# Patient Record
Sex: FEMALE | Race: BLACK OR AFRICAN AMERICAN | Employment: OTHER
[De-identification: names, ages, dates, MRNs, and addresses within clinical notes are randomized per-mention and may not be internally consistent; named-entity substitution may affect disease eponyms.]

---

## 2017-01-31 ENCOUNTER — OFFICE VISIT (OUTPATIENT)
Dept: ORTHOPEDIC SURGERY | Facility: CLINIC | Age: 47
End: 2017-01-31

## 2017-01-31 VITALS — BODY MASS INDEX: 47.09 KG/M2 | WEIGHT: 293 LBS | HEIGHT: 66 IN

## 2017-01-31 DIAGNOSIS — S46.011A TRAUMATIC ROTATOR CUFF TEAR, RIGHT, INITIAL ENCOUNTER: ICD-10-CM

## 2017-01-31 DIAGNOSIS — S43.084D: Primary | ICD-10-CM

## 2017-01-31 PROCEDURE — 99203 OFFICE O/P NEW LOW 30 MIN: CPT | Performed by: ORTHOPAEDIC SURGERY

## 2017-02-28 ENCOUNTER — OFFICE VISIT (OUTPATIENT)
Dept: ORTHOPEDIC SURGERY | Facility: CLINIC | Age: 47
End: 2017-02-28

## 2017-02-28 VITALS — BODY MASS INDEX: 47.09 KG/M2 | WEIGHT: 293 LBS | HEIGHT: 66 IN

## 2017-02-28 DIAGNOSIS — S43.084D: Primary | ICD-10-CM

## 2017-02-28 PROCEDURE — 99213 OFFICE O/P EST LOW 20 MIN: CPT | Performed by: ORTHOPAEDIC SURGERY

## 2017-03-07 ENCOUNTER — ANESTHESIA EVENT (OUTPATIENT)
Dept: OPERATING ROOM | Age: 47
End: 2017-03-07
Payer: MEDICAID

## 2017-03-08 ENCOUNTER — ANESTHESIA (OUTPATIENT)
Dept: OPERATING ROOM | Age: 47
End: 2017-03-08
Payer: MEDICAID

## 2017-03-08 ENCOUNTER — HOSPITAL ENCOUNTER (OUTPATIENT)
Age: 47
Setting detail: OUTPATIENT SURGERY
Discharge: HOME | End: 2017-03-08
Attending: ORTHOPAEDIC SURGERY | Admitting: ORTHOPAEDIC SURGERY
Payer: MEDICAID

## 2017-03-08 VITALS
OXYGEN SATURATION: 98 % | RESPIRATION RATE: 14 BRPM | BODY MASS INDEX: 48.82 KG/M2 | HEIGHT: 65 IN | TEMPERATURE: 97.3 F | WEIGHT: 293 LBS | DIASTOLIC BLOOD PRESSURE: 83 MMHG | HEART RATE: 79 BPM | SYSTOLIC BLOOD PRESSURE: 164 MMHG

## 2017-03-08 VITALS — OXYGEN SATURATION: 97 % | TEMPERATURE: 96 F | DIASTOLIC BLOOD PRESSURE: 149 MMHG | SYSTOLIC BLOOD PRESSURE: 233 MMHG

## 2017-03-08 LAB
GLUCOSE BLD-MCNC: 106 MG/DL (ref 74–106)
HCG, PREGNANCY URINE (POC): NEGATIVE
POC BUN: 32 MG/DL (ref 6–20)
POC CREATININE: 1.5 MG/DL (ref 0.6–1.4)
POC POTASSIUM: 5.1 MMOL/L (ref 3.5–5.1)

## 2017-03-08 PROCEDURE — 7100000000 HC PACU RECOVERY - FIRST 15 MIN: Performed by: ORTHOPAEDIC SURGERY

## 2017-03-08 PROCEDURE — 2580000003 HC RX 258: Performed by: ANESTHESIOLOGY

## 2017-03-08 PROCEDURE — 6360000002 HC RX W HCPCS: Performed by: NURSE ANESTHETIST, CERTIFIED REGISTERED

## 2017-03-08 PROCEDURE — 82947 ASSAY GLUCOSE BLOOD QUANT: CPT

## 2017-03-08 PROCEDURE — 84520 ASSAY OF UREA NITROGEN: CPT

## 2017-03-08 PROCEDURE — 76937 US GUIDE VASCULAR ACCESS: CPT

## 2017-03-08 PROCEDURE — 3700000001 HC ADD 15 MINUTES (ANESTHESIA): Performed by: ORTHOPAEDIC SURGERY

## 2017-03-08 PROCEDURE — 82565 ASSAY OF CREATININE: CPT

## 2017-03-08 PROCEDURE — 2720000010 HC SURG SUPPLY STERILE: Performed by: ORTHOPAEDIC SURGERY

## 2017-03-08 PROCEDURE — 6360000002 HC RX W HCPCS: Performed by: ANESTHESIOLOGY

## 2017-03-08 PROCEDURE — 3600000014 HC SURGERY LEVEL 4 ADDTL 15MIN: Performed by: ORTHOPAEDIC SURGERY

## 2017-03-08 PROCEDURE — 2500000003 HC RX 250 WO HCPCS: Performed by: NURSE ANESTHETIST, CERTIFIED REGISTERED

## 2017-03-08 PROCEDURE — 84132 ASSAY OF SERUM POTASSIUM: CPT

## 2017-03-08 PROCEDURE — 2500000003 HC RX 250 WO HCPCS: Performed by: STUDENT IN AN ORGANIZED HEALTH CARE EDUCATION/TRAINING PROGRAM

## 2017-03-08 PROCEDURE — 7100000001 HC PACU RECOVERY - ADDTL 15 MIN: Performed by: ORTHOPAEDIC SURGERY

## 2017-03-08 PROCEDURE — 6370000000 HC RX 637 (ALT 250 FOR IP): Performed by: ANESTHESIOLOGY

## 2017-03-08 PROCEDURE — 7100000010 HC PHASE II RECOVERY - FIRST 15 MIN: Performed by: ORTHOPAEDIC SURGERY

## 2017-03-08 PROCEDURE — 29823 SHO ARTHRS SRG XTNSV DBRDMT: CPT | Performed by: ORTHOPAEDIC SURGERY

## 2017-03-08 PROCEDURE — 3700000000 HC ANESTHESIA ATTENDED CARE: Performed by: ORTHOPAEDIC SURGERY

## 2017-03-08 PROCEDURE — 7100000011 HC PHASE II RECOVERY - ADDTL 15 MIN: Performed by: ORTHOPAEDIC SURGERY

## 2017-03-08 PROCEDURE — 3600000004 HC SURGERY LEVEL 4 BASE: Performed by: ORTHOPAEDIC SURGERY

## 2017-03-08 PROCEDURE — 84703 CHORIONIC GONADOTROPIN ASSAY: CPT

## 2017-03-08 RX ORDER — GLYCOPYRROLATE 0.2 MG/ML
INJECTION INTRAMUSCULAR; INTRAVENOUS PRN
Status: DISCONTINUED | OUTPATIENT
Start: 2017-03-08 | End: 2017-03-08 | Stop reason: SDUPTHER

## 2017-03-08 RX ORDER — THIAMINE MONONITRATE (VIT B1) 100 MG
100 TABLET ORAL DAILY
COMMUNITY

## 2017-03-08 RX ORDER — FENTANYL CITRATE 50 UG/ML
INJECTION, SOLUTION INTRAMUSCULAR; INTRAVENOUS PRN
Status: DISCONTINUED | OUTPATIENT
Start: 2017-03-08 | End: 2017-03-08 | Stop reason: SDUPTHER

## 2017-03-08 RX ORDER — OXYCODONE HYDROCHLORIDE AND ACETAMINOPHEN 5; 325 MG/1; MG/1
1 TABLET ORAL
Status: COMPLETED | OUTPATIENT
Start: 2017-03-08 | End: 2017-03-08

## 2017-03-08 RX ORDER — SODIUM CHLORIDE, SODIUM LACTATE, POTASSIUM CHLORIDE, CALCIUM CHLORIDE 600; 310; 30; 20 MG/100ML; MG/100ML; MG/100ML; MG/100ML
INJECTION, SOLUTION INTRAVENOUS CONTINUOUS
Status: DISCONTINUED | OUTPATIENT
Start: 2017-03-08 | End: 2017-03-08 | Stop reason: HOSPADM

## 2017-03-08 RX ORDER — ONDANSETRON 2 MG/ML
4 INJECTION INTRAMUSCULAR; INTRAVENOUS
Status: COMPLETED | OUTPATIENT
Start: 2017-03-08 | End: 2017-03-08

## 2017-03-08 RX ORDER — ONDANSETRON 2 MG/ML
INJECTION INTRAMUSCULAR; INTRAVENOUS PRN
Status: DISCONTINUED | OUTPATIENT
Start: 2017-03-08 | End: 2017-03-08 | Stop reason: SDUPTHER

## 2017-03-08 RX ORDER — OXYCODONE HYDROCHLORIDE AND ACETAMINOPHEN 5; 325 MG/1; MG/1
1 TABLET ORAL EVERY 4 HOURS PRN
Qty: 60 TABLET | Refills: 0 | Status: SHIPPED | OUTPATIENT
Start: 2017-03-08 | End: 2017-03-15

## 2017-03-08 RX ORDER — ROCURONIUM BROMIDE 10 MG/ML
INJECTION, SOLUTION INTRAVENOUS PRN
Status: DISCONTINUED | OUTPATIENT
Start: 2017-03-08 | End: 2017-03-08 | Stop reason: SDUPTHER

## 2017-03-08 RX ORDER — DIPHENHYDRAMINE HYDROCHLORIDE 50 MG/ML
12.5 INJECTION INTRAMUSCULAR; INTRAVENOUS
Status: DISCONTINUED | OUTPATIENT
Start: 2017-03-08 | End: 2017-03-08 | Stop reason: HOSPADM

## 2017-03-08 RX ORDER — PROMETHAZINE HYDROCHLORIDE 25 MG/ML
6.25 INJECTION, SOLUTION INTRAMUSCULAR; INTRAVENOUS
Status: COMPLETED | OUTPATIENT
Start: 2017-03-08 | End: 2017-03-08

## 2017-03-08 RX ORDER — FERROUS SULFATE 325(65) MG
325 TABLET ORAL
COMMUNITY

## 2017-03-08 RX ORDER — LIDOCAINE HYDROCHLORIDE 10 MG/ML
INJECTION, SOLUTION INFILTRATION; PERINEURAL PRN
Status: DISCONTINUED | OUTPATIENT
Start: 2017-03-08 | End: 2017-03-08 | Stop reason: SDUPTHER

## 2017-03-08 RX ORDER — PROMETHAZINE HYDROCHLORIDE 25 MG/ML
12.5 INJECTION, SOLUTION INTRAMUSCULAR; INTRAVENOUS
Status: DISCONTINUED | OUTPATIENT
Start: 2017-03-08 | End: 2017-03-08 | Stop reason: HOSPADM

## 2017-03-08 RX ORDER — PROPOFOL 10 MG/ML
INJECTION, EMULSION INTRAVENOUS PRN
Status: DISCONTINUED | OUTPATIENT
Start: 2017-03-08 | End: 2017-03-08 | Stop reason: SDUPTHER

## 2017-03-08 RX ADMIN — ROCURONIUM BROMIDE 20 MG: 10 INJECTION INTRAVENOUS at 10:40

## 2017-03-08 RX ADMIN — FENTANYL CITRATE 150 MCG: 50 INJECTION INTRAMUSCULAR; INTRAVENOUS at 10:19

## 2017-03-08 RX ADMIN — OXYCODONE HYDROCHLORIDE AND ACETAMINOPHEN 1 TABLET: 5; 325 TABLET ORAL at 13:03

## 2017-03-08 RX ADMIN — Medication 900 MG: at 10:35

## 2017-03-08 RX ADMIN — HYDROMORPHONE HYDROCHLORIDE 0.25 MG: 1 INJECTION, SOLUTION INTRAMUSCULAR; INTRAVENOUS; SUBCUTANEOUS at 13:36

## 2017-03-08 RX ADMIN — ROCURONIUM BROMIDE 50 MG: 10 INJECTION INTRAVENOUS at 10:19

## 2017-03-08 RX ADMIN — NEOSTIGMINE METHYLSULFATE 4 MG: 1 INJECTION, SOLUTION INTRAMUSCULAR; INTRAVENOUS; SUBCUTANEOUS at 11:31

## 2017-03-08 RX ADMIN — FENTANYL CITRATE 50 MCG: 50 INJECTION INTRAMUSCULAR; INTRAVENOUS at 10:46

## 2017-03-08 RX ADMIN — LIDOCAINE HYDROCHLORIDE 50 MG: 10 INJECTION, SOLUTION INFILTRATION; PERINEURAL at 10:19

## 2017-03-08 RX ADMIN — ONDANSETRON 4 MG: 2 INJECTION, SOLUTION INTRAMUSCULAR; INTRAVENOUS at 11:33

## 2017-03-08 RX ADMIN — FENTANYL CITRATE 50 MCG: 50 INJECTION INTRAMUSCULAR; INTRAVENOUS at 10:39

## 2017-03-08 RX ADMIN — PROMETHAZINE HYDROCHLORIDE 6.25 MG: 25 INJECTION, SOLUTION INTRAMUSCULAR; INTRAVENOUS at 13:49

## 2017-03-08 RX ADMIN — GLYCOPYRROLATE 0.6 MG: 0.2 INJECTION, SOLUTION INTRAMUSCULAR; INTRAVENOUS at 11:31

## 2017-03-08 RX ADMIN — ONDANSETRON 4 MG: 2 INJECTION, SOLUTION INTRAMUSCULAR; INTRAVENOUS at 12:10

## 2017-03-08 RX ADMIN — HYDROMORPHONE HYDROCHLORIDE 0.5 MG: 1 INJECTION, SOLUTION INTRAMUSCULAR; INTRAVENOUS; SUBCUTANEOUS at 12:39

## 2017-03-08 RX ADMIN — SODIUM CHLORIDE, POTASSIUM CHLORIDE, SODIUM LACTATE AND CALCIUM CHLORIDE: 600; 310; 30; 20 INJECTION, SOLUTION INTRAVENOUS at 10:02

## 2017-03-08 RX ADMIN — HYDROMORPHONE HYDROCHLORIDE 0.5 MG: 1 INJECTION, SOLUTION INTRAMUSCULAR; INTRAVENOUS; SUBCUTANEOUS at 12:05

## 2017-03-08 RX ADMIN — GLYCOPYRROLATE 0.2 MG: 0.2 INJECTION, SOLUTION INTRAMUSCULAR; INTRAVENOUS at 10:23

## 2017-03-08 RX ADMIN — PROPOFOL 200 MG: 10 INJECTION, EMULSION INTRAVENOUS at 10:19

## 2017-03-08 RX ADMIN — HYDROMORPHONE HYDROCHLORIDE 0.5 MG: 1 INJECTION, SOLUTION INTRAMUSCULAR; INTRAVENOUS; SUBCUTANEOUS at 12:19

## 2017-03-08 ASSESSMENT — PAIN SCALES - GENERAL
PAINLEVEL_OUTOF10: 9
PAINLEVEL_OUTOF10: 2
PAINLEVEL_OUTOF10: 9
PAINLEVEL_OUTOF10: 2
PAINLEVEL_OUTOF10: 7
PAINLEVEL_OUTOF10: 4
PAINLEVEL_OUTOF10: 8
PAINLEVEL_OUTOF10: 0
PAINLEVEL_OUTOF10: 8
PAINLEVEL_OUTOF10: 0
PAINLEVEL_OUTOF10: 5
PAINLEVEL_OUTOF10: 5
PAINLEVEL_OUTOF10: 2
PAINLEVEL_OUTOF10: 7

## 2017-03-08 ASSESSMENT — PAIN - FUNCTIONAL ASSESSMENT: PAIN_FUNCTIONAL_ASSESSMENT: 0-10

## 2017-03-15 ENCOUNTER — OFFICE VISIT (OUTPATIENT)
Dept: BARIATRICS/WEIGHT MGMT | Age: 47
End: 2017-03-15
Payer: MEDICAID

## 2017-03-15 VITALS
WEIGHT: 293 LBS | HEIGHT: 66 IN | HEART RATE: 68 BPM | RESPIRATION RATE: 18 BRPM | DIASTOLIC BLOOD PRESSURE: 74 MMHG | SYSTOLIC BLOOD PRESSURE: 128 MMHG | BODY MASS INDEX: 47.09 KG/M2

## 2017-03-15 DIAGNOSIS — J45.909 ASTHMA, CHRONIC, UNSPECIFIED ASTHMA SEVERITY, UNCOMPLICATED: Primary | ICD-10-CM

## 2017-03-15 DIAGNOSIS — Z98.84 S/P LAPAROSCOPIC SLEEVE GASTRECTOMY: ICD-10-CM

## 2017-03-15 DIAGNOSIS — I89.0 LYMPHEDEMA: ICD-10-CM

## 2017-03-15 DIAGNOSIS — D50.9 IRON DEFICIENCY ANEMIA, UNSPECIFIED IRON DEFICIENCY ANEMIA TYPE: ICD-10-CM

## 2017-03-15 DIAGNOSIS — E66.01 MORBID OBESITY WITH BMI OF 60.0-69.9, ADULT (HCC): ICD-10-CM

## 2017-03-15 PROCEDURE — 99213 OFFICE O/P EST LOW 20 MIN: CPT | Performed by: NURSE PRACTITIONER

## 2017-03-17 ENCOUNTER — HOSPITAL ENCOUNTER (OUTPATIENT)
Age: 47
Discharge: HOME | End: 2017-03-17
Payer: MEDICAID

## 2017-03-17 DIAGNOSIS — I89.0 LYMPHEDEMA: ICD-10-CM

## 2017-03-17 DIAGNOSIS — D50.9 IRON DEFICIENCY ANEMIA, UNSPECIFIED IRON DEFICIENCY ANEMIA TYPE: ICD-10-CM

## 2017-03-17 DIAGNOSIS — Z98.84 S/P LAPAROSCOPIC SLEEVE GASTRECTOMY: ICD-10-CM

## 2017-03-17 DIAGNOSIS — J45.909 ASTHMA, CHRONIC, UNSPECIFIED ASTHMA SEVERITY, UNCOMPLICATED: ICD-10-CM

## 2017-03-17 DIAGNOSIS — E66.01 MORBID OBESITY WITH BMI OF 60.0-69.9, ADULT (HCC): ICD-10-CM

## 2017-03-17 PROBLEM — R73.03 PREDIABETES: Status: ACTIVE | Noted: 2017-03-17

## 2017-03-17 LAB
ABSOLUTE EOS #: 0.24 K/UL (ref 0–0.4)
ABSOLUTE LYMPH #: 1.5 K/UL (ref 1–4.8)
ABSOLUTE MONO #: 0.36 K/UL (ref 0–1)
ALBUMIN SERPL-MCNC: 3.9 G/DL (ref 3.5–5.2)
ALBUMIN/GLOBULIN RATIO: 1 (ref 1–2.5)
ALP BLD-CCNC: 84 U/L (ref 35–104)
ALT SERPL-CCNC: 6 U/L (ref 5–33)
ANION GAP SERPL CALCULATED.3IONS-SCNC: 12 MMOL/L (ref 9–17)
AST SERPL-CCNC: 9 U/L
BASOPHILS # BLD: 0 % (ref 0–2)
BASOPHILS ABSOLUTE: 0 K/UL (ref 0–0.2)
BILIRUB SERPL-MCNC: 0.48 MG/DL (ref 0.3–1.2)
BUN BLDV-MCNC: 16 MG/DL (ref 6–20)
BUN/CREAT BLD: 12 (ref 9–20)
CALCIUM SERPL-MCNC: 9.5 MG/DL (ref 8.6–10.4)
CHLORIDE BLD-SCNC: 97 MMOL/L (ref 98–107)
CHOLESTEROL/HDL RATIO: 4.6
CHOLESTEROL: 278 MG/DL
CO2: 28 MMOL/L (ref 20–31)
CREAT SERPL-MCNC: 1.3 MG/DL (ref 0.5–0.9)
DIFFERENTIAL TYPE: ABNORMAL
EOSINOPHILS RELATIVE PERCENT: 4 % (ref 0–8)
ESTIMATED AVERAGE GLUCOSE: 126 MG/DL
FERRITIN: 108 UG/L (ref 13–150)
FOLATE: 7.9 NG/ML
GFR AFRICAN AMERICAN: 54 ML/MIN
GFR NON-AFRICAN AMERICAN: 44 ML/MIN
GFR SERPL CREATININE-BSD FRML MDRD: ABNORMAL ML/MIN/{1.73_M2}
GFR SERPL CREATININE-BSD FRML MDRD: ABNORMAL ML/MIN/{1.73_M2}
GLUCOSE BLD-MCNC: 98 MG/DL (ref 70–99)
HBA1C MFR BLD: 6 % (ref 4.8–5.9)
HCT VFR BLD CALC: 34.9 % (ref 36–46)
HDLC SERPL-MCNC: 61 MG/DL
HEMOGLOBIN: 11.1 G/DL (ref 12–16)
IRON SATURATION: 15 % (ref 20–55)
IRON: 38 UG/DL (ref 37–145)
LDL CHOLESTEROL: 198 MG/DL (ref 0–130)
LYMPHOCYTES # BLD: 25 % (ref 24–44)
MAGNESIUM: 2 MG/DL (ref 1.6–2.6)
MCH RBC QN AUTO: 26.9 PG (ref 26–34)
MCHC RBC AUTO-ENTMCNC: 31.7 G/DL (ref 31–37)
MCV RBC AUTO: 85 FL (ref 80–100)
MONOCYTES # BLD: 6 % (ref 0–12)
MORPHOLOGY: ABNORMAL
PDW BLD-RTO: 17.1 % (ref 12.1–15.2)
PLATELET # BLD: 343 K/UL (ref 140–450)
PLATELET ESTIMATE: ABNORMAL
PMV BLD AUTO: 9.3 FL (ref 6–12)
POTASSIUM SERPL-SCNC: 4.1 MMOL/L (ref 3.7–5.3)
PTH INTACT: 68.29 PG/ML (ref 15–65)
RBC # BLD: 4.1 M/UL (ref 4–5.2)
RBC # BLD: ABNORMAL 10*6/UL
SEG NEUTROPHILS: 65 % (ref 36–66)
SEGMENTED NEUTROPHILS ABSOLUTE COUNT: 3.9 K/UL (ref 1.8–7.7)
SODIUM BLD-SCNC: 137 MMOL/L (ref 135–144)
TOTAL IRON BINDING CAPACITY: 253 UG/DL (ref 250–450)
TOTAL PROTEIN: 7.8 G/DL (ref 6.4–8.3)
TRIGL SERPL-MCNC: 95 MG/DL
TSH SERPL DL<=0.05 MIU/L-ACNC: 1.81 MIU/L (ref 0.3–5)
UNSATURATED IRON BINDING CAPACITY: 215.3 UG/DL (ref 112–347)
VITAMIN B-12: 766 PG/ML (ref 211–946)
VITAMIN D 25-HYDROXY: 33.5 NG/ML (ref 30–100)
VLDLC SERPL CALC-MCNC: ABNORMAL MG/DL (ref 1–30)
WBC # BLD: 6 K/UL (ref 3.5–11)
WBC # BLD: ABNORMAL 10*3/UL

## 2017-03-17 PROCEDURE — 83550 IRON BINDING TEST: CPT

## 2017-03-17 PROCEDURE — 82607 VITAMIN B-12: CPT

## 2017-03-17 PROCEDURE — 80061 LIPID PANEL: CPT

## 2017-03-17 PROCEDURE — 84630 ASSAY OF ZINC: CPT

## 2017-03-17 PROCEDURE — 82728 ASSAY OF FERRITIN: CPT

## 2017-03-17 PROCEDURE — 83735 ASSAY OF MAGNESIUM: CPT

## 2017-03-17 PROCEDURE — 84590 ASSAY OF VITAMIN A: CPT

## 2017-03-17 PROCEDURE — 36415 COLL VENOUS BLD VENIPUNCTURE: CPT

## 2017-03-17 PROCEDURE — 83540 ASSAY OF IRON: CPT

## 2017-03-17 PROCEDURE — 83970 ASSAY OF PARATHORMONE: CPT

## 2017-03-17 PROCEDURE — 82746 ASSAY OF FOLIC ACID SERUM: CPT

## 2017-03-17 PROCEDURE — 80053 COMPREHEN METABOLIC PANEL: CPT

## 2017-03-17 PROCEDURE — 84425 ASSAY OF VITAMIN B-1: CPT

## 2017-03-17 PROCEDURE — 85025 COMPLETE CBC W/AUTO DIFF WBC: CPT

## 2017-03-17 PROCEDURE — 84443 ASSAY THYROID STIM HORMONE: CPT

## 2017-03-17 PROCEDURE — 83036 HEMOGLOBIN GLYCOSYLATED A1C: CPT

## 2017-03-17 PROCEDURE — 82306 VITAMIN D 25 HYDROXY: CPT

## 2017-03-20 LAB
RETINYL PALMITATE: <0.02 MG/L (ref 0–0.1)
VITAMIN A LEVEL: 0.44 MG/L (ref 0.3–1.2)
VITAMIN A, INTERP: NORMAL
VITAMIN B1 WHOLE BLOOD: 58 NMOL/L (ref 70–180)
ZINC: 67 UG/DL (ref 60–120)

## 2017-03-23 ENCOUNTER — OFFICE VISIT (OUTPATIENT)
Dept: ORTHOPEDIC SURGERY | Age: 47
End: 2017-03-23

## 2017-03-23 VITALS — WEIGHT: 293 LBS | BODY MASS INDEX: 47.09 KG/M2 | HEIGHT: 66 IN

## 2017-03-23 DIAGNOSIS — S42.91XD SHOULDER FRACTURE, RIGHT, WITH ROUTINE HEALING, SUBSEQUENT ENCOUNTER: Primary | ICD-10-CM

## 2017-03-23 PROCEDURE — 99024 POSTOP FOLLOW-UP VISIT: CPT | Performed by: ORTHOPAEDIC SURGERY

## 2017-03-23 RX ORDER — OXYCODONE HYDROCHLORIDE AND ACETAMINOPHEN 5; 325 MG/1; MG/1
1 TABLET ORAL EVERY 4 HOURS PRN
COMMUNITY
End: 2017-04-25 | Stop reason: ALTCHOICE

## 2017-03-28 ENCOUNTER — HOSPITAL ENCOUNTER (OUTPATIENT)
Dept: PHYSICAL THERAPY | Age: 47
Setting detail: THERAPIES SERIES
Discharge: HOME | End: 2017-03-28
Payer: MEDICAID

## 2017-03-28 PROCEDURE — 97110 THERAPEUTIC EXERCISES: CPT

## 2017-03-28 PROCEDURE — 97164 PT RE-EVAL EST PLAN CARE: CPT

## 2017-03-28 ASSESSMENT — PAIN SCALES - GENERAL: PAINLEVEL_OUTOF10: 6

## 2017-03-28 ASSESSMENT — PAIN DESCRIPTION - ORIENTATION: ORIENTATION: RIGHT

## 2017-03-28 ASSESSMENT — PAIN DESCRIPTION - LOCATION: LOCATION: SHOULDER

## 2017-03-30 ENCOUNTER — HOSPITAL ENCOUNTER (OUTPATIENT)
Dept: PHYSICAL THERAPY | Age: 47
Setting detail: THERAPIES SERIES
Discharge: HOME | End: 2017-03-30
Payer: MEDICAID

## 2017-03-30 PROCEDURE — G0283 ELEC STIM OTHER THAN WOUND: HCPCS

## 2017-03-30 PROCEDURE — 97140 MANUAL THERAPY 1/> REGIONS: CPT

## 2017-03-30 PROCEDURE — 97110 THERAPEUTIC EXERCISES: CPT

## 2017-03-30 ASSESSMENT — PAIN DESCRIPTION - LOCATION: LOCATION: SHOULDER

## 2017-03-30 ASSESSMENT — PAIN DESCRIPTION - ORIENTATION: ORIENTATION: RIGHT

## 2017-03-30 ASSESSMENT — PAIN SCALES - GENERAL: PAINLEVEL_OUTOF10: 4

## 2017-04-03 ENCOUNTER — HOSPITAL ENCOUNTER (OUTPATIENT)
Dept: PHYSICAL THERAPY | Age: 47
Setting detail: THERAPIES SERIES
Discharge: HOME | End: 2017-04-03
Payer: MEDICAID

## 2017-04-03 PROCEDURE — 97110 THERAPEUTIC EXERCISES: CPT

## 2017-04-03 PROCEDURE — G0283 ELEC STIM OTHER THAN WOUND: HCPCS

## 2017-04-05 ENCOUNTER — HOSPITAL ENCOUNTER (OUTPATIENT)
Dept: PHYSICAL THERAPY | Age: 47
Setting detail: THERAPIES SERIES
Discharge: HOME | End: 2017-04-05
Payer: MEDICAID

## 2017-04-05 PROCEDURE — 97110 THERAPEUTIC EXERCISES: CPT

## 2017-04-05 PROCEDURE — 97140 MANUAL THERAPY 1/> REGIONS: CPT

## 2017-04-05 PROCEDURE — G0283 ELEC STIM OTHER THAN WOUND: HCPCS

## 2017-04-05 ASSESSMENT — PAIN DESCRIPTION - LOCATION: LOCATION: SHOULDER

## 2017-04-05 ASSESSMENT — PAIN SCALES - GENERAL: PAINLEVEL_OUTOF10: 8

## 2017-04-05 ASSESSMENT — PAIN DESCRIPTION - ORIENTATION: ORIENTATION: RIGHT

## 2017-04-07 ENCOUNTER — HOSPITAL ENCOUNTER (OUTPATIENT)
Dept: PHYSICAL THERAPY | Age: 47
Setting detail: THERAPIES SERIES
Discharge: HOME | End: 2017-04-07
Payer: MEDICAID

## 2017-04-07 NOTE — PROGRESS NOTES
[facility]  Inpatient/Observation/Outpatient Rehabilitation    Date: 2017  Patient Name: Cate Hawthorne       [] Inpatient Acute/Observation       []  Outpatient  : 1970       [] Pt no showed for scheduled appointment    [] Pt refused/declined therapy at this time due to:           [x] Pt cancelled due to:  [] No Reason Given   [] Sick/ill   [] OtherTransportation  Will attempt evaluation at our earliest availability.        Loral Spring Date: 2017

## 2017-04-10 ENCOUNTER — HOSPITAL ENCOUNTER (OUTPATIENT)
Dept: PHYSICAL THERAPY | Age: 47
Setting detail: THERAPIES SERIES
Discharge: HOME | End: 2017-04-10
Payer: MEDICAID

## 2017-04-10 PROCEDURE — G0283 ELEC STIM OTHER THAN WOUND: HCPCS

## 2017-04-10 PROCEDURE — 97110 THERAPEUTIC EXERCISES: CPT

## 2017-04-12 ENCOUNTER — HOSPITAL ENCOUNTER (OUTPATIENT)
Dept: PHYSICAL THERAPY | Age: 47
Setting detail: THERAPIES SERIES
Discharge: HOME | End: 2017-04-12
Payer: MEDICAID

## 2017-04-12 PROCEDURE — 97140 MANUAL THERAPY 1/> REGIONS: CPT

## 2017-04-12 PROCEDURE — 97110 THERAPEUTIC EXERCISES: CPT

## 2017-04-12 PROCEDURE — G0283 ELEC STIM OTHER THAN WOUND: HCPCS

## 2017-04-14 ENCOUNTER — HOSPITAL ENCOUNTER (OUTPATIENT)
Dept: PHYSICAL THERAPY | Age: 47
Setting detail: THERAPIES SERIES
Discharge: HOME | End: 2017-04-14
Payer: MEDICAID

## 2017-04-14 PROCEDURE — 97110 THERAPEUTIC EXERCISES: CPT

## 2017-04-14 PROCEDURE — G0283 ELEC STIM OTHER THAN WOUND: HCPCS

## 2017-04-14 ASSESSMENT — PAIN SCALES - GENERAL: PAINLEVEL_OUTOF10: 5

## 2017-04-17 ENCOUNTER — HOSPITAL ENCOUNTER (OUTPATIENT)
Dept: PHYSICAL THERAPY | Age: 47
Setting detail: THERAPIES SERIES
Discharge: HOME | End: 2017-04-17
Payer: MEDICAID

## 2017-04-17 PROCEDURE — 97110 THERAPEUTIC EXERCISES: CPT

## 2017-04-17 PROCEDURE — G0283 ELEC STIM OTHER THAN WOUND: HCPCS

## 2017-04-17 ASSESSMENT — PAIN SCALES - GENERAL: PAINLEVEL_OUTOF10: 5

## 2017-04-18 ENCOUNTER — HOSPITAL ENCOUNTER (OUTPATIENT)
Dept: PHYSICAL THERAPY | Age: 47
Setting detail: THERAPIES SERIES
Discharge: HOME | End: 2017-04-18
Payer: MEDICAID

## 2017-04-18 PROCEDURE — G0283 ELEC STIM OTHER THAN WOUND: HCPCS

## 2017-04-18 PROCEDURE — 97110 THERAPEUTIC EXERCISES: CPT

## 2017-04-21 ENCOUNTER — HOSPITAL ENCOUNTER (OUTPATIENT)
Dept: PHYSICAL THERAPY | Age: 47
Setting detail: THERAPIES SERIES
Discharge: HOME | End: 2017-04-21
Payer: MEDICAID

## 2017-04-21 PROCEDURE — 97110 THERAPEUTIC EXERCISES: CPT

## 2017-04-21 PROCEDURE — G0283 ELEC STIM OTHER THAN WOUND: HCPCS

## 2017-04-21 ASSESSMENT — PAIN SCALES - GENERAL: PAINLEVEL_OUTOF10: 6

## 2017-04-24 ENCOUNTER — APPOINTMENT (OUTPATIENT)
Dept: PHYSICAL THERAPY | Age: 47
End: 2017-04-24
Payer: MEDICAID

## 2017-04-25 PROBLEM — G44.309 HEADACHES DUE TO OLD HEAD INJURY: Status: ACTIVE | Noted: 2017-04-25

## 2017-04-25 PROBLEM — S09.90XS HEADACHES DUE TO OLD HEAD INJURY: Status: ACTIVE | Noted: 2017-04-25

## 2017-04-25 PROBLEM — H91.90 HEARING LOSS: Status: ACTIVE | Noted: 2017-04-25

## 2017-04-26 ENCOUNTER — HOSPITAL ENCOUNTER (OUTPATIENT)
Dept: PHYSICAL THERAPY | Age: 47
Setting detail: THERAPIES SERIES
Discharge: HOME | End: 2017-04-26
Payer: MEDICAID

## 2017-04-26 PROCEDURE — 97110 THERAPEUTIC EXERCISES: CPT

## 2017-04-26 PROCEDURE — G0283 ELEC STIM OTHER THAN WOUND: HCPCS

## 2017-04-26 RX ORDER — PANTOPRAZOLE SODIUM 40 MG/1
TABLET, DELAYED RELEASE ORAL
Qty: 30 TABLET | Refills: 3 | Status: SHIPPED | OUTPATIENT
Start: 2017-04-26 | End: 2017-09-23 | Stop reason: SDUPTHER

## 2017-04-26 ASSESSMENT — PAIN DESCRIPTION - ORIENTATION: ORIENTATION: RIGHT

## 2017-04-26 ASSESSMENT — PAIN DESCRIPTION - LOCATION: LOCATION: SHOULDER

## 2017-04-26 ASSESSMENT — PAIN SCALES - GENERAL: PAINLEVEL_OUTOF10: 7

## 2017-04-27 ENCOUNTER — HOSPITAL ENCOUNTER (OUTPATIENT)
Dept: PHYSICAL THERAPY | Age: 47
Setting detail: THERAPIES SERIES
Discharge: HOME | End: 2017-04-27
Payer: MEDICAID

## 2017-04-27 PROCEDURE — 97110 THERAPEUTIC EXERCISES: CPT

## 2017-04-27 PROCEDURE — G0283 ELEC STIM OTHER THAN WOUND: HCPCS

## 2017-04-27 ASSESSMENT — PAIN DESCRIPTION - ORIENTATION: ORIENTATION: RIGHT

## 2017-04-27 ASSESSMENT — PAIN DESCRIPTION - LOCATION: LOCATION: SHOULDER

## 2017-04-27 ASSESSMENT — PAIN SCALES - GENERAL: PAINLEVEL_OUTOF10: 5

## 2017-04-28 ENCOUNTER — HOSPITAL ENCOUNTER (OUTPATIENT)
Dept: PHYSICAL THERAPY | Age: 47
Setting detail: THERAPIES SERIES
Discharge: HOME | End: 2017-04-28
Payer: MEDICAID

## 2017-04-28 PROCEDURE — 97110 THERAPEUTIC EXERCISES: CPT

## 2017-04-28 PROCEDURE — G0283 ELEC STIM OTHER THAN WOUND: HCPCS

## 2017-04-28 ASSESSMENT — PAIN DESCRIPTION - ORIENTATION: ORIENTATION: RIGHT

## 2017-04-28 ASSESSMENT — PAIN DESCRIPTION - LOCATION: LOCATION: SHOULDER

## 2017-04-28 ASSESSMENT — PAIN SCALES - GENERAL: PAINLEVEL_OUTOF10: 4

## 2017-05-01 ENCOUNTER — HOSPITAL ENCOUNTER (OUTPATIENT)
Dept: PHYSICAL THERAPY | Age: 47
Setting detail: THERAPIES SERIES
Discharge: HOME | End: 2017-05-01
Payer: MEDICAID

## 2017-05-01 PROCEDURE — G0283 ELEC STIM OTHER THAN WOUND: HCPCS

## 2017-05-01 PROCEDURE — 97110 THERAPEUTIC EXERCISES: CPT

## 2017-05-01 PROCEDURE — 97116 GAIT TRAINING THERAPY: CPT

## 2017-05-03 ENCOUNTER — HOSPITAL ENCOUNTER (OUTPATIENT)
Dept: PHYSICAL THERAPY | Age: 47
Setting detail: THERAPIES SERIES
Discharge: HOME | End: 2017-05-03
Payer: MEDICAID

## 2017-05-03 ENCOUNTER — HOSPITAL ENCOUNTER (EMERGENCY)
Age: 47
Discharge: HOME | End: 2017-05-03
Attending: FAMILY MEDICINE
Payer: MEDICAID

## 2017-05-03 ENCOUNTER — APPOINTMENT (OUTPATIENT)
Dept: GENERAL RADIOLOGY | Age: 47
End: 2017-05-03
Payer: MEDICAID

## 2017-05-03 VITALS
SYSTOLIC BLOOD PRESSURE: 179 MMHG | TEMPERATURE: 97.8 F | WEIGHT: 293 LBS | OXYGEN SATURATION: 96 % | BODY MASS INDEX: 45.99 KG/M2 | DIASTOLIC BLOOD PRESSURE: 89 MMHG | HEART RATE: 78 BPM | HEIGHT: 67 IN

## 2017-05-03 DIAGNOSIS — J20.9 ACUTE BRONCHITIS, UNSPECIFIED ORGANISM: Primary | ICD-10-CM

## 2017-05-03 PROCEDURE — 71020 XR CHEST STANDARD TWO VW: CPT

## 2017-05-03 PROCEDURE — 97110 THERAPEUTIC EXERCISES: CPT

## 2017-05-03 PROCEDURE — 94664 DEMO&/EVAL PT USE INHALER: CPT

## 2017-05-03 PROCEDURE — G0283 ELEC STIM OTHER THAN WOUND: HCPCS

## 2017-05-03 PROCEDURE — 6370000000 HC RX 637 (ALT 250 FOR IP)

## 2017-05-03 PROCEDURE — 6360000002 HC RX W HCPCS: Performed by: FAMILY MEDICINE

## 2017-05-03 PROCEDURE — 93005 ELECTROCARDIOGRAM TRACING: CPT

## 2017-05-03 PROCEDURE — 99285 EMERGENCY DEPT VISIT HI MDM: CPT

## 2017-05-03 PROCEDURE — 97140 MANUAL THERAPY 1/> REGIONS: CPT

## 2017-05-03 RX ORDER — NABUMETONE 500 MG/1
500 TABLET, FILM COATED ORAL 2 TIMES DAILY
COMMUNITY

## 2017-05-03 RX ORDER — BENZONATATE 100 MG/1
100 CAPSULE ORAL EVERY 4 HOURS PRN
Qty: 30 CAPSULE | Refills: 0 | Status: SHIPPED | OUTPATIENT
Start: 2017-05-03 | End: 2017-05-10

## 2017-05-03 RX ORDER — ALBUTEROL SULFATE 2.5 MG/3ML
2.5 SOLUTION RESPIRATORY (INHALATION) ONCE
Status: COMPLETED | OUTPATIENT
Start: 2017-05-03 | End: 2017-05-03

## 2017-05-03 RX ORDER — IPRATROPIUM BROMIDE AND ALBUTEROL SULFATE 2.5; .5 MG/3ML; MG/3ML
SOLUTION RESPIRATORY (INHALATION)
Status: DISCONTINUED
Start: 2017-05-03 | End: 2017-05-03 | Stop reason: HOSPADM

## 2017-05-03 RX ORDER — ALBUTEROL SULFATE 2.5 MG/3ML
2.5 SOLUTION RESPIRATORY (INHALATION) EVERY 6 HOURS PRN
Status: DISCONTINUED | OUTPATIENT
Start: 2017-05-03 | End: 2017-05-03

## 2017-05-03 RX ADMIN — ALBUTEROL SULFATE 2.5 MG: 2.5 SOLUTION RESPIRATORY (INHALATION) at 12:17

## 2017-05-03 ASSESSMENT — PAIN DESCRIPTION - PAIN TYPE: TYPE: ACUTE PAIN;CHRONIC PAIN

## 2017-05-03 ASSESSMENT — PAIN DESCRIPTION - LOCATION: LOCATION: SHOULDER

## 2017-05-03 ASSESSMENT — PAIN DESCRIPTION - DESCRIPTORS: DESCRIPTORS: ACHING

## 2017-05-04 ENCOUNTER — HOSPITAL ENCOUNTER (OUTPATIENT)
Dept: MRI IMAGING | Facility: CLINIC | Age: 47
Discharge: HOME | End: 2017-05-04
Payer: MEDICAID

## 2017-05-04 ENCOUNTER — OFFICE VISIT (OUTPATIENT)
Dept: ORTHOPEDIC SURGERY | Age: 47
End: 2017-05-04

## 2017-05-04 VITALS — HEIGHT: 66 IN | WEIGHT: 293 LBS | BODY MASS INDEX: 47.09 KG/M2

## 2017-05-04 DIAGNOSIS — E51.9 THIAMINE DEFICIENCY: ICD-10-CM

## 2017-05-04 DIAGNOSIS — G44.309 HEADACHES DUE TO OLD HEAD INJURY: ICD-10-CM

## 2017-05-04 DIAGNOSIS — S09.90XS HEADACHES DUE TO OLD HEAD INJURY: ICD-10-CM

## 2017-05-04 DIAGNOSIS — S43.084A: Primary | ICD-10-CM

## 2017-05-04 DIAGNOSIS — H91.91 HEARING LOSS, RIGHT: ICD-10-CM

## 2017-05-04 DIAGNOSIS — S43.003A SUBLUXATION OF TENDON OF LONG HEAD OF BICEPS: ICD-10-CM

## 2017-05-04 PROCEDURE — 70551 MRI BRAIN STEM W/O DYE: CPT

## 2017-05-04 PROCEDURE — 99024 POSTOP FOLLOW-UP VISIT: CPT | Performed by: ORTHOPAEDIC SURGERY

## 2017-05-04 PROCEDURE — 72141 MRI NECK SPINE W/O DYE: CPT

## 2017-05-05 ENCOUNTER — HOSPITAL ENCOUNTER (OUTPATIENT)
Dept: PHYSICAL THERAPY | Age: 47
Setting detail: THERAPIES SERIES
Discharge: HOME | End: 2017-05-05
Payer: MEDICAID

## 2017-05-05 LAB
EKG ATRIAL RATE: 77 BPM
EKG P AXIS: 12 DEGREES
EKG P-R INTERVAL: 146 MS
EKG Q-T INTERVAL: 406 MS
EKG QRS DURATION: 84 MS
EKG QTC CALCULATION (BAZETT): 459 MS
EKG R AXIS: 15 DEGREES
EKG T AXIS: 35 DEGREES
EKG VENTRICULAR RATE: 77 BPM

## 2017-05-05 PROCEDURE — G0283 ELEC STIM OTHER THAN WOUND: HCPCS

## 2017-05-05 PROCEDURE — 97140 MANUAL THERAPY 1/> REGIONS: CPT

## 2017-05-05 PROCEDURE — 97110 THERAPEUTIC EXERCISES: CPT

## 2017-05-08 ENCOUNTER — HOSPITAL ENCOUNTER (OUTPATIENT)
Dept: PHYSICAL THERAPY | Age: 47
Setting detail: THERAPIES SERIES
Discharge: HOME | End: 2017-05-08
Payer: MEDICAID

## 2017-05-08 PROCEDURE — 97110 THERAPEUTIC EXERCISES: CPT

## 2017-05-08 PROCEDURE — G0283 ELEC STIM OTHER THAN WOUND: HCPCS

## 2017-05-08 PROCEDURE — 97140 MANUAL THERAPY 1/> REGIONS: CPT

## 2017-05-08 ASSESSMENT — PAIN DESCRIPTION - LOCATION: LOCATION: SHOULDER

## 2017-05-08 ASSESSMENT — PAIN DESCRIPTION - ORIENTATION: ORIENTATION: RIGHT

## 2017-05-08 ASSESSMENT — PAIN SCALES - GENERAL: PAINLEVEL_OUTOF10: 8

## 2017-05-09 ENCOUNTER — HOSPITAL ENCOUNTER (OUTPATIENT)
Dept: MRI IMAGING | Facility: CLINIC | Age: 47
Discharge: HOME | End: 2017-05-09
Payer: MEDICAID

## 2017-05-09 DIAGNOSIS — M54.16 LUMBAR RADICULOPATHY: ICD-10-CM

## 2017-05-09 PROCEDURE — 72148 MRI LUMBAR SPINE W/O DYE: CPT

## 2017-05-10 ENCOUNTER — HOSPITAL ENCOUNTER (OUTPATIENT)
Dept: PHYSICAL THERAPY | Age: 47
Setting detail: THERAPIES SERIES
Discharge: HOME | End: 2017-05-10
Payer: MEDICAID

## 2017-05-10 PROCEDURE — 97110 THERAPEUTIC EXERCISES: CPT

## 2017-05-10 ASSESSMENT — PAIN SCALES - GENERAL: PAINLEVEL_OUTOF10: 7

## 2017-05-12 ENCOUNTER — HOSPITAL ENCOUNTER (OUTPATIENT)
Dept: PHYSICAL THERAPY | Age: 47
Setting detail: THERAPIES SERIES
Discharge: HOME | End: 2017-05-12
Payer: MEDICAID

## 2017-05-12 PROCEDURE — 97110 THERAPEUTIC EXERCISES: CPT

## 2017-05-15 ENCOUNTER — HOSPITAL ENCOUNTER (OUTPATIENT)
Dept: PHYSICAL THERAPY | Age: 47
Setting detail: THERAPIES SERIES
Discharge: HOME | End: 2017-05-15
Payer: MEDICAID

## 2017-05-15 PROCEDURE — G0283 ELEC STIM OTHER THAN WOUND: HCPCS

## 2017-05-15 PROCEDURE — 97110 THERAPEUTIC EXERCISES: CPT

## 2017-05-15 ASSESSMENT — PAIN SCALES - GENERAL: PAINLEVEL_OUTOF10: 6

## 2017-05-15 ASSESSMENT — PAIN DESCRIPTION - LOCATION: LOCATION: SHOULDER

## 2017-05-15 ASSESSMENT — PAIN DESCRIPTION - ORIENTATION: ORIENTATION: RIGHT

## 2017-05-17 ENCOUNTER — HOSPITAL ENCOUNTER (OUTPATIENT)
Dept: PHYSICAL THERAPY | Age: 47
Setting detail: THERAPIES SERIES
Discharge: HOME | End: 2017-05-17
Payer: MEDICAID

## 2017-05-17 PROCEDURE — 97110 THERAPEUTIC EXERCISES: CPT

## 2017-05-19 ENCOUNTER — HOSPITAL ENCOUNTER (OUTPATIENT)
Dept: PHYSICAL THERAPY | Age: 47
Setting detail: THERAPIES SERIES
Discharge: HOME | End: 2017-05-19
Payer: MEDICAID

## 2017-05-19 PROCEDURE — 97116 GAIT TRAINING THERAPY: CPT

## 2017-05-19 PROCEDURE — 97110 THERAPEUTIC EXERCISES: CPT

## 2017-05-22 ENCOUNTER — HOSPITAL ENCOUNTER (OUTPATIENT)
Dept: NEUROLOGY | Age: 47
Discharge: HOME | End: 2017-05-22
Payer: MEDICAID

## 2017-05-22 ENCOUNTER — HOSPITAL ENCOUNTER (OUTPATIENT)
Dept: PHYSICAL THERAPY | Age: 47
Setting detail: THERAPIES SERIES
Discharge: HOME | End: 2017-05-22
Payer: MEDICAID

## 2017-05-22 DIAGNOSIS — G44.309 HEADACHES DUE TO OLD HEAD INJURY: ICD-10-CM

## 2017-05-22 DIAGNOSIS — S09.90XS HEADACHES DUE TO OLD HEAD INJURY: ICD-10-CM

## 2017-05-22 DIAGNOSIS — H91.91 HEARING LOSS, RIGHT: ICD-10-CM

## 2017-05-22 DIAGNOSIS — M25.569 ARTHRALGIA OF LOWER LEG, UNSPECIFIED LATERALITY: ICD-10-CM

## 2017-05-22 DIAGNOSIS — E51.9 THIAMINE DEFICIENCY: ICD-10-CM

## 2017-05-22 PROCEDURE — 95819 EEG AWAKE AND ASLEEP: CPT

## 2017-05-22 PROCEDURE — 97140 MANUAL THERAPY 1/> REGIONS: CPT

## 2017-05-22 PROCEDURE — 97110 THERAPEUTIC EXERCISES: CPT

## 2017-05-24 ENCOUNTER — HOSPITAL ENCOUNTER (OUTPATIENT)
Dept: ULTRASOUND IMAGING | Age: 47
Discharge: HOME | End: 2017-05-24
Payer: MEDICAID

## 2017-05-24 ENCOUNTER — HOSPITAL ENCOUNTER (OUTPATIENT)
Dept: PHYSICAL THERAPY | Age: 47
Setting detail: THERAPIES SERIES
Discharge: HOME | End: 2017-05-24
Payer: MEDICAID

## 2017-05-24 DIAGNOSIS — E01.0 THYROMEGALY: ICD-10-CM

## 2017-05-24 DIAGNOSIS — R53.83 FATIGUE, UNSPECIFIED TYPE: ICD-10-CM

## 2017-05-24 PROCEDURE — 97116 GAIT TRAINING THERAPY: CPT

## 2017-05-24 PROCEDURE — 76536 US EXAM OF HEAD AND NECK: CPT

## 2017-05-24 PROCEDURE — 97110 THERAPEUTIC EXERCISES: CPT

## 2017-05-26 ENCOUNTER — HOSPITAL ENCOUNTER (OUTPATIENT)
Dept: PHYSICAL THERAPY | Age: 47
Setting detail: THERAPIES SERIES
Discharge: HOME | End: 2017-05-26
Payer: MEDICAID

## 2017-05-26 PROCEDURE — 97110 THERAPEUTIC EXERCISES: CPT

## 2017-05-30 ENCOUNTER — HOSPITAL ENCOUNTER (OUTPATIENT)
Dept: PHYSICAL THERAPY | Age: 47
Setting detail: THERAPIES SERIES
Discharge: HOME | End: 2017-05-30
Payer: MEDICAID

## 2017-05-30 PROCEDURE — 97110 THERAPEUTIC EXERCISES: CPT

## 2017-05-31 ENCOUNTER — HOSPITAL ENCOUNTER (OUTPATIENT)
Dept: PHYSICAL THERAPY | Age: 47
Setting detail: THERAPIES SERIES
Discharge: HOME | End: 2017-05-31
Payer: MEDICAID

## 2017-05-31 PROCEDURE — 97110 THERAPEUTIC EXERCISES: CPT

## 2017-06-02 ENCOUNTER — HOSPITAL ENCOUNTER (OUTPATIENT)
Dept: PHYSICAL THERAPY | Age: 47
Setting detail: THERAPIES SERIES
End: 2017-06-02
Payer: MEDICAID

## 2017-06-05 ENCOUNTER — APPOINTMENT (OUTPATIENT)
Dept: PHYSICAL THERAPY | Age: 47
End: 2017-06-05
Payer: MEDICAID

## 2017-06-07 ENCOUNTER — APPOINTMENT (OUTPATIENT)
Dept: PHYSICAL THERAPY | Age: 47
End: 2017-06-07
Payer: MEDICAID

## 2017-06-07 ENCOUNTER — OFFICE VISIT (OUTPATIENT)
Dept: ONCOLOGY | Age: 47
End: 2017-06-07
Payer: MEDICAID

## 2017-06-07 VITALS
WEIGHT: 293 LBS | HEART RATE: 86 BPM | RESPIRATION RATE: 22 BRPM | HEIGHT: 66 IN | SYSTOLIC BLOOD PRESSURE: 141 MMHG | DIASTOLIC BLOOD PRESSURE: 86 MMHG | TEMPERATURE: 97.2 F | BODY MASS INDEX: 47.09 KG/M2

## 2017-06-07 DIAGNOSIS — D50.9 IRON DEFICIENCY ANEMIA, UNSPECIFIED IRON DEFICIENCY ANEMIA TYPE: Primary | ICD-10-CM

## 2017-06-07 PROCEDURE — 99214 OFFICE O/P EST MOD 30 MIN: CPT | Performed by: INTERNAL MEDICINE

## 2017-06-07 RX ORDER — POTASSIUM CHLORIDE 750 MG/1
10 TABLET, FILM COATED, EXTENDED RELEASE ORAL DAILY
Refills: 1 | COMMUNITY
Start: 2017-05-31

## 2017-06-07 RX ORDER — DOCUSATE SODIUM 100 MG/1
100 CAPSULE, LIQUID FILLED ORAL 2 TIMES DAILY
COMMUNITY

## 2017-06-09 ENCOUNTER — APPOINTMENT (OUTPATIENT)
Dept: PHYSICAL THERAPY | Age: 47
End: 2017-06-09
Payer: MEDICAID

## 2017-06-13 ENCOUNTER — HOSPITAL ENCOUNTER (OUTPATIENT)
Dept: PHYSICAL THERAPY | Age: 47
Setting detail: THERAPIES SERIES
Discharge: HOME | End: 2017-06-13
Payer: MEDICAID

## 2017-06-13 PROCEDURE — 97110 THERAPEUTIC EXERCISES: CPT

## 2017-06-15 ENCOUNTER — HOSPITAL ENCOUNTER (OUTPATIENT)
Dept: PHYSICAL THERAPY | Age: 47
Setting detail: THERAPIES SERIES
Discharge: HOME | End: 2017-06-15
Payer: MEDICAID

## 2017-06-15 PROCEDURE — 97110 THERAPEUTIC EXERCISES: CPT

## 2017-06-16 ENCOUNTER — HOSPITAL ENCOUNTER (OUTPATIENT)
Dept: PHYSICAL THERAPY | Age: 47
Setting detail: THERAPIES SERIES
Discharge: HOME | End: 2017-06-16
Payer: MEDICAID

## 2017-06-16 PROCEDURE — 97110 THERAPEUTIC EXERCISES: CPT

## 2017-06-19 ENCOUNTER — HOSPITAL ENCOUNTER (OUTPATIENT)
Dept: PHYSICAL THERAPY | Age: 47
Setting detail: THERAPIES SERIES
Discharge: HOME | End: 2017-06-19
Payer: MEDICAID

## 2017-06-19 PROCEDURE — 97110 THERAPEUTIC EXERCISES: CPT

## 2017-06-21 ENCOUNTER — HOSPITAL ENCOUNTER (OUTPATIENT)
Dept: PHYSICAL THERAPY | Age: 47
Setting detail: THERAPIES SERIES
Discharge: HOME | End: 2017-06-21
Payer: MEDICAID

## 2017-06-21 PROCEDURE — 97110 THERAPEUTIC EXERCISES: CPT

## 2017-06-23 ENCOUNTER — HOSPITAL ENCOUNTER (OUTPATIENT)
Dept: PHYSICAL THERAPY | Age: 47
Setting detail: THERAPIES SERIES
Discharge: HOME | End: 2017-06-23
Payer: MEDICAID

## 2017-06-23 PROCEDURE — 97110 THERAPEUTIC EXERCISES: CPT

## 2017-06-23 ASSESSMENT — PAIN SCALES - GENERAL: PAINLEVEL_OUTOF10: 9

## 2017-06-23 ASSESSMENT — PAIN DESCRIPTION - LOCATION: LOCATION: SHOULDER

## 2017-06-23 ASSESSMENT — PAIN DESCRIPTION - ORIENTATION: ORIENTATION: RIGHT

## 2017-06-26 ENCOUNTER — HOSPITAL ENCOUNTER (OUTPATIENT)
Dept: PHYSICAL THERAPY | Age: 47
Setting detail: THERAPIES SERIES
Discharge: HOME | End: 2017-06-26
Payer: MEDICAID

## 2017-06-26 PROCEDURE — 97110 THERAPEUTIC EXERCISES: CPT

## 2017-06-28 ENCOUNTER — HOSPITAL ENCOUNTER (OUTPATIENT)
Dept: PHYSICAL THERAPY | Age: 47
Setting detail: THERAPIES SERIES
Discharge: HOME | End: 2017-06-28
Payer: MEDICAID

## 2017-06-28 PROCEDURE — 97110 THERAPEUTIC EXERCISES: CPT

## 2017-06-30 ENCOUNTER — HOSPITAL ENCOUNTER (OUTPATIENT)
Dept: PHYSICAL THERAPY | Age: 47
Setting detail: THERAPIES SERIES
Discharge: HOME | End: 2017-06-30
Payer: MEDICAID

## 2017-06-30 PROCEDURE — 97110 THERAPEUTIC EXERCISES: CPT

## 2017-06-30 ASSESSMENT — PAIN DESCRIPTION - ORIENTATION: ORIENTATION: RIGHT

## 2017-06-30 ASSESSMENT — PAIN DESCRIPTION - LOCATION: LOCATION: SHOULDER

## 2017-06-30 ASSESSMENT — PAIN SCALES - GENERAL: PAINLEVEL_OUTOF10: 6

## 2017-07-06 ENCOUNTER — OFFICE VISIT (OUTPATIENT)
Dept: BARIATRICS/WEIGHT MGMT | Age: 47
End: 2017-07-06
Payer: MEDICAID

## 2017-07-06 VITALS
BODY MASS INDEX: 47.09 KG/M2 | HEART RATE: 68 BPM | SYSTOLIC BLOOD PRESSURE: 126 MMHG | RESPIRATION RATE: 20 BRPM | DIASTOLIC BLOOD PRESSURE: 76 MMHG | WEIGHT: 293 LBS | HEIGHT: 66 IN

## 2017-07-06 DIAGNOSIS — R73.03 PREDIABETES: ICD-10-CM

## 2017-07-06 DIAGNOSIS — E66.01 MORBID OBESITY WITH BMI OF 60.0-69.9, ADULT (HCC): ICD-10-CM

## 2017-07-06 DIAGNOSIS — G47.33 OSA ON CPAP: ICD-10-CM

## 2017-07-06 DIAGNOSIS — Z99.89 OSA ON CPAP: ICD-10-CM

## 2017-07-06 DIAGNOSIS — Z98.84 S/P LAPAROSCOPIC SLEEVE GASTRECTOMY: ICD-10-CM

## 2017-07-06 DIAGNOSIS — E78.5 HYPERLIPIDEMIA, UNSPECIFIED HYPERLIPIDEMIA TYPE: ICD-10-CM

## 2017-07-06 DIAGNOSIS — I10 HTN, GOAL BELOW 140/90: Primary | ICD-10-CM

## 2017-07-06 DIAGNOSIS — D50.9 IRON DEFICIENCY ANEMIA, UNSPECIFIED IRON DEFICIENCY ANEMIA TYPE: ICD-10-CM

## 2017-07-06 DIAGNOSIS — J45.909 ASTHMA, CHRONIC, UNSPECIFIED ASTHMA SEVERITY, UNCOMPLICATED: ICD-10-CM

## 2017-07-06 DIAGNOSIS — E51.9 THIAMINE DEFICIENCY: ICD-10-CM

## 2017-07-06 DIAGNOSIS — I89.0 LYMPHEDEMA: ICD-10-CM

## 2017-07-06 PROCEDURE — 99213 OFFICE O/P EST LOW 20 MIN: CPT | Performed by: NURSE PRACTITIONER

## 2017-07-07 ENCOUNTER — HOSPITAL ENCOUNTER (OUTPATIENT)
Dept: PHYSICAL THERAPY | Age: 47
Setting detail: THERAPIES SERIES
Discharge: HOME | End: 2017-07-07
Payer: MEDICAID

## 2017-07-07 NOTE — PROGRESS NOTES
[facility]  Inpatient/Observation/Outpatient Rehabilitation    Date: 2017  Patient Name: Nathalia Crouch       [] Inpatient Acute/Observation       []  Outpatient  : 1970       [] Pt no showed for scheduled appointment    [] Pt refused/declined therapy at this time due to:           [x] Pt cancelled due to:  [] No Reason Given   [] Sick/ill   [] Other:Transportation issue  Will attempt evaluation at our earliest availability.        Brittany Elkins Date: 2017

## 2017-07-10 ENCOUNTER — HOSPITAL ENCOUNTER (OUTPATIENT)
Dept: PHYSICAL THERAPY | Age: 47
Setting detail: THERAPIES SERIES
Discharge: HOME | End: 2017-07-10
Payer: MEDICAID

## 2017-07-10 PROCEDURE — 97110 THERAPEUTIC EXERCISES: CPT

## 2017-07-12 ENCOUNTER — HOSPITAL ENCOUNTER (OUTPATIENT)
Dept: PHYSICAL THERAPY | Age: 47
Setting detail: THERAPIES SERIES
Discharge: HOME | End: 2017-07-12
Payer: MEDICAID

## 2017-07-12 PROCEDURE — 97140 MANUAL THERAPY 1/> REGIONS: CPT

## 2017-07-12 PROCEDURE — 97110 THERAPEUTIC EXERCISES: CPT

## 2017-07-12 ASSESSMENT — PAIN SCALES - GENERAL
PAINLEVEL_OUTOF10: 7

## 2017-07-12 ASSESSMENT — PAIN DESCRIPTION - LOCATION
LOCATION: SHOULDER

## 2017-07-12 ASSESSMENT — PAIN DESCRIPTION - ORIENTATION
ORIENTATION: RIGHT

## 2017-07-14 ENCOUNTER — HOSPITAL ENCOUNTER (OUTPATIENT)
Dept: PHYSICAL THERAPY | Age: 47
Setting detail: THERAPIES SERIES
Discharge: HOME | End: 2017-07-14
Payer: MEDICAID

## 2017-07-17 ENCOUNTER — HOSPITAL ENCOUNTER (OUTPATIENT)
Dept: PHYSICAL THERAPY | Age: 47
Setting detail: THERAPIES SERIES
Discharge: HOME | End: 2017-07-17
Payer: MEDICAID

## 2017-07-17 NOTE — PROGRESS NOTES
[facility]  Inpatient/Observation/Outpatient Rehabilitation    Date: 2017  Patient Name: Yancy Cash       [] Inpatient Acute/Observation       []  Outpatient  : 1970       [] Pt no showed for scheduled appointment    [] Pt refused/declined therapy at this time due to:           [x] Pt cancelled due to:  [] No Reason Given   [x] Sick/ill   [] Other:    Patient on voice mail still very sick will schedule when she comes in next time.     Jud Wright Date: 2017

## 2017-07-19 ENCOUNTER — HOSPITAL ENCOUNTER (OUTPATIENT)
Dept: PHYSICAL THERAPY | Age: 47
Setting detail: THERAPIES SERIES
Discharge: HOME | End: 2017-07-19
Payer: MEDICAID

## 2017-07-19 PROCEDURE — 97110 THERAPEUTIC EXERCISES: CPT

## 2017-07-19 ASSESSMENT — PAIN DESCRIPTION - LOCATION: LOCATION: SHOULDER

## 2017-07-19 ASSESSMENT — PAIN SCALES - GENERAL: PAINLEVEL_OUTOF10: 6

## 2017-07-19 ASSESSMENT — PAIN DESCRIPTION - ORIENTATION: ORIENTATION: RIGHT

## 2017-07-20 ENCOUNTER — OFFICE VISIT (OUTPATIENT)
Dept: PULMONOLOGY | Age: 47
End: 2017-07-20
Payer: MEDICAID

## 2017-07-20 VITALS
BODY MASS INDEX: 48.82 KG/M2 | HEART RATE: 64 BPM | OXYGEN SATURATION: 99 % | DIASTOLIC BLOOD PRESSURE: 79 MMHG | TEMPERATURE: 98.6 F | SYSTOLIC BLOOD PRESSURE: 150 MMHG | HEIGHT: 65 IN | WEIGHT: 293 LBS | RESPIRATION RATE: 16 BRPM

## 2017-07-20 DIAGNOSIS — J45.50 UNCOMPLICATED SEVERE PERSISTENT ASTHMA: ICD-10-CM

## 2017-07-20 DIAGNOSIS — E66.01 MORBID OBESITY WITH BMI OF 60.0-69.9, ADULT (HCC): ICD-10-CM

## 2017-07-20 DIAGNOSIS — G47.33 OSA ON CPAP: Primary | ICD-10-CM

## 2017-07-20 DIAGNOSIS — Z99.89 OSA ON CPAP: Primary | ICD-10-CM

## 2017-07-20 PROCEDURE — 99214 OFFICE O/P EST MOD 30 MIN: CPT | Performed by: INTERNAL MEDICINE

## 2017-07-20 RX ORDER — MONTELUKAST SODIUM 10 MG/1
TABLET ORAL
Qty: 30 TABLET | Refills: 11 | Status: SHIPPED | OUTPATIENT
Start: 2017-07-20 | End: 2018-07-20 | Stop reason: SDUPTHER

## 2017-07-20 RX ORDER — FLUTICASONE PROPIONATE 50 MCG
2 SPRAY, SUSPENSION (ML) NASAL DAILY
Qty: 16 G | Refills: 11 | Status: SHIPPED | OUTPATIENT
Start: 2017-07-20

## 2017-07-20 RX ORDER — FLUTICASONE FUROATE AND VILANTEROL 200; 25 UG/1; UG/1
1 POWDER RESPIRATORY (INHALATION) DAILY
Qty: 1 EACH | Refills: 11 | Status: SHIPPED | OUTPATIENT
Start: 2017-07-20 | End: 2018-02-01 | Stop reason: DRUGHIGH

## 2017-07-20 RX ORDER — ALBUTEROL SULFATE 90 UG/1
2 AEROSOL, METERED RESPIRATORY (INHALATION) EVERY 6 HOURS PRN
Qty: 36 G | Refills: 11 | Status: SHIPPED | OUTPATIENT
Start: 2017-07-20 | End: 2018-01-02 | Stop reason: ALTCHOICE

## 2017-07-20 ASSESSMENT — ENCOUNTER SYMPTOMS
WHEEZING: 0
ALLERGIC/IMMUNOLOGIC NEGATIVE: 1
STRIDOR: 0
SHORTNESS OF BREATH: 1
EYES NEGATIVE: 1
CHOKING: 1
GASTROINTESTINAL NEGATIVE: 1
CHEST TIGHTNESS: 0
APNEA: 1

## 2017-07-21 ENCOUNTER — HOSPITAL ENCOUNTER (OUTPATIENT)
Dept: PHYSICAL THERAPY | Age: 47
Setting detail: THERAPIES SERIES
Discharge: HOME | End: 2017-07-21
Payer: MEDICAID

## 2017-07-21 PROCEDURE — 97110 THERAPEUTIC EXERCISES: CPT

## 2017-07-24 ENCOUNTER — HOSPITAL ENCOUNTER (OUTPATIENT)
Dept: PHYSICAL THERAPY | Age: 47
Setting detail: THERAPIES SERIES
Discharge: HOME | End: 2017-07-24
Payer: MEDICAID

## 2017-07-24 PROCEDURE — 97110 THERAPEUTIC EXERCISES: CPT

## 2017-07-26 ENCOUNTER — HOSPITAL ENCOUNTER (OUTPATIENT)
Dept: PHYSICAL THERAPY | Age: 47
Setting detail: THERAPIES SERIES
Discharge: HOME | End: 2017-07-26
Payer: MEDICAID

## 2017-07-26 PROCEDURE — 97110 THERAPEUTIC EXERCISES: CPT

## 2017-07-28 ENCOUNTER — HOSPITAL ENCOUNTER (OUTPATIENT)
Dept: PHYSICAL THERAPY | Age: 47
Setting detail: THERAPIES SERIES
Discharge: HOME | End: 2017-07-28
Payer: MEDICAID

## 2017-07-28 PROCEDURE — 97110 THERAPEUTIC EXERCISES: CPT

## 2017-07-31 ENCOUNTER — HOSPITAL ENCOUNTER (OUTPATIENT)
Dept: PHYSICAL THERAPY | Age: 47
Setting detail: THERAPIES SERIES
Discharge: HOME | End: 2017-07-31
Payer: MEDICAID

## 2017-07-31 NOTE — PROGRESS NOTES
[facility]  Inpatient/Observation/Outpatient Rehabilitation    Date: 2017  Patient Name: Jadon Robledo       [] Inpatient Acute/Observation       []  Outpatient  : 1970       [] Pt no showed for scheduled appointment    [] Pt refused/declined therapy at this time due to:           [x] Pt cancelled due to:  [] No Reason Given   [] Sick/ill   [] Other: Patient legs are swollen today has appointments scheduled out through August.          Tameka Glover Date: 2017

## 2017-08-02 ENCOUNTER — HOSPITAL ENCOUNTER (OUTPATIENT)
Dept: PHYSICAL THERAPY | Age: 47
Setting detail: THERAPIES SERIES
Discharge: HOME | End: 2017-08-02
Payer: MEDICAID

## 2017-08-02 PROCEDURE — 97110 THERAPEUTIC EXERCISES: CPT

## 2017-08-04 ENCOUNTER — HOSPITAL ENCOUNTER (OUTPATIENT)
Dept: PHYSICAL THERAPY | Age: 47
Setting detail: THERAPIES SERIES
Discharge: HOME | End: 2017-08-04
Payer: MEDICAID

## 2017-08-04 NOTE — PROGRESS NOTES
[facility]  Inpatient/Observation/Outpatient Rehabilitation    Date: 2017  Patient Name: Lisandro Chahal       [] Inpatient Acute/Observation       []  Outpatient  : 1970       [] Pt no showed for scheduled appointment    [] Pt refused/declined therapy at this time due to:           [x] Pt cancelled due to:  [] No Reason Given   [x] Sick/ill   [] Other:    Patient on voice mail very sick already has appointments scheduled      [facility] Date: 2017

## 2017-08-07 ENCOUNTER — HOSPITAL ENCOUNTER (OUTPATIENT)
Dept: PHYSICAL THERAPY | Age: 47
Setting detail: THERAPIES SERIES
Discharge: HOME | End: 2017-08-07
Payer: MEDICAID

## 2017-08-07 PROCEDURE — 97110 THERAPEUTIC EXERCISES: CPT

## 2017-08-07 ASSESSMENT — PAIN SCALES - GENERAL: PAINLEVEL_OUTOF10: 5

## 2017-08-09 ENCOUNTER — HOSPITAL ENCOUNTER (OUTPATIENT)
Dept: PHYSICAL THERAPY | Age: 47
Setting detail: THERAPIES SERIES
Discharge: HOME | End: 2017-08-09
Payer: MEDICAID

## 2017-08-09 PROCEDURE — 97140 MANUAL THERAPY 1/> REGIONS: CPT

## 2017-08-09 PROCEDURE — 97110 THERAPEUTIC EXERCISES: CPT

## 2017-08-09 ASSESSMENT — PAIN DESCRIPTION - LOCATION: LOCATION: SHOULDER

## 2017-08-09 ASSESSMENT — PAIN DESCRIPTION - ORIENTATION: ORIENTATION: RIGHT

## 2017-08-09 ASSESSMENT — PAIN SCALES - GENERAL: PAINLEVEL_OUTOF10: 8

## 2017-08-10 ENCOUNTER — HOSPITAL ENCOUNTER (OUTPATIENT)
Dept: SLEEP CENTER | Age: 47
Discharge: HOME | End: 2017-08-10
Payer: MEDICAID

## 2017-08-10 DIAGNOSIS — G47.33 OSA ON CPAP: ICD-10-CM

## 2017-08-10 DIAGNOSIS — Z99.89 OSA ON CPAP: ICD-10-CM

## 2017-08-10 PROCEDURE — 95810 POLYSOM 6/> YRS 4/> PARAM: CPT

## 2017-08-11 ENCOUNTER — HOSPITAL ENCOUNTER (OUTPATIENT)
Dept: PHYSICAL THERAPY | Age: 47
Setting detail: THERAPIES SERIES
Discharge: HOME | End: 2017-08-11
Payer: MEDICAID

## 2017-08-11 PROCEDURE — 97140 MANUAL THERAPY 1/> REGIONS: CPT

## 2017-08-11 PROCEDURE — 97110 THERAPEUTIC EXERCISES: CPT

## 2017-08-11 ASSESSMENT — PAIN DESCRIPTION - LOCATION: LOCATION: SHOULDER

## 2017-08-11 ASSESSMENT — PAIN DESCRIPTION - ORIENTATION: ORIENTATION: RIGHT

## 2017-08-11 ASSESSMENT — PAIN SCALES - GENERAL: PAINLEVEL_OUTOF10: 6

## 2017-08-14 ENCOUNTER — HOSPITAL ENCOUNTER (OUTPATIENT)
Dept: PHYSICAL THERAPY | Age: 47
Setting detail: THERAPIES SERIES
Discharge: HOME | End: 2017-08-14
Payer: MEDICAID

## 2017-08-14 PROCEDURE — 97110 THERAPEUTIC EXERCISES: CPT

## 2017-08-15 ASSESSMENT — PAIN SCALES - GENERAL: PAINLEVEL_OUTOF10: 6

## 2017-08-16 ENCOUNTER — APPOINTMENT (OUTPATIENT)
Dept: PHYSICAL THERAPY | Age: 47
End: 2017-08-16
Payer: MEDICAID

## 2017-08-18 ENCOUNTER — HOSPITAL ENCOUNTER (OUTPATIENT)
Dept: PHYSICAL THERAPY | Age: 47
Setting detail: THERAPIES SERIES
Discharge: HOME | End: 2017-08-18
Payer: MEDICAID

## 2017-08-18 PROCEDURE — 97110 THERAPEUTIC EXERCISES: CPT

## 2017-08-21 ENCOUNTER — HOSPITAL ENCOUNTER (OUTPATIENT)
Dept: PHYSICAL THERAPY | Age: 47
Setting detail: THERAPIES SERIES
Discharge: HOME | End: 2017-08-21
Payer: MEDICAID

## 2017-08-21 PROCEDURE — 97110 THERAPEUTIC EXERCISES: CPT

## 2017-08-23 ENCOUNTER — HOSPITAL ENCOUNTER (OUTPATIENT)
Dept: PHYSICAL THERAPY | Age: 47
Setting detail: THERAPIES SERIES
Discharge: HOME | End: 2017-08-23
Payer: MEDICAID

## 2017-08-23 PROCEDURE — 97110 THERAPEUTIC EXERCISES: CPT

## 2017-08-25 ENCOUNTER — HOSPITAL ENCOUNTER (OUTPATIENT)
Dept: PHYSICAL THERAPY | Age: 47
Setting detail: THERAPIES SERIES
Discharge: HOME | End: 2017-08-25
Payer: MEDICAID

## 2017-08-25 PROCEDURE — 97110 THERAPEUTIC EXERCISES: CPT

## 2017-08-28 ENCOUNTER — HOSPITAL ENCOUNTER (OUTPATIENT)
Dept: PHYSICAL THERAPY | Age: 47
Setting detail: THERAPIES SERIES
Discharge: HOME | End: 2017-08-28
Payer: MEDICAID

## 2017-08-28 PROCEDURE — 97110 THERAPEUTIC EXERCISES: CPT

## 2017-08-30 ENCOUNTER — HOSPITAL ENCOUNTER (OUTPATIENT)
Dept: PHYSICAL THERAPY | Age: 47
Setting detail: THERAPIES SERIES
Discharge: HOME | End: 2017-08-30
Payer: MEDICAID

## 2017-08-30 PROCEDURE — 97110 THERAPEUTIC EXERCISES: CPT

## 2017-09-01 ENCOUNTER — HOSPITAL ENCOUNTER (OUTPATIENT)
Dept: PHYSICAL THERAPY | Age: 47
Setting detail: THERAPIES SERIES
Discharge: HOME | End: 2017-09-01
Payer: MEDICAID

## 2017-09-01 PROCEDURE — 97110 THERAPEUTIC EXERCISES: CPT

## 2017-09-06 ENCOUNTER — APPOINTMENT (OUTPATIENT)
Dept: PHYSICAL THERAPY | Age: 47
End: 2017-09-06
Payer: MEDICAID

## 2017-09-08 ENCOUNTER — APPOINTMENT (OUTPATIENT)
Dept: PHYSICAL THERAPY | Age: 47
End: 2017-09-08
Payer: MEDICAID

## 2017-09-11 ENCOUNTER — APPOINTMENT (OUTPATIENT)
Dept: PHYSICAL THERAPY | Age: 47
End: 2017-09-11
Payer: MEDICAID

## 2017-09-13 ENCOUNTER — APPOINTMENT (OUTPATIENT)
Dept: PHYSICAL THERAPY | Age: 47
End: 2017-09-13
Payer: MEDICAID

## 2017-09-15 ENCOUNTER — HOSPITAL ENCOUNTER (OUTPATIENT)
Age: 47
Discharge: HOME | End: 2017-09-15
Payer: MEDICAID

## 2017-09-15 ENCOUNTER — APPOINTMENT (OUTPATIENT)
Dept: PHYSICAL THERAPY | Age: 47
End: 2017-09-15
Payer: MEDICAID

## 2017-09-15 LAB
ABSOLUTE EOS #: 0.2 K/UL (ref 0–0.4)
ABSOLUTE LYMPH #: 1.8 K/UL (ref 1–4.8)
ABSOLUTE MONO #: 0.4 K/UL (ref 0–1)
ALBUMIN SERPL-MCNC: 4 G/DL (ref 3.5–5.2)
ALBUMIN/GLOBULIN RATIO: 1 (ref 1–2.5)
ALP BLD-CCNC: 83 U/L (ref 35–104)
ALT SERPL-CCNC: 6 U/L (ref 5–33)
ANION GAP SERPL CALCULATED.3IONS-SCNC: 15 MMOL/L (ref 9–17)
AST SERPL-CCNC: 10 U/L
BASOPHILS # BLD: 1 %
BASOPHILS ABSOLUTE: 0 K/UL (ref 0–0.2)
BILIRUB SERPL-MCNC: 0.43 MG/DL (ref 0.3–1.2)
BUN BLDV-MCNC: 18 MG/DL (ref 6–20)
BUN/CREAT BLD: 14 (ref 9–20)
CALCIUM SERPL-MCNC: 9.7 MG/DL (ref 8.6–10.4)
CHLORIDE BLD-SCNC: 96 MMOL/L (ref 98–107)
CO2: 24 MMOL/L (ref 20–31)
CREAT SERPL-MCNC: 1.32 MG/DL (ref 0.5–0.9)
DIFFERENTIAL TYPE: ABNORMAL
EOSINOPHILS RELATIVE PERCENT: 3 %
GFR AFRICAN AMERICAN: 53 ML/MIN
GFR NON-AFRICAN AMERICAN: 43 ML/MIN
GFR SERPL CREATININE-BSD FRML MDRD: ABNORMAL ML/MIN/{1.73_M2}
GFR SERPL CREATININE-BSD FRML MDRD: ABNORMAL ML/MIN/{1.73_M2}
GLUCOSE BLD-MCNC: 106 MG/DL (ref 70–99)
HCT VFR BLD CALC: 37.2 % (ref 36–46)
HEMOGLOBIN: 12 G/DL (ref 12–16)
IRON: 35 UG/DL (ref 37–145)
LYMPHOCYTES # BLD: 32 %
MCH RBC QN AUTO: 27.3 PG (ref 26–34)
MCHC RBC AUTO-ENTMCNC: 32.4 G/DL (ref 31–37)
MCV RBC AUTO: 84.3 FL (ref 80–100)
MONOCYTES # BLD: 8 %
PDW BLD-RTO: 15.7 % (ref 12.1–15.2)
PLATELET # BLD: 335 K/UL (ref 140–450)
PLATELET ESTIMATE: ABNORMAL
PMV BLD AUTO: 9.4 FL (ref 6–12)
POTASSIUM SERPL-SCNC: 3.7 MMOL/L (ref 3.7–5.3)
RBC # BLD: 4.41 M/UL (ref 4–5.2)
RBC # BLD: ABNORMAL 10*6/UL
SEG NEUTROPHILS: 56 %
SEGMENTED NEUTROPHILS ABSOLUTE COUNT: 3.3 K/UL (ref 1.8–7.7)
SODIUM BLD-SCNC: 135 MMOL/L (ref 135–144)
TOTAL PROTEIN: 8.1 G/DL (ref 6.4–8.3)
TSH SERPL DL<=0.05 MIU/L-ACNC: 1.33 MIU/L (ref 0.3–5)
VITAMIN D 25-HYDROXY: 30 NG/ML (ref 30–100)
WBC # BLD: 5.8 K/UL (ref 3.5–11)
WBC # BLD: ABNORMAL 10*3/UL

## 2017-09-15 PROCEDURE — 85025 COMPLETE CBC W/AUTO DIFF WBC: CPT

## 2017-09-15 PROCEDURE — 84425 ASSAY OF VITAMIN B-1: CPT

## 2017-09-15 PROCEDURE — 80053 COMPREHEN METABOLIC PANEL: CPT

## 2017-09-15 PROCEDURE — 84443 ASSAY THYROID STIM HORMONE: CPT

## 2017-09-15 PROCEDURE — 83540 ASSAY OF IRON: CPT

## 2017-09-15 PROCEDURE — 82306 VITAMIN D 25 HYDROXY: CPT

## 2017-09-15 PROCEDURE — 36415 COLL VENOUS BLD VENIPUNCTURE: CPT

## 2017-09-18 ENCOUNTER — APPOINTMENT (OUTPATIENT)
Dept: PHYSICAL THERAPY | Age: 47
End: 2017-09-18
Payer: MEDICAID

## 2017-09-20 ENCOUNTER — APPOINTMENT (OUTPATIENT)
Dept: PHYSICAL THERAPY | Age: 47
End: 2017-09-20
Payer: MEDICAID

## 2017-09-21 LAB — VITAMIN B1 WHOLE BLOOD: 37 NMOL/L (ref 70–180)

## 2017-09-22 ENCOUNTER — APPOINTMENT (OUTPATIENT)
Dept: PHYSICAL THERAPY | Age: 47
End: 2017-09-22
Payer: MEDICAID

## 2017-09-25 ENCOUNTER — APPOINTMENT (OUTPATIENT)
Dept: PHYSICAL THERAPY | Age: 47
End: 2017-09-25
Payer: MEDICAID

## 2017-09-26 ENCOUNTER — OFFICE VISIT (OUTPATIENT)
Dept: ORTHOPEDIC SURGERY | Age: 47
End: 2017-09-26
Payer: MEDICAID

## 2017-09-26 DIAGNOSIS — S43.084D: ICD-10-CM

## 2017-09-26 DIAGNOSIS — S42.91XD SHOULDER FRACTURE, RIGHT, WITH ROUTINE HEALING, SUBSEQUENT ENCOUNTER: ICD-10-CM

## 2017-09-26 DIAGNOSIS — S43.003A SUBLUXATION OF TENDON OF LONG HEAD OF BICEPS: ICD-10-CM

## 2017-09-26 DIAGNOSIS — M48.061 LUMBAR STENOSIS: Primary | ICD-10-CM

## 2017-09-26 PROCEDURE — 99213 OFFICE O/P EST LOW 20 MIN: CPT | Performed by: ORTHOPAEDIC SURGERY

## 2017-09-26 RX ORDER — PANTOPRAZOLE SODIUM 40 MG/1
TABLET, DELAYED RELEASE ORAL
Qty: 30 TABLET | Refills: 3 | Status: SHIPPED | OUTPATIENT
Start: 2017-09-26

## 2017-09-26 ASSESSMENT — ENCOUNTER SYMPTOMS
ABDOMINAL PAIN: 0
VOMITING: 0
COUGH: 0
WHEEZING: 0
EYE PAIN: 0
NAUSEA: 0
COLOR CHANGE: 0
ABDOMINAL DISTENTION: 0
SHORTNESS OF BREATH: 0

## 2017-09-27 ENCOUNTER — APPOINTMENT (OUTPATIENT)
Dept: PHYSICAL THERAPY | Age: 47
End: 2017-09-27
Payer: MEDICAID

## 2017-09-29 ENCOUNTER — APPOINTMENT (OUTPATIENT)
Dept: PHYSICAL THERAPY | Age: 47
End: 2017-09-29
Payer: MEDICAID

## 2017-10-06 ENCOUNTER — TELEPHONE (OUTPATIENT)
Dept: ORTHOPEDIC SURGERY | Age: 47
End: 2017-10-06

## 2017-10-06 NOTE — TELEPHONE ENCOUNTER
Copy of referral faxed to PCP as directed. The phone # in the computer went to opal Pritchett? ? Janie Nieto

## 2017-10-11 ENCOUNTER — OFFICE VISIT (OUTPATIENT)
Dept: BARIATRICS/WEIGHT MGMT | Age: 47
End: 2017-10-11
Payer: MEDICAID

## 2017-10-11 VITALS
RESPIRATION RATE: 20 BRPM | HEIGHT: 66 IN | WEIGHT: 293 LBS | BODY MASS INDEX: 47.09 KG/M2 | DIASTOLIC BLOOD PRESSURE: 81 MMHG | HEART RATE: 83 BPM | SYSTOLIC BLOOD PRESSURE: 139 MMHG

## 2017-10-11 DIAGNOSIS — E51.9 THIAMINE DEFICIENCY: ICD-10-CM

## 2017-10-11 DIAGNOSIS — E55.9 VITAMIN D DEFICIENCY: ICD-10-CM

## 2017-10-11 DIAGNOSIS — I89.0 LYMPHEDEMA: Primary | ICD-10-CM

## 2017-10-11 DIAGNOSIS — R73.03 PREDIABETES: ICD-10-CM

## 2017-10-11 DIAGNOSIS — Z98.84 S/P LAPAROSCOPIC SLEEVE GASTRECTOMY: ICD-10-CM

## 2017-10-11 PROCEDURE — 99213 OFFICE O/P EST LOW 20 MIN: CPT | Performed by: SURGERY

## 2017-10-11 RX ORDER — PANTOPRAZOLE SODIUM 40 MG/1
40 TABLET, DELAYED RELEASE ORAL DAILY
Qty: 30 TABLET | Refills: 3 | Status: SHIPPED | OUTPATIENT
Start: 2017-10-11 | End: 2018-01-02 | Stop reason: SDUPTHER

## 2017-10-11 NOTE — PROGRESS NOTES
Patient is 1 year s/p sleeve gastrectomy. Overall, doing well. Total weight loss:  85 lbs    Complaints:  Struggling with getting enough protein     Consistent with MVI/Ca:  Yes but taking an OTC    Adequate hydration:  yes    Goal sheet reviewed with patient    Physical Exam:  Vitals:    10/11/17 1255   BP: 139/81   Site: Left Arm   Position: Sitting   Cuff Size: Large Adult   Pulse: 83   Resp: 20   Weight: (!) 380 lb (172.4 kg)   Height: 5' 6\" (1.676 m)     Constitutional:  Vital signs are normal. The patient appears well-developed and well-nourished. Abdominal: Soft. No tenderness. Musculoskeletal:        Right lower leg: Normal. No tenderness and no edema. Left lower leg: Normal. No tenderness and no edema. Lymphadenopathy:     No cervical adenopathy, No Exrtemity Adenopathy. Neurological: The patient is alert and oriented. Skin: Skin is warm, dry and intact. Psychiatric: The patient has a normal mood and affect.  Speech is normal and behavior is normal. Judgment and thought content normal. Cognition and memory are normal.     Assessment:  Patient Active Problem List   Diagnosis    Pain in joint, lower leg    Myalgia and myositis    Lymphedema    KRISSY (iron deficiency anemia)    HTN, goal below 140/90    DAVIDSON on CPAP    Asthma, chronic    Allergic rhinitis    Vitamin D deficiency    Chronic low back pain    Hyperlipidemia    Cholelithiasis    Arthritis    Thiamine deficiency    Chronic cholecystitis    Ganglion cyst    Closed fracture of acromial process of right scapula    Closed dislocation of glenohumeral joint    Shoulder fracture, right    Morbid obesity with BMI of 60.0-69.9, adult (HCC)    LUQ abdominal pain    S/P laparoscopic sleeve gastrectomy    Subluxation of tendon of long head of biceps    Prediabetes    Headaches due to old head injury    Hearing loss    Lumbar stenosis     Lymphedema-improving  DAVIDSON-stable  Bariatric Surgery
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
## 2017-12-12 ENCOUNTER — OFFICE VISIT (OUTPATIENT)
Dept: ORTHOPEDIC SURGERY | Age: 47
End: 2017-12-12
Payer: MEDICAID

## 2017-12-12 VITALS — WEIGHT: 293 LBS | BODY MASS INDEX: 47.09 KG/M2 | HEIGHT: 66 IN

## 2017-12-12 DIAGNOSIS — S43.084D CLOSED DISLOCATION OF RIGHT GLENOHUMERAL JOINT, SUBSEQUENT ENCOUNTER: Primary | ICD-10-CM

## 2017-12-12 DIAGNOSIS — S43.003A SUBLUXATION OF TENDON OF LONG HEAD OF BICEPS: ICD-10-CM

## 2017-12-12 PROCEDURE — G8484 FLU IMMUNIZE NO ADMIN: HCPCS | Performed by: ORTHOPAEDIC SURGERY

## 2017-12-12 PROCEDURE — G8427 DOCREV CUR MEDS BY ELIG CLIN: HCPCS | Performed by: ORTHOPAEDIC SURGERY

## 2017-12-12 PROCEDURE — 1036F TOBACCO NON-USER: CPT | Performed by: ORTHOPAEDIC SURGERY

## 2017-12-12 PROCEDURE — G8417 CALC BMI ABV UP PARAM F/U: HCPCS | Performed by: ORTHOPAEDIC SURGERY

## 2017-12-12 PROCEDURE — 99213 OFFICE O/P EST LOW 20 MIN: CPT | Performed by: ORTHOPAEDIC SURGERY
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
## 2017-12-20 ENCOUNTER — HOSPITAL ENCOUNTER (OUTPATIENT)
Age: 47
Discharge: HOME | End: 2017-12-20
Payer: MEDICAID

## 2017-12-20 DIAGNOSIS — D50.9 IRON DEFICIENCY ANEMIA, UNSPECIFIED IRON DEFICIENCY ANEMIA TYPE: ICD-10-CM

## 2017-12-20 LAB
ABSOLUTE EOS #: 0.2 K/UL (ref 0–0.4)
ABSOLUTE IMMATURE GRANULOCYTE: ABNORMAL K/UL (ref 0–0.3)
ABSOLUTE LYMPH #: 1.9 K/UL (ref 1–4.8)
ABSOLUTE MONO #: 0.5 K/UL (ref 0–1)
BASOPHILS # BLD: 1 % (ref 0–2)
BASOPHILS ABSOLUTE: 0.1 K/UL (ref 0–0.2)
DIFFERENTIAL TYPE: ABNORMAL
EOSINOPHILS RELATIVE PERCENT: 4 % (ref 0–8)
FERRITIN: 90 UG/L (ref 13–150)
HCT VFR BLD CALC: 37.5 % (ref 36–46)
HEMOGLOBIN: 11.7 G/DL (ref 12–16)
IMMATURE GRANULOCYTES: ABNORMAL %
IRON SATURATION: 13 % (ref 20–55)
IRON: 34 UG/DL (ref 37–145)
LYMPHOCYTES # BLD: 30 % (ref 24–44)
MCH RBC QN AUTO: 26.5 PG (ref 26–34)
MCHC RBC AUTO-ENTMCNC: 31.2 G/DL (ref 31–37)
MCV RBC AUTO: 84.9 FL (ref 80–100)
MONOCYTES # BLD: 8 % (ref 0–12)
PDW BLD-RTO: 16.9 % (ref 12.1–15.2)
PLATELET # BLD: 314 K/UL (ref 140–450)
PLATELET ESTIMATE: ABNORMAL
PMV BLD AUTO: 9.6 FL (ref 6–12)
RBC # BLD: 4.42 M/UL (ref 4–5.2)
RBC # BLD: ABNORMAL 10*6/UL
SEG NEUTROPHILS: 57 % (ref 36–66)
SEGMENTED NEUTROPHILS ABSOLUTE COUNT: 3.7 K/UL (ref 1.8–7.7)
TOTAL IRON BINDING CAPACITY: 264 UG/DL (ref 250–450)
UNSATURATED IRON BINDING CAPACITY: 229.5 UG/DL (ref 112–347)
WBC # BLD: 6.3 K/UL (ref 3.5–11)
WBC # BLD: ABNORMAL 10*3/UL

## 2017-12-20 PROCEDURE — 83550 IRON BINDING TEST: CPT

## 2017-12-20 PROCEDURE — 82728 ASSAY OF FERRITIN: CPT

## 2017-12-20 PROCEDURE — 83540 ASSAY OF IRON: CPT

## 2017-12-20 PROCEDURE — 36415 COLL VENOUS BLD VENIPUNCTURE: CPT

## 2017-12-20 PROCEDURE — 85025 COMPLETE CBC W/AUTO DIFF WBC: CPT

## 2018-01-02 ENCOUNTER — OFFICE VISIT (OUTPATIENT)
Dept: BARIATRICS/WEIGHT MGMT | Age: 48
End: 2018-01-02
Payer: MEDICAID

## 2018-01-02 VITALS
SYSTOLIC BLOOD PRESSURE: 128 MMHG | HEART RATE: 76 BPM | DIASTOLIC BLOOD PRESSURE: 72 MMHG | HEIGHT: 66 IN | WEIGHT: 293 LBS | BODY MASS INDEX: 47.09 KG/M2

## 2018-01-02 DIAGNOSIS — E78.2 MIXED HYPERLIPIDEMIA: ICD-10-CM

## 2018-01-02 DIAGNOSIS — G47.33 OSA ON CPAP: ICD-10-CM

## 2018-01-02 DIAGNOSIS — E51.9 THIAMINE DEFICIENCY: ICD-10-CM

## 2018-01-02 DIAGNOSIS — I89.0 LYMPHEDEMA: Primary | ICD-10-CM

## 2018-01-02 DIAGNOSIS — Z99.89 OSA ON CPAP: ICD-10-CM

## 2018-01-02 PROCEDURE — 99213 OFFICE O/P EST LOW 20 MIN: CPT | Performed by: SURGERY

## 2018-01-02 RX ORDER — DIOSMIN COMPLEX NO.1 630 MG
630 TABLET ORAL DAILY
COMMUNITY

## 2018-01-02 RX ORDER — METOCLOPRAMIDE 5 MG/1
5 TABLET ORAL 3 TIMES DAILY
Qty: 120 TABLET | Refills: 3 | Status: SHIPPED | OUTPATIENT
Start: 2018-01-02

## 2018-01-02 RX ORDER — RANITIDINE 300 MG/1
300 TABLET ORAL NIGHTLY
Qty: 30 TABLET | Refills: 3 | Status: SHIPPED | OUTPATIENT
Start: 2018-01-02

## 2018-01-02 NOTE — PROGRESS NOTES
protien/day, and at least 48-64oz of fluid daily  Reinforced need for regular exercise  Reinforced need for consistency with MVI and Ca  Return in 3 months

## 2018-01-03 ENCOUNTER — OFFICE VISIT (OUTPATIENT)
Dept: ONCOLOGY | Age: 48
End: 2018-01-03
Payer: MEDICAID

## 2018-01-03 ENCOUNTER — TELEPHONE (OUTPATIENT)
Dept: BARIATRICS/WEIGHT MGMT | Age: 48
End: 2018-01-03

## 2018-01-03 VITALS
RESPIRATION RATE: 16 BRPM | HEIGHT: 66 IN | SYSTOLIC BLOOD PRESSURE: 132 MMHG | BODY MASS INDEX: 47.09 KG/M2 | HEART RATE: 73 BPM | DIASTOLIC BLOOD PRESSURE: 79 MMHG | TEMPERATURE: 97.4 F | WEIGHT: 293 LBS

## 2018-01-03 DIAGNOSIS — D50.9 IRON DEFICIENCY ANEMIA, UNSPECIFIED IRON DEFICIENCY ANEMIA TYPE: Primary | ICD-10-CM

## 2018-01-03 DIAGNOSIS — E66.01 MORBID OBESITY WITH BMI OF 60.0-69.9, ADULT (HCC): ICD-10-CM

## 2018-01-03 PROCEDURE — G8484 FLU IMMUNIZE NO ADMIN: HCPCS | Performed by: INTERNAL MEDICINE

## 2018-01-03 PROCEDURE — G8417 CALC BMI ABV UP PARAM F/U: HCPCS | Performed by: INTERNAL MEDICINE

## 2018-01-03 PROCEDURE — 1036F TOBACCO NON-USER: CPT | Performed by: INTERNAL MEDICINE

## 2018-01-03 PROCEDURE — 99214 OFFICE O/P EST MOD 30 MIN: CPT | Performed by: INTERNAL MEDICINE

## 2018-01-03 PROCEDURE — G8427 DOCREV CUR MEDS BY ELIG CLIN: HCPCS | Performed by: INTERNAL MEDICINE

## 2018-01-03 RX ORDER — TOPIRAMATE 50 MG/1
50 TABLET, FILM COATED ORAL DAILY
Refills: 0 | COMMUNITY
Start: 2017-12-24 | End: 2018-12-12 | Stop reason: ALTCHOICE

## 2018-01-03 RX ORDER — TIZANIDINE 4 MG/1
4 TABLET ORAL PRN
Refills: 0 | COMMUNITY
Start: 2017-11-27

## 2018-01-03 RX ORDER — BUDESONIDE AND FORMOTEROL FUMARATE DIHYDRATE 160; 4.5 UG/1; UG/1
2 AEROSOL RESPIRATORY (INHALATION) DAILY
COMMUNITY
End: 2018-02-01 | Stop reason: DRUGHIGH

## 2018-01-03 NOTE — TELEPHONE ENCOUNTER
Pharmacy called asking if patient should still be on Protonix? She was given prescription for Zantac 1/2/18.
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
## 2018-01-08 ENCOUNTER — TELEPHONE (OUTPATIENT)
Dept: BARIATRICS/WEIGHT MGMT | Age: 48
End: 2018-01-08

## 2018-01-09 PROBLEM — S06.9XAA TBI (TRAUMATIC BRAIN INJURY): Status: ACTIVE | Noted: 2018-01-09

## 2018-02-01 ENCOUNTER — OFFICE VISIT (OUTPATIENT)
Dept: PULMONOLOGY | Age: 48
End: 2018-02-01
Payer: MEDICAID

## 2018-02-01 VITALS
RESPIRATION RATE: 16 BRPM | WEIGHT: 293 LBS | HEIGHT: 66 IN | DIASTOLIC BLOOD PRESSURE: 89 MMHG | TEMPERATURE: 97 F | HEART RATE: 74 BPM | BODY MASS INDEX: 47.09 KG/M2 | OXYGEN SATURATION: 98 % | SYSTOLIC BLOOD PRESSURE: 139 MMHG

## 2018-02-01 DIAGNOSIS — J45.30 MILD PERSISTENT ASTHMA WITHOUT COMPLICATION: ICD-10-CM

## 2018-02-01 DIAGNOSIS — G47.33 OBSTRUCTIVE SLEEP APNEA: Primary | ICD-10-CM

## 2018-02-01 DIAGNOSIS — R06.00 DYSPNEA, UNSPECIFIED TYPE: ICD-10-CM

## 2018-02-01 PROCEDURE — G8427 DOCREV CUR MEDS BY ELIG CLIN: HCPCS | Performed by: INTERNAL MEDICINE

## 2018-02-01 PROCEDURE — G8484 FLU IMMUNIZE NO ADMIN: HCPCS | Performed by: INTERNAL MEDICINE

## 2018-02-01 PROCEDURE — 99214 OFFICE O/P EST MOD 30 MIN: CPT | Performed by: INTERNAL MEDICINE

## 2018-02-01 PROCEDURE — G8417 CALC BMI ABV UP PARAM F/U: HCPCS | Performed by: INTERNAL MEDICINE

## 2018-02-01 PROCEDURE — 1036F TOBACCO NON-USER: CPT | Performed by: INTERNAL MEDICINE

## 2018-02-01 RX ORDER — FLUTICASONE FUROATE AND VILANTEROL 100; 25 UG/1; UG/1
1 POWDER RESPIRATORY (INHALATION) DAILY
Qty: 1 EACH | Refills: 11 | Status: SHIPPED | OUTPATIENT
Start: 2018-02-01 | End: 2018-02-03 | Stop reason: ALTCHOICE

## 2018-02-01 NOTE — PATIENT INSTRUCTIONS
SURVEY:    You may be receiving a survey from [phone] regarding your visit today. Please complete the survey to enable us to provide the highest quality of care to you and your family. If you cannot score us a very good on any question, please call the office to discuss how we could of made your experience a very good one. Thank you.

## 2018-02-01 NOTE — PROGRESS NOTES
tablet Take 10 mEq by mouth daily  1    gabapentin (NEURONTIN) 100 MG capsule Take 1 capsule by mouth 3 times daily 90 capsule 5    Gabapentin, Once-Daily, 300 MG TABS Take 300 mg by mouth 2 times daily (Patient taking differently: Take 300 mg by mouth nightly .) 60 tablet 5    nabumetone (RELAFEN) 500 MG tablet Take 500 mg by mouth 2 times daily      Cholecalciferol (VITAMIN D PO) Take 1 tablet by mouth every morning       vitamin B-1 (THIAMINE) 100 MG tablet Take 100 mg by mouth daily      ferrous sulfate 325 (65 FE) MG tablet Take 325 mg by mouth daily (with breakfast)      Multiple Vitamin (MVI, BARIATRIC ADVANTAGE MULTI-FORMULA, CHEW TAB) Take 1 tablet by mouth 3 times daily BA caps with iron -3 caps daily      Calcium Citrate-Vitamin D (CALCIUM CITRATE + D PO) Take 500 mg by mouth 3 times daily      albuterol (PROVENTIL) (2.5 MG/3ML) 0.083% nebulizer solution INHALE CONTENTS OF 1 VIAL BY MOUTH VIA NEBULIZER EVERY 6 HOURS AS NEEDED 360 mL 11    spironolactone-hydrochlorothiazide (ALDACTAZIDE) 25-25 MG per tablet TAKE 1 TABLET BY MOUTH ONCE DAILY (Patient taking differently: TAKE 1 TABLET BY MOUTH TWICE DAILY) 30 tablet 3    silver sulfADIAZINE (SILVADENE) 1 % cream Apply  topically 2 times daily as needed. Apply topically daily. 100 g 1     No facility-administered encounter medications on file as of 2/1/2018. Objective:    Physical Exam:  Vitals: /89   Pulse 74   Temp 97 °F (36.1 °C)   Resp 16   Ht 5' 6\" (1.676 m)   Wt (!) 380 lb (172.4 kg)   SpO2 98%   BMI 61.33 kg/m²   Last 3 weights: Wt Readings from Last 3 Encounters:   02/01/18 (!) 380 lb (172.4 kg)   01/09/18 (!) 381 lb (172.8 kg)   01/03/18 (!) 381 lb (172.8 kg)     Body mass index is 61.33 kg/m².     Physical Examination:   General appearance - alert, well appearing, and in no distress, overweight and acyanotic, in no respiratory distress  Mental status - alert, oriented to person, place, and time  Eyes - pupils equal and reactive, extraocular eye movements intact, sclera anicteric  Ears - right ear normal, left ear normal  Nose - normal and patent, no erythema, discharge or polyps  Mouth - mucous membranes moist, pharynx normal without lesions  Neck - supple, no significant adenopathy  Chest - clear to auscultation, no wheezes, rales or rhonchi, symmetric air entry  Heart - normal rate, regular rhythm, normal S1, S2, no murmurs, rubs, clicks or gallops  Abdomen - soft, nontender, nondistended, no masses or organomegaly  Neurological - alert, oriented, normal speech, no focal findings or movement disorder noted}  Extremities - positive chronic edema/lymphedema and venous stasis noted, no ulcers, gangrene or atrophic changes  Skin - normal coloration and turgor, no rashes, no suspicious skin lesions noted     Labs:  None    CBC:   WBC   Date Value Ref Range Status   12/20/2017 6.3 3.5 - 11.0 k/uL Final     Hemoglobin   Date Value Ref Range Status   12/20/2017 11.7 (L) 12.0 - 16.0 g/dL Final     Platelet Count   Date Value Ref Range Status   05/31/2012 447 140 - 450 k/uL Final     Platelets   Date Value Ref Range Status   12/20/2017 314 140 - 450 k/uL Final     BMP:   Sodium   Date Value Ref Range Status   09/15/2017 135 135 - 144 mmol/L Final     Potassium   Date Value Ref Range Status   09/15/2017 3.7 3.7 - 5.3 mmol/L Final     Chloride   Date Value Ref Range Status   09/15/2017 96 (L) 98 - 107 mmol/L Final     CO2   Date Value Ref Range Status   09/15/2017 24 20 - 31 mmol/L Final     BUN   Date Value Ref Range Status   09/15/2017 18 6 - 20 mg/dL Final     CREATININE   Date Value Ref Range Status   09/15/2017 1.32 (H) 0.50 - 0.90 mg/dL Final   03/17/2017 1.30 (H) 0.50 - 0.90 mg/dL Final   12/21/2016 1.24 (H) 0.50 - 0.90 mg/dL Final     POC Creatinine   Date Value Ref Range Status   03/08/2017 1.5 (H) 0.6 - 1.4 mg/dL Final     Glucose   Date Value Ref Range Status   09/15/2017 106 (H) 70 - 99 mg/dL Final   05/31/2012 105 (H) 74 -

## 2018-02-02 ENCOUNTER — TELEPHONE (OUTPATIENT)
Dept: PULMONOLOGY | Age: 48
End: 2018-02-02

## 2018-02-20 RX ORDER — PANTOPRAZOLE SODIUM 40 MG/1
TABLET, DELAYED RELEASE ORAL
Qty: 30 TABLET | Refills: 3 | Status: SHIPPED | OUTPATIENT
Start: 2018-02-20 | End: 2018-06-18 | Stop reason: SDUPTHER

## 2018-03-06 ENCOUNTER — HOSPITAL ENCOUNTER (OUTPATIENT)
Age: 48
Discharge: HOME | End: 2018-03-06
Payer: MEDICAID

## 2018-03-06 DIAGNOSIS — Z98.84 S/P LAPAROSCOPIC SLEEVE GASTRECTOMY: ICD-10-CM

## 2018-03-06 DIAGNOSIS — G47.33 OSA ON CPAP: ICD-10-CM

## 2018-03-06 DIAGNOSIS — E66.01 MORBID OBESITY WITH BMI OF 60.0-69.9, ADULT (HCC): ICD-10-CM

## 2018-03-06 DIAGNOSIS — I89.0 LYMPHEDEMA: ICD-10-CM

## 2018-03-06 DIAGNOSIS — I10 HTN, GOAL BELOW 140/90: ICD-10-CM

## 2018-03-06 DIAGNOSIS — R73.03 PREDIABETES: ICD-10-CM

## 2018-03-06 DIAGNOSIS — J45.909 ASTHMA, CHRONIC, UNSPECIFIED ASTHMA SEVERITY, UNCOMPLICATED: ICD-10-CM

## 2018-03-06 DIAGNOSIS — D50.9 IRON DEFICIENCY ANEMIA, UNSPECIFIED IRON DEFICIENCY ANEMIA TYPE: ICD-10-CM

## 2018-03-06 DIAGNOSIS — E51.9 THIAMINE DEFICIENCY: ICD-10-CM

## 2018-03-06 DIAGNOSIS — E78.5 HYPERLIPIDEMIA, UNSPECIFIED HYPERLIPIDEMIA TYPE: ICD-10-CM

## 2018-03-06 DIAGNOSIS — Z99.89 OSA ON CPAP: ICD-10-CM

## 2018-03-06 LAB
ABSOLUTE EOS #: <0.03 K/UL (ref 0–0.44)
ABSOLUTE IMMATURE GRANULOCYTE: <0.03 K/UL (ref 0–0.3)
ABSOLUTE LYMPH #: 1.88 K/UL (ref 1.1–3.7)
ABSOLUTE MONO #: 0.46 K/UL (ref 0.1–1.2)
ALBUMIN SERPL-MCNC: 4 G/DL (ref 3.5–5.2)
ALBUMIN/GLOBULIN RATIO: 1 (ref 1–2.5)
ALP BLD-CCNC: 81 U/L (ref 35–104)
ALT SERPL-CCNC: 6 U/L (ref 5–33)
ANION GAP SERPL CALCULATED.3IONS-SCNC: 13 MMOL/L (ref 9–17)
AST SERPL-CCNC: 10 U/L
BASOPHILS # BLD: 1 % (ref 0–2)
BASOPHILS ABSOLUTE: 0.07 K/UL (ref 0–0.2)
BILIRUB SERPL-MCNC: 0.31 MG/DL (ref 0.3–1.2)
BUN BLDV-MCNC: 21 MG/DL (ref 6–20)
BUN/CREAT BLD: 15 (ref 9–20)
CALCIUM SERPL-MCNC: 9.6 MG/DL (ref 8.6–10.4)
CHLORIDE BLD-SCNC: 96 MMOL/L (ref 98–107)
CO2: 28 MMOL/L (ref 20–31)
CREAT SERPL-MCNC: 1.39 MG/DL (ref 0.5–0.9)
DIFFERENTIAL TYPE: ABNORMAL
EOSINOPHILS RELATIVE PERCENT: 0 % (ref 1–4)
ESTIMATED AVERAGE GLUCOSE: 100 MG/DL
FERRITIN: 54 UG/L (ref 13–150)
FOLATE: 12.4 NG/ML
GFR AFRICAN AMERICAN: 49 ML/MIN
GFR NON-AFRICAN AMERICAN: 41 ML/MIN
GFR SERPL CREATININE-BSD FRML MDRD: ABNORMAL ML/MIN/{1.73_M2}
GFR SERPL CREATININE-BSD FRML MDRD: ABNORMAL ML/MIN/{1.73_M2}
GGT: 20 U/L (ref 5–36)
GLUCOSE BLD-MCNC: 103 MG/DL (ref 70–99)
HBA1C MFR BLD: 5.1 % (ref 4.8–5.9)
HCT VFR BLD CALC: 39.7 % (ref 36.3–47.1)
HEMOGLOBIN: 11.8 G/DL (ref 11.9–15.1)
IMMATURE GRANULOCYTES: 0 %
IRON SATURATION: 9 % (ref 20–55)
IRON: 26 UG/DL (ref 37–145)
LYMPHOCYTES # BLD: 30 % (ref 24–43)
MAGNESIUM: 2.2 MG/DL (ref 1.6–2.6)
MAGNESIUM: 2.2 MG/DL (ref 1.6–2.6)
MCH RBC QN AUTO: 26 PG (ref 25.2–33.5)
MCHC RBC AUTO-ENTMCNC: 29.7 G/DL (ref 28.4–34.8)
MCV RBC AUTO: 87.6 FL (ref 82.6–102.9)
MONOCYTES # BLD: 7 % (ref 3–12)
NRBC AUTOMATED: 0 PER 100 WBC
PDW BLD-RTO: 15.3 % (ref 11.8–14.4)
PLATELET # BLD: 269 K/UL (ref 138–453)
PLATELET ESTIMATE: ABNORMAL
PMV BLD AUTO: 11.5 FL (ref 8.1–13.5)
POTASSIUM SERPL-SCNC: 3.9 MMOL/L (ref 3.7–5.3)
PTH INTACT: 103.1 PG/ML (ref 15–65)
RBC # BLD: 4.53 M/UL (ref 3.95–5.11)
RBC # BLD: ABNORMAL 10*6/UL
SEG NEUTROPHILS: 61 % (ref 36–65)
SEGMENTED NEUTROPHILS ABSOLUTE COUNT: 3.79 K/UL (ref 1.5–8.1)
SODIUM BLD-SCNC: 137 MMOL/L (ref 135–144)
THYROXINE, FREE: 1.3 NG/DL (ref 0.93–1.7)
TOTAL IRON BINDING CAPACITY: 296 UG/DL (ref 250–450)
TOTAL PROTEIN: 8.2 G/DL (ref 6.4–8.3)
TSH SERPL DL<=0.05 MIU/L-ACNC: 1.58 MIU/L (ref 0.3–5)
UNSATURATED IRON BINDING CAPACITY: 269.6 UG/DL (ref 112–347)
VITAMIN B-12: 575 PG/ML (ref 211–946)
VITAMIN D 25-HYDROXY: 39.7 NG/ML (ref 30–100)
WBC # BLD: 5.9 K/UL (ref 3.5–11.3)
WBC # BLD: ABNORMAL 10*3/UL

## 2018-03-06 PROCEDURE — 84425 ASSAY OF VITAMIN B-1: CPT

## 2018-03-06 PROCEDURE — 36415 COLL VENOUS BLD VENIPUNCTURE: CPT

## 2018-03-06 PROCEDURE — 82607 VITAMIN B-12: CPT

## 2018-03-06 PROCEDURE — 83735 ASSAY OF MAGNESIUM: CPT

## 2018-03-06 PROCEDURE — 80053 COMPREHEN METABOLIC PANEL: CPT

## 2018-03-06 PROCEDURE — 84630 ASSAY OF ZINC: CPT

## 2018-03-06 PROCEDURE — 82525 ASSAY OF COPPER: CPT

## 2018-03-06 PROCEDURE — 83550 IRON BINDING TEST: CPT

## 2018-03-06 PROCEDURE — 84439 ASSAY OF FREE THYROXINE: CPT

## 2018-03-06 PROCEDURE — 83516 IMMUNOASSAY NONANTIBODY: CPT

## 2018-03-06 PROCEDURE — 82728 ASSAY OF FERRITIN: CPT

## 2018-03-06 PROCEDURE — 82746 ASSAY OF FOLIC ACID SERUM: CPT

## 2018-03-06 PROCEDURE — 82977 ASSAY OF GGT: CPT

## 2018-03-06 PROCEDURE — 83036 HEMOGLOBIN GLYCOSYLATED A1C: CPT

## 2018-03-06 PROCEDURE — 84590 ASSAY OF VITAMIN A: CPT

## 2018-03-06 PROCEDURE — 83540 ASSAY OF IRON: CPT

## 2018-03-06 PROCEDURE — 82306 VITAMIN D 25 HYDROXY: CPT

## 2018-03-06 PROCEDURE — 83970 ASSAY OF PARATHORMONE: CPT

## 2018-03-06 PROCEDURE — 84443 ASSAY THYROID STIM HORMONE: CPT

## 2018-03-06 PROCEDURE — 85025 COMPLETE CBC W/AUTO DIFF WBC: CPT

## 2018-03-07 LAB — TISSUE TRANSGLUTAMINASE IGA: 0.2 U/ML

## 2018-03-08 DIAGNOSIS — E21.3 HYPERPARATHYROIDISM (HCC): ICD-10-CM

## 2018-03-08 LAB
COPPER: 193 UG/DL (ref 80–155)
ZINC: 72 UG/DL (ref 60–120)

## 2018-03-09 LAB
RETINYL PALMITATE: <0.02 MG/L (ref 0–0.1)
VITAMIN A LEVEL: 0.68 MG/L (ref 0.3–1.2)
VITAMIN A, INTERP: NORMAL

## 2018-03-13 LAB — VITAMIN B1 WHOLE BLOOD: 57 NMOL/L (ref 70–180)

## 2018-03-21 ENCOUNTER — HOSPITAL ENCOUNTER (OUTPATIENT)
Age: 48
Discharge: HOME | End: 2018-03-21
Payer: MEDICAID

## 2018-03-21 LAB
CHOLESTEROL/HDL RATIO: 3.7
CHOLESTEROL: 241 MG/DL
HDLC SERPL-MCNC: 66 MG/DL
LDL CHOLESTEROL: 159 MG/DL (ref 0–130)
TRIGL SERPL-MCNC: 79 MG/DL
VLDLC SERPL CALC-MCNC: ABNORMAL MG/DL (ref 1–30)

## 2018-03-21 PROCEDURE — 36415 COLL VENOUS BLD VENIPUNCTURE: CPT

## 2018-03-21 PROCEDURE — 80061 LIPID PANEL: CPT

## 2018-03-26 ENCOUNTER — TELEPHONE (OUTPATIENT)
Dept: BARIATRICS/WEIGHT MGMT | Age: 48
End: 2018-03-26

## 2018-03-26 NOTE — TELEPHONE ENCOUNTER
patient called to confirm upcoming appt. Stated that the endo she was referred to can not see her until Aug. She is wanting to know if this is okay or if she should be referred to a different endo .

## 2018-04-03 ENCOUNTER — OFFICE VISIT (OUTPATIENT)
Dept: BARIATRICS/WEIGHT MGMT | Age: 48
End: 2018-04-03
Payer: MEDICAID

## 2018-04-03 VITALS
HEIGHT: 66 IN | DIASTOLIC BLOOD PRESSURE: 70 MMHG | RESPIRATION RATE: 20 BRPM | HEART RATE: 72 BPM | BODY MASS INDEX: 47.09 KG/M2 | SYSTOLIC BLOOD PRESSURE: 110 MMHG | WEIGHT: 293 LBS

## 2018-04-03 DIAGNOSIS — R10.12 LUQ ABDOMINAL PAIN: ICD-10-CM

## 2018-04-03 DIAGNOSIS — R79.89 ELEVATED PTHRP LEVEL: Primary | ICD-10-CM

## 2018-04-03 DIAGNOSIS — G47.33 OSA ON CPAP: ICD-10-CM

## 2018-04-03 DIAGNOSIS — I89.0 LYMPHEDEMA: ICD-10-CM

## 2018-04-03 DIAGNOSIS — E66.01 MORBID OBESITY WITH BMI OF 60.0-69.9, ADULT (HCC): ICD-10-CM

## 2018-04-03 DIAGNOSIS — Z99.89 OSA ON CPAP: ICD-10-CM

## 2018-04-03 DIAGNOSIS — R73.03 PREDIABETES: ICD-10-CM

## 2018-04-03 PROCEDURE — 99213 OFFICE O/P EST LOW 20 MIN: CPT | Performed by: SURGERY

## 2018-04-03 PROCEDURE — 1036F TOBACCO NON-USER: CPT | Performed by: SURGERY

## 2018-04-03 PROCEDURE — G8427 DOCREV CUR MEDS BY ELIG CLIN: HCPCS | Performed by: SURGERY

## 2018-04-03 PROCEDURE — G8417 CALC BMI ABV UP PARAM F/U: HCPCS | Performed by: SURGERY

## 2018-05-22 ENCOUNTER — HOSPITAL ENCOUNTER (OUTPATIENT)
Age: 48
Discharge: HOME | End: 2018-05-22
Payer: MEDICAID

## 2018-05-22 LAB — PTH INTACT: 89.47 PG/ML (ref 15–65)

## 2018-05-22 PROCEDURE — 83970 ASSAY OF PARATHORMONE: CPT

## 2018-05-22 PROCEDURE — 36415 COLL VENOUS BLD VENIPUNCTURE: CPT

## 2018-06-19 RX ORDER — PANTOPRAZOLE SODIUM 40 MG/1
TABLET, DELAYED RELEASE ORAL
Qty: 30 TABLET | Refills: 3 | Status: SHIPPED | OUTPATIENT
Start: 2018-06-19 | End: 2018-09-26 | Stop reason: SDUPTHER

## 2018-07-23 RX ORDER — MONTELUKAST SODIUM 10 MG/1
TABLET ORAL
Qty: 30 TABLET | Refills: 11 | Status: SHIPPED | OUTPATIENT
Start: 2018-07-23 | End: 2022-04-20 | Stop reason: SDUPTHER
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
## 2018-09-26 ENCOUNTER — INITIAL CONSULT (OUTPATIENT)
Dept: ENDOCRINOLOGY | Age: 48
End: 2018-09-26
Payer: MEDICAID

## 2018-09-26 VITALS
WEIGHT: 293 LBS | DIASTOLIC BLOOD PRESSURE: 88 MMHG | TEMPERATURE: 98.5 F | HEIGHT: 66 IN | OXYGEN SATURATION: 98 % | BODY MASS INDEX: 47.09 KG/M2 | HEART RATE: 68 BPM | SYSTOLIC BLOOD PRESSURE: 124 MMHG

## 2018-09-26 DIAGNOSIS — E34.9 ELEVATED PARATHYROID HORMONE: Primary | ICD-10-CM

## 2018-09-26 DIAGNOSIS — E04.9 GOITER, EUTHYROID: ICD-10-CM

## 2018-09-26 DIAGNOSIS — E78.00 HYPERCHOLESTEROLEMIA: ICD-10-CM

## 2018-09-26 DIAGNOSIS — E66.01 MORBID OBESITY (HCC): ICD-10-CM

## 2018-09-26 DIAGNOSIS — I89.0 LYMPHEDEMA: ICD-10-CM

## 2018-09-26 DIAGNOSIS — Z98.84 STATUS POST BARIATRIC SURGERY: ICD-10-CM

## 2018-09-26 PROCEDURE — G8417 CALC BMI ABV UP PARAM F/U: HCPCS | Performed by: INTERNAL MEDICINE

## 2018-09-26 PROCEDURE — 99244 OFF/OP CNSLTJ NEW/EST MOD 40: CPT | Performed by: INTERNAL MEDICINE

## 2018-09-26 PROCEDURE — G8427 DOCREV CUR MEDS BY ELIG CLIN: HCPCS | Performed by: INTERNAL MEDICINE

## 2018-09-26 RX ORDER — ATORVASTATIN CALCIUM 20 MG/1
20 TABLET, FILM COATED ORAL DAILY
Qty: 30 TABLET | Refills: 1 | Status: SHIPPED | OUTPATIENT
Start: 2018-09-26

## 2018-09-26 NOTE — PROGRESS NOTES
Chronic Disease Visit Information    BP Readings from Last 3 Encounters:   04/03/18 110/70   02/01/18 139/89   01/09/18 (!) 141/88          Hemoglobin A1C (%)   Date Value   03/06/2018 5.1   03/17/2017 6.0 (H)   12/21/2015 5.6     LDL Cholesterol (mg/dL)   Date Value   03/21/2018 159 (H)     HDL (mg/dL)   Date Value   03/21/2018 66     BUN (mg/dL)   Date Value   03/06/2018 21 (H)     CREATININE (mg/dL)   Date Value   03/06/2018 1.39 (H)     Glucose (mg/dL)   Date Value   03/06/2018 103 (H)   05/31/2012 105 (H)            Have you changed or started any medications since your last visit including any over-the-counter medicines, vitamins, or herbal medicines? no   Are you having any side effects from any of your medications? -  no  Have you stopped taking any of your medications? Is so, why? -  no    Have you seen any other physician or provider since your last visit? No  Have you had any other diagnostic tests since your last visit? No  Have you been seen in the emergency room and/or had an admission to a hospital since we last saw you? No  Have you had your annual diabetic retinal (eye) exam? No  Have you had your routine dental cleaning in the past 6 months? no    Have you activated your [employer] account? If not, what are your barriers?  Yes     Patient Care Team:  Josh Vera MD as PCP - General (Family Medicine)         Medical History Review  Past Medical, Family, and Social History reviewed and does contribute to the patient presenting condition    Health Maintenance   Topic Date Due    HIV screen  08/26/1986    DTaP/Tdap/Td vaccine (1 - Tdap) 08/26/1990    Pneumococcal med risk (1 of 1 - PPSV23) 08/26/1990    Cervical cancer screen  08/26/1992    Flu vaccine (1) 09/01/2018    A1C test (Diabetic or Prediabetic)  03/06/2019    Lipid screen  03/21/2023

## 2018-09-27 ENCOUNTER — HOSPITAL ENCOUNTER (OUTPATIENT)
Age: 48
Setting detail: SPECIMEN
Discharge: HOME | End: 2018-09-27
Payer: MEDICAID

## 2018-09-27 ENCOUNTER — HOSPITAL ENCOUNTER (OUTPATIENT)
Age: 48
Discharge: HOME | End: 2018-09-27
Payer: MEDICAID

## 2018-09-27 DIAGNOSIS — E34.9 ELEVATED PARATHYROID HORMONE: ICD-10-CM

## 2018-09-27 DIAGNOSIS — E04.9 GOITER, EUTHYROID: ICD-10-CM

## 2018-09-27 LAB — PHOSPHORUS: 2.8 MG/DL (ref 2.6–4.5)

## 2018-09-27 PROCEDURE — 82340 ASSAY OF CALCIUM IN URINE: CPT

## 2018-09-27 PROCEDURE — 84100 ASSAY OF PHOSPHORUS: CPT

## 2018-09-27 PROCEDURE — 81050 URINALYSIS VOLUME MEASURE: CPT

## 2018-09-27 PROCEDURE — 86800 THYROGLOBULIN ANTIBODY: CPT

## 2018-09-27 PROCEDURE — 82570 ASSAY OF URINE CREATININE: CPT

## 2018-09-27 PROCEDURE — 36415 COLL VENOUS BLD VENIPUNCTURE: CPT

## 2018-09-27 PROCEDURE — 86376 MICROSOMAL ANTIBODY EACH: CPT

## 2018-09-28 DIAGNOSIS — E34.9 ELEVATED PARATHYROID HORMONE: ICD-10-CM

## 2018-09-28 LAB
CALCIUM TIMED UR: NORMAL MG/X H
CALCIUM URINE: 3.3 MG/DL
CALCIUM, URINE: 92 MG/24 H (ref 20–275)
CREATININE TIMED UR: ABNORMAL MG/ X H
CREATININE URINE: 57.5 MG/DL
CREATININE, 24H UR: 1610 MG/24 H (ref 740–1570)
HOURS COLLECTED: 24 H
HOURS COLLECTED: 24 H
THYROGLOBULIN AB: <20 IU/ML (ref 0–40)
THYROID PEROXIDASE (TPO) AB: <10 IU/ML (ref 0–35)
VOLUME: 2800 ML
VOLUME: 2800 ML

## 2018-11-26 ENCOUNTER — HOSPITAL ENCOUNTER (EMERGENCY)
Age: 48
Discharge: HOME | End: 2018-11-27
Attending: EMERGENCY MEDICINE
Payer: MEDICAID

## 2018-11-26 DIAGNOSIS — L50.9 URTICARIA: Primary | ICD-10-CM

## 2018-11-26 PROCEDURE — 99282 EMERGENCY DEPT VISIT SF MDM: CPT

## 2018-11-26 PROCEDURE — 6370000000 HC RX 637 (ALT 250 FOR IP): Performed by: EMERGENCY MEDICINE

## 2018-11-26 PROCEDURE — 96372 THER/PROPH/DIAG INJ SC/IM: CPT

## 2018-11-26 PROCEDURE — 6360000002 HC RX W HCPCS: Performed by: EMERGENCY MEDICINE

## 2018-11-26 RX ORDER — PREDNISONE 20 MG/1
20 TABLET ORAL 3 TIMES DAILY
Qty: 15 TABLET | Refills: 0 | Status: SHIPPED | OUTPATIENT
Start: 2018-11-26 | End: 2018-12-12 | Stop reason: ALTCHOICE

## 2018-11-26 RX ORDER — PREDNISONE 20 MG/1
60 TABLET ORAL ONCE
Status: COMPLETED | OUTPATIENT
Start: 2018-11-27 | End: 2018-11-26

## 2018-11-26 RX ORDER — EPINEPHRINE 1 MG/ML
0.3 INJECTION, SOLUTION, CONCENTRATE INTRAVENOUS ONCE
Status: COMPLETED | OUTPATIENT
Start: 2018-11-27 | End: 2018-11-26

## 2018-11-26 RX ORDER — HYDROXYZINE PAMOATE 50 MG/1
50 CAPSULE ORAL 3 TIMES DAILY PRN
Status: DISCONTINUED | OUTPATIENT
Start: 2018-11-26 | End: 2018-11-27 | Stop reason: HOSPADM

## 2018-11-26 RX ADMIN — EPINEPHRINE 0.3 MG: 1 INJECTION, SOLUTION, CONCENTRATE INTRAVENOUS at 23:52

## 2018-11-26 RX ADMIN — PREDNISONE 60 MG: 20 TABLET ORAL at 23:53

## 2018-11-27 VITALS
RESPIRATION RATE: 16 BRPM | OXYGEN SATURATION: 99 % | DIASTOLIC BLOOD PRESSURE: 90 MMHG | SYSTOLIC BLOOD PRESSURE: 158 MMHG | TEMPERATURE: 98 F | HEART RATE: 76 BPM

## 2018-11-27 PROCEDURE — 6370000000 HC RX 637 (ALT 250 FOR IP): Performed by: EMERGENCY MEDICINE

## 2018-11-27 RX ADMIN — HYDROXYZINE PAMOATE 50 MG: 50 CAPSULE ORAL at 00:19

## 2018-11-27 NOTE — ED PROVIDER NOTES
HPI  Hives mostly face and upper body for last 2 days not getting better itching call over. Denies any new medication chemical exposure  Review of Systems  No fevers no chills no plan no throat swelling no cough no shortness of breath no chest pain abdominal pain all other systems negative  Physical Exam   Severely morbidly obese female in no distress no throat clear. Neck supple no lymphadenopathy. Lungs clear to auscultation bilaterally. Heart regular rate and rhythm. Abdomen obese soft nontender. Hives limited to areas face and upper body    Procedures    MDM   urticaria      Good condition if not better in few days needs allergy physician evaluation    Labs      Radiology      EKG Interpretation. Summation      Patient Course: Zyrtec 10 mg twice a day for week over-the-counter    ED Medications administered this visit:    Medications   EPINEPHrine PF 1 MG/ML injection 0.3 mg (not administered)   predniSONE (DELTASONE) tablet 60 mg (not administered)   hydrOXYzine (VISTARIL) capsule 50 mg (not administered)       New Prescriptions from this visit:    New Prescriptions    PREDNISONE (DELTASONE) 20 MG TABLET    Take 1 tablet by mouth 3 times daily       Follow-up:  Samantha Ugalde MD  [address] [facility]  [phone]    In 2 days          Final Impression:   1.  Urticaria               (Please note that portions of this note were completed with a voice recognition program.  Efforts were made to edit the dictations but occasionally words are mis-transcribed.)       Harry Robertson MD  11/26/18 2574

## 2018-11-28 ENCOUNTER — OFFICE VISIT (OUTPATIENT)
Dept: ENDOCRINOLOGY | Age: 48
End: 2018-11-28
Payer: MEDICAID

## 2018-11-28 VITALS
SYSTOLIC BLOOD PRESSURE: 116 MMHG | DIASTOLIC BLOOD PRESSURE: 80 MMHG | OXYGEN SATURATION: 94 % | TEMPERATURE: 98.2 F | HEIGHT: 66 IN | HEART RATE: 57 BPM | BODY MASS INDEX: 47.09 KG/M2 | WEIGHT: 293 LBS

## 2018-11-28 DIAGNOSIS — Z98.84 STATUS POST BARIATRIC SURGERY: ICD-10-CM

## 2018-11-28 DIAGNOSIS — E04.9 GOITER, EUTHYROID: ICD-10-CM

## 2018-11-28 DIAGNOSIS — E78.00 HYPERCHOLESTEROLEMIA: ICD-10-CM

## 2018-11-28 DIAGNOSIS — E66.01 MORBID OBESITY (HCC): ICD-10-CM

## 2018-11-28 DIAGNOSIS — I89.0 LYMPHEDEMA: ICD-10-CM

## 2018-11-28 DIAGNOSIS — E34.9 ELEVATED PARATHYROID HORMONE: Primary | ICD-10-CM

## 2018-11-28 PROCEDURE — G8484 FLU IMMUNIZE NO ADMIN: HCPCS | Performed by: INTERNAL MEDICINE

## 2018-11-28 PROCEDURE — 1036F TOBACCO NON-USER: CPT | Performed by: INTERNAL MEDICINE

## 2018-11-28 PROCEDURE — G8427 DOCREV CUR MEDS BY ELIG CLIN: HCPCS | Performed by: INTERNAL MEDICINE

## 2018-11-28 PROCEDURE — 99214 OFFICE O/P EST MOD 30 MIN: CPT | Performed by: INTERNAL MEDICINE

## 2018-11-28 PROCEDURE — G8417 CALC BMI ABV UP PARAM F/U: HCPCS | Performed by: INTERNAL MEDICINE

## 2018-11-28 NOTE — PROGRESS NOTES
Chronic Disease Visit Information    BP Readings from Last 3 Encounters:   11/27/18 (!) 158/90   09/26/18 124/88   04/03/18 110/70          Hemoglobin A1C (%)   Date Value   03/06/2018 5.1   03/17/2017 6.0 (H)   12/21/2015 5.6     LDL Cholesterol (mg/dL)   Date Value   03/21/2018 159 (H)     HDL (mg/dL)   Date Value   03/21/2018 66     BUN (mg/dL)   Date Value   03/06/2018 21 (H)     CREATININE (mg/dL)   Date Value   03/06/2018 1.39 (H)     Glucose (mg/dL)   Date Value   03/06/2018 103 (H)   05/31/2012 105 (H)            Have you changed or started any medications since your last visit including any over-the-counter medicines, vitamins, or herbal medicines? no   Are you having any side effects from any of your medications? -  no  Have you stopped taking any of your medications? Is so, why? -  no    Have you seen any other physician or provider since your last visit? No  Have you had any other diagnostic tests since your last visit? Yes - Records Obtained  Have you been seen in the emergency room and/or had an admission to a hospital since we last saw you? Yes - Records Obtained  Have you had your annual diabetic retinal (eye) exam? No  Have you had your routine dental cleaning in the past 6 months? no    Have you activated your [employer] account? If not, what are your barriers?  Yes     Patient Care Team:  Luann Christianson MD as PCP - General (Family Medicine)         Medical History Review  Past Medical, Family, and Social History reviewed and does contribute to the patient presenting condition    Health Maintenance   Topic Date Due    HIV screen  08/26/1986    DTaP/Tdap/Td vaccine (1 - Tdap) 08/26/1990    Pneumococcal med risk (1 of 1 - PPSV23) 08/26/1990    Cervical cancer screen  08/26/1992    Flu vaccine (1) 09/01/2018    A1C test (Diabetic or Prediabetic)  03/06/2019    Lipid screen  03/21/2023
LONGER ON MED    Sleep apnea 2010    USES CPAP        Past Surgical History:   Procedure Laterality Date    CARDIOVASCULAR STRESS TEST  08/30/2016    CLOSED REDUCTION OF A JOINT Right 09/23/2016    shoulder    MOUTH SURGERY  2005    1 UPPER MOLAR REMOVED    SHOULDER ARTHROSCOPY Right 03/08/2017    with Bicep tenotomy    SHOULDER ARTHROSCOPY Right 3/8/2017    SHOULDER ARTHROSCOPY performed by Melyssa Gacria DO at [address]  09/19/2016    robotic sleeve gastrectomy, with robotic enmanuel, right wrist ganglion    TONSILLECTOMY  1978    UPPER GASTROINTESTINAL ENDOSCOPY  2016    UPPER GASTROINTESTINAL ENDOSCOPY  12-9-15    WISDOM TOOTH EXTRACTION  2005    2 TEETH REMOVED    WISDOM TOOTH EXTRACTION  02/2015    2 BOTTOM WISDOM TEETH REMOVED 2 TOP MOLARS REMOVED     Family History   Problem Relation Age of Onset    Cancer Mother         uterine    Heart Disease Mother     High Blood Pressure Mother     Stroke Mother     Arthritis Mother     Depression Mother     Stroke Father     High Blood Pressure Father     Diabetes Father     High Cholesterol Father     Sickle Cell Anemia Maternal Aunt         aunt has the trait    Kidney Disease Sister         KIDNEY FAILURE    Kidney Disease Sister         SIDS-2 MONTHS OLD     Social History   Substance Use Topics    Smoking status: Never Smoker    Smokeless tobacco: Never Used    Alcohol use No        Current Outpatient Prescriptions   Medication Sig Dispense Refill    predniSONE (DELTASONE) 20 MG tablet Take 1 tablet by mouth 3 times daily 15 tablet 0    atorvastatin (LIPITOR) 20 MG tablet Take 1 tablet by mouth daily 30 tablet 1    montelukast (SINGULAIR) 10 MG tablet TAKE 1 TABLET BY MOUTH NIGHTLY 30 tablet 11    gabapentin (NEURONTIN) 100 MG capsule take 3 capsules by mouth at bedtime.  90 capsule 2    mometasone (ASMANEX) 100 MCG/ACT AERO inhaler Inhale 2 puffs into the lungs 2 times daily 1 Inhaler 5    tiZANidine (ZANAFLEX) 4

## 2018-12-12 ENCOUNTER — OFFICE VISIT (OUTPATIENT)
Dept: BARIATRICS/WEIGHT MGMT | Age: 48
End: 2018-12-12
Payer: MEDICAID

## 2018-12-12 VITALS
BODY MASS INDEX: 47.09 KG/M2 | SYSTOLIC BLOOD PRESSURE: 143 MMHG | RESPIRATION RATE: 20 BRPM | DIASTOLIC BLOOD PRESSURE: 77 MMHG | HEART RATE: 68 BPM | WEIGHT: 293 LBS | HEIGHT: 66 IN

## 2018-12-12 DIAGNOSIS — K21.9 GASTROESOPHAGEAL REFLUX DISEASE WITHOUT ESOPHAGITIS: Primary | ICD-10-CM

## 2018-12-12 DIAGNOSIS — E66.01 MORBID OBESITY (HCC): ICD-10-CM

## 2018-12-12 PROCEDURE — G8417 CALC BMI ABV UP PARAM F/U: HCPCS | Performed by: SURGERY

## 2018-12-12 PROCEDURE — G8427 DOCREV CUR MEDS BY ELIG CLIN: HCPCS | Performed by: SURGERY

## 2018-12-12 PROCEDURE — 1036F TOBACCO NON-USER: CPT | Performed by: SURGERY

## 2018-12-12 PROCEDURE — 99213 OFFICE O/P EST LOW 20 MIN: CPT | Performed by: SURGERY

## 2018-12-12 PROCEDURE — G8484 FLU IMMUNIZE NO ADMIN: HCPCS | Performed by: SURGERY

## 2018-12-12 RX ORDER — EPINEPHRINE 0.3 MG/.3ML
0.3 INJECTION SUBCUTANEOUS PRN
COMMUNITY

## 2018-12-14 ENCOUNTER — APPOINTMENT (OUTPATIENT)
Dept: GENERAL RADIOLOGY | Age: 48
End: 2018-12-14
Payer: MEDICAID

## 2018-12-14 ENCOUNTER — HOSPITAL ENCOUNTER (EMERGENCY)
Age: 48
Discharge: HOME | End: 2018-12-14
Attending: EMERGENCY MEDICINE
Payer: MEDICAID

## 2018-12-14 VITALS
DIASTOLIC BLOOD PRESSURE: 87 MMHG | OXYGEN SATURATION: 97 % | RESPIRATION RATE: 18 BRPM | SYSTOLIC BLOOD PRESSURE: 158 MMHG | HEART RATE: 84 BPM | TEMPERATURE: 97.6 F

## 2018-12-14 DIAGNOSIS — S93.601A SPRAIN OF RIGHT FOOT, INITIAL ENCOUNTER: Primary | ICD-10-CM

## 2018-12-14 DIAGNOSIS — M79.671 RIGHT FOOT PAIN: ICD-10-CM

## 2018-12-14 PROCEDURE — 99284 EMERGENCY DEPT VISIT MOD MDM: CPT

## 2018-12-14 PROCEDURE — 6360000002 HC RX W HCPCS: Performed by: EMERGENCY MEDICINE

## 2018-12-14 PROCEDURE — 73590 X-RAY EXAM OF LOWER LEG: CPT

## 2018-12-14 PROCEDURE — 96372 THER/PROPH/DIAG INJ SC/IM: CPT

## 2018-12-14 PROCEDURE — 6370000000 HC RX 637 (ALT 250 FOR IP): Performed by: EMERGENCY MEDICINE

## 2018-12-14 PROCEDURE — 73610 X-RAY EXAM OF ANKLE: CPT

## 2018-12-14 RX ORDER — HYDROCODONE BITARTRATE AND ACETAMINOPHEN 5; 325 MG/1; MG/1
1 TABLET ORAL EVERY 4 HOURS PRN
Qty: 10 TABLET | Refills: 0 | Status: SHIPPED | OUTPATIENT
Start: 2018-12-14 | End: 2018-12-17

## 2018-12-14 RX ORDER — HYDROCODONE BITARTRATE AND ACETAMINOPHEN 5; 325 MG/1; MG/1
1 TABLET ORAL ONCE
Status: COMPLETED | OUTPATIENT
Start: 2018-12-14 | End: 2018-12-14

## 2018-12-14 RX ORDER — KETOROLAC TROMETHAMINE 30 MG/ML
30 INJECTION, SOLUTION INTRAMUSCULAR; INTRAVENOUS ONCE
Status: COMPLETED | OUTPATIENT
Start: 2018-12-14 | End: 2018-12-14

## 2018-12-14 RX ADMIN — KETOROLAC TROMETHAMINE 30 MG: 30 INJECTION, SOLUTION INTRAMUSCULAR at 20:44

## 2018-12-14 RX ADMIN — HYDROCODONE BITARTRATE AND ACETAMINOPHEN 1 TABLET: 5; 325 TABLET ORAL at 20:44

## 2018-12-14 ASSESSMENT — PAIN DESCRIPTION - DESCRIPTORS: DESCRIPTORS: ACHING;THROBBING

## 2018-12-14 ASSESSMENT — PAIN DESCRIPTION - PAIN TYPE: TYPE: ACUTE PAIN

## 2018-12-14 ASSESSMENT — PAIN DESCRIPTION - LOCATION: LOCATION: ANKLE

## 2018-12-14 ASSESSMENT — PAIN SCALES - GENERAL: PAINLEVEL_OUTOF10: 10

## 2018-12-14 ASSESSMENT — PAIN DESCRIPTION - ORIENTATION: ORIENTATION: RIGHT

## 2018-12-15 NOTE — ED PROVIDER NOTES
2006-NO LONGER ON MED    Sleep apnea 2010    USES CPAP       SURGICAL HISTORY       Past Surgical History:   Procedure Laterality Date    CARDIOVASCULAR STRESS TEST  08/30/2016    CLOSED REDUCTION OF A JOINT Right 09/23/2016    shoulder    MOUTH SURGERY  2005    1 UPPER MOLAR REMOVED    SHOULDER ARTHROSCOPY Right 03/08/2017    with Bicep tenotomy    SHOULDER ARTHROSCOPY Right 3/8/2017    SHOULDER ARTHROSCOPY performed by Jamila Bowman DO at [address]  09/19/2016    robotic sleeve gastrectomy, with robotic enmanuel, right wrist ganglion    TONSILLECTOMY  1978    UPPER GASTROINTESTINAL ENDOSCOPY  2016    UPPER GASTROINTESTINAL ENDOSCOPY  12-9-15    WISDOM TOOTH EXTRACTION  2005    2 TEETH REMOVED    WISDOM TOOTH EXTRACTION  02/2015    2 BOTTOM WISDOM TEETH REMOVED 2 TOP MOLARS REMOVED       CURRENT MEDICATIONS       Discharge Medication List as of 12/14/2018  9:58 PM      CONTINUE these medications which have NOT CHANGED    Details   montelukast (SINGULAIR) 10 MG tablet TAKE 1 TABLET BY MOUTH NIGHTLY, Disp-30 tablet, R-11Normal      gabapentin (NEURONTIN) 100 MG capsule take 3 capsules by mouth at bedtime. , Disp-90 capsule, R-2Normal      mometasone (ASMANEX) 100 MCG/ACT AERO inhaler Inhale 2 puffs into the lungs 2 times daily, Inhalation, 2 TIMES DAILY Starting Sat 2/3/2018, Disp-1 Inhaler, R-5, Normal      tiZANidine (ZANAFLEX) 4 MG tablet Take 4 mg by mouth as needed, R-0Historical Med      Dietary Management Product (VASCULERA) TABS Take 630 mg by mouth dailyHistorical Med      metoclopramide (REGLAN) 5 MG tablet Take 1 tablet by mouth 3 times daily, Disp-120 tablet, R-3Normal      pantoprazole (PROTONIX) 40 MG tablet take 1 tablet by mouth once daily, Disp-30 tablet, R-3Normal      potassium chloride (KLOR-CON) 10 MEQ extended release tablet Take 10 mEq by mouth daily, R-1Historical Med      nabumetone (RELAFEN) 500 MG tablet Take 500 mg by mouth 2 times dailyHistorical Med the knee and ankle joints. Soft tissue calcifications distal right lower leg typical sequela from chronic venous stasis. Visualized portions of the right tibia and fibula appear intact, articulating normally at the right ankle joint. Dome of the talus and tibial plafond appear unremarkable. Ankle mortise appears normally aligned. No retained radiopaque foreign body. Soft tissue calcifications distal right lower leg typical sequela from chronic venous stasis. Surrounding soft tissues appear otherwise unremarkable. No acute osseous abnormality evident right lower leg or ankle. Follow up imaging is recommended if pain persists or worsens. ED BEDSIDE ULTRASOUND:   Performed by ED Physician - none    LABS:  Labs Reviewed - No data to display     No results found for this visit on 12/14/18. All other labs were within normal range or not returned as of this dictation. EMERGENCY DEPARTMENT COURSE andDIFFERENTIAL DIAGNOSIS/MDM:   Vitals:    Vitals:    12/14/18 1913 12/14/18 1915   BP:  (!) 158/87   Pulse: 84    Resp: 18    Temp: 97.6 °F (36.4 °C)    TempSrc: Tympanic    SpO2: 97%        ED Course as of Dec 14 2227   Fri Dec 14, 2018   2156 Patient was too large to fit in the CT scanner. I advised the patient to use her walker. We'll give half dose of Norco for pain. Advised close follow-up nonweightbearing  [BD]   2156 She has a walker in the room  [BD]   2159 Patient has a walker  [BD]      ED Course User Index  [BD] Organ Gabriel, DO     Medications   ketorolac (TORADOL) injection 30 mg (30 mg Intramuscular Given 12/14/18 2044)   HYDROcodone-acetaminophen (NORCO) 5-325 MG per tablet 1 tablet (1 tablet Oral Given 12/14/18 2044)       MDM. Patient was also advised that not all fractures may be identified on initial radiology and that repeat x-ray and/or imaging may be indicated in 7-10 days.  Patient was advised to follow up with primary care provider or orthopedics in this timeframe for continuation

## 2018-12-15 NOTE — ED NOTES
Patient expressed concerns of being able to get around by herself at home, and requested to speak with social work. Will call SW and leave message with social work department to follow up with pt.         Neeru Lake RN  12/14/18 1628

## 2018-12-16 NOTE — PROGRESS NOTES
Being treated for depression  [] Other:    Skin Negative for: [x] Wound:   [] Open   [] Draining   [] Incisional     [x] Rash   [] Hair Loss  [] Other:     Positive for: [] Wound:   [] Open   [] Draining    [] Incisional  [] Rash   [] Hair Loss  [] Other:      Physical Exam:  BP (!) 143/77 (Site: Left Upper Arm, Position: Sitting, Cuff Size: Large Adult)   Pulse 68   Resp 20   Ht 5' 6\" (1.676 m)   Wt (!) 401 lb (181.9 kg)   LMP 11/26/2018   BMI 64.72 kg/m²   Constitutional:  Vital signs are normal. The patient appears well-developed and well-nourished. HEENT:   Head: Normocephalic. Atraumatic  Eyes: pupils are equal and reactive. No scleral icterus is present. Neck: No mass and no thyromegaly present. Cardiovascular: Normal rate, regular rhythm, S1 normal and S2 normal.  Radial pulses present   Pulmonary/Chest: Effort normal and breath sounds normal. No retractions  Abdominal: Soft. Normal appearance. There is no organomegaly. No tenderness. There is no rigidity, no rebound, no guarding and no Claudio's sign. Musculoskeletal:        Right lower leg: Normal. No tenderness and no edema. Left lower leg: Normal. No tenderness and no edema. Neurological: The patient is alert and oriented. Moving all 4 extremities, sensation grossly intact bilateral  Skin: Skin is warm, dry and intact. Psychiatric: The patient has a normal mood and affect.  Speech is normal and behavior is normal. Judgment and thought content normal. Cognition and memory are normal.     Assessment:  Hypertension  Obstructive sleep apnea  Morbid Obesity  Limited compliance with bariatric lifestyle    Plan:  Protein 80 gm/day  Water 60 oz per day  Exercise 30 minutes per day  Calories under 1200 per day  Follow up in 6 months for closer follow up  We discussed improvement of her medical co-morbidities with weight loss including her hypertension and sleep apnea  Bariatric compliance discussed with patient  I spent over 15 minutes in

## 2019-01-23 ENCOUNTER — OFFICE VISIT (OUTPATIENT)
Dept: ONCOLOGY | Age: 49
End: 2019-01-23
Payer: MEDICAID

## 2019-01-23 VITALS
HEART RATE: 68 BPM | DIASTOLIC BLOOD PRESSURE: 97 MMHG | RESPIRATION RATE: 20 BRPM | WEIGHT: 293 LBS | SYSTOLIC BLOOD PRESSURE: 165 MMHG | HEIGHT: 66 IN | TEMPERATURE: 97.2 F | BODY MASS INDEX: 47.09 KG/M2

## 2019-01-23 DIAGNOSIS — D64.9 NORMOCYTIC ANEMIA: Primary | ICD-10-CM

## 2019-01-23 PROCEDURE — G8427 DOCREV CUR MEDS BY ELIG CLIN: HCPCS | Performed by: INTERNAL MEDICINE

## 2019-01-23 PROCEDURE — 99214 OFFICE O/P EST MOD 30 MIN: CPT | Performed by: INTERNAL MEDICINE

## 2019-01-23 PROCEDURE — 1036F TOBACCO NON-USER: CPT | Performed by: INTERNAL MEDICINE

## 2019-01-23 PROCEDURE — G8484 FLU IMMUNIZE NO ADMIN: HCPCS | Performed by: INTERNAL MEDICINE

## 2019-01-23 PROCEDURE — G8417 CALC BMI ABV UP PARAM F/U: HCPCS | Performed by: INTERNAL MEDICINE

## 2019-01-25 ENCOUNTER — HOSPITAL ENCOUNTER (OUTPATIENT)
Age: 49
Discharge: HOME | End: 2019-01-25
Payer: MEDICAID

## 2019-01-25 DIAGNOSIS — D50.9 IRON DEFICIENCY ANEMIA, UNSPECIFIED IRON DEFICIENCY ANEMIA TYPE: ICD-10-CM

## 2019-01-25 DIAGNOSIS — E66.01 MORBID OBESITY WITH BMI OF 60.0-69.9, ADULT (HCC): ICD-10-CM

## 2019-01-25 DIAGNOSIS — D64.9 NORMOCYTIC ANEMIA: ICD-10-CM

## 2019-01-25 LAB
ABSOLUTE EOS #: <0.03 K/UL (ref 0–0.44)
ABSOLUTE IMMATURE GRANULOCYTE: <0.03 K/UL (ref 0–0.3)
ABSOLUTE LYMPH #: 2.1 K/UL (ref 1.1–3.7)
ABSOLUTE MONO #: 0.52 K/UL (ref 0.1–1.2)
BASOPHILS # BLD: 1 % (ref 0–2)
BASOPHILS ABSOLUTE: 0.05 K/UL (ref 0–0.2)
DIFFERENTIAL TYPE: ABNORMAL
EOSINOPHILS RELATIVE PERCENT: 0 % (ref 1–4)
FERRITIN: 64 UG/L (ref 13–150)
FOLATE: 11.4 NG/ML
HCT VFR BLD CALC: 38.7 % (ref 36.3–47.1)
HEMOGLOBIN: 11.6 G/DL (ref 11.9–15.1)
IMMATURE GRANULOCYTES: 0 %
IRON SATURATION: 10 % (ref 20–55)
IRON: 23 UG/DL (ref 37–145)
LYMPHOCYTES # BLD: 33 % (ref 24–43)
MCH RBC QN AUTO: 26.5 PG (ref 25.2–33.5)
MCHC RBC AUTO-ENTMCNC: 30 G/DL (ref 28.4–34.8)
MCV RBC AUTO: 88.4 FL (ref 82.6–102.9)
MONOCYTES # BLD: 8 % (ref 3–12)
NRBC AUTOMATED: 0 PER 100 WBC
PDW BLD-RTO: 15.9 % (ref 11.8–14.4)
PLATELET # BLD: 291 K/UL (ref 138–453)
PLATELET ESTIMATE: ABNORMAL
PMV BLD AUTO: 10.6 FL (ref 8.1–13.5)
RBC # BLD: 4.38 M/UL (ref 3.95–5.11)
RBC # BLD: ABNORMAL 10*6/UL
SEG NEUTROPHILS: 58 % (ref 36–65)
SEGMENTED NEUTROPHILS ABSOLUTE COUNT: 3.69 K/UL (ref 1.5–8.1)
TOTAL IRON BINDING CAPACITY: 236 UG/DL (ref 250–450)
UNSATURATED IRON BINDING CAPACITY: 212.7 UG/DL (ref 112–347)
VITAMIN B-12: 482 PG/ML (ref 232–1245)
WBC # BLD: 6.4 K/UL (ref 3.5–11.3)
WBC # BLD: ABNORMAL 10*3/UL

## 2019-01-25 PROCEDURE — 82746 ASSAY OF FOLIC ACID SERUM: CPT

## 2019-01-25 PROCEDURE — 82728 ASSAY OF FERRITIN: CPT

## 2019-01-25 PROCEDURE — 85025 COMPLETE CBC W/AUTO DIFF WBC: CPT

## 2019-01-25 PROCEDURE — 82607 VITAMIN B-12: CPT

## 2019-01-25 PROCEDURE — 36415 COLL VENOUS BLD VENIPUNCTURE: CPT

## 2019-01-25 PROCEDURE — 83540 ASSAY OF IRON: CPT

## 2019-01-25 PROCEDURE — 83550 IRON BINDING TEST: CPT

## 2019-01-30 ENCOUNTER — APPOINTMENT (OUTPATIENT)
Dept: PHYSICAL THERAPY | Age: 49
End: 2019-01-30
Payer: MEDICAID

## 2019-01-30 DIAGNOSIS — K90.9 IRON MALABSORPTION: ICD-10-CM

## 2019-02-01 ENCOUNTER — TELEPHONE (OUTPATIENT)
Dept: ONCOLOGY | Age: 49
End: 2019-02-01

## 2019-02-01 RX ORDER — 0.9 % SODIUM CHLORIDE 0.9 %
10 VIAL (ML) INJECTION ONCE
Status: CANCELLED | OUTPATIENT
Start: 2019-02-01 | End: 2019-02-01

## 2019-02-01 RX ORDER — METHYLPREDNISOLONE SODIUM SUCCINATE 125 MG/2ML
125 INJECTION, POWDER, LYOPHILIZED, FOR SOLUTION INTRAMUSCULAR; INTRAVENOUS ONCE
Status: CANCELLED | OUTPATIENT
Start: 2019-02-01 | End: 2019-02-01

## 2019-02-01 RX ORDER — SODIUM CHLORIDE 9 MG/ML
INJECTION, SOLUTION INTRAVENOUS ONCE
Status: CANCELLED | OUTPATIENT
Start: 2019-02-01 | End: 2019-02-01

## 2019-02-01 RX ORDER — HEPARIN SODIUM (PORCINE) LOCK FLUSH IV SOLN 100 UNIT/ML 100 UNIT/ML
500 SOLUTION INTRAVENOUS PRN
Status: CANCELLED | OUTPATIENT
Start: 2019-02-01

## 2019-02-01 RX ORDER — SODIUM CHLORIDE 0.9 % (FLUSH) 0.9 %
10 SYRINGE (ML) INJECTION PRN
Status: CANCELLED | OUTPATIENT
Start: 2019-02-01

## 2019-02-01 RX ORDER — DIPHENHYDRAMINE HYDROCHLORIDE 50 MG/ML
50 INJECTION INTRAMUSCULAR; INTRAVENOUS ONCE
Status: CANCELLED | OUTPATIENT
Start: 2019-02-01 | End: 2019-02-01

## 2019-02-01 RX ORDER — SODIUM CHLORIDE 0.9 % (FLUSH) 0.9 %
5 SYRINGE (ML) INJECTION PRN
Status: CANCELLED | OUTPATIENT
Start: 2019-02-01

## 2019-02-01 RX ORDER — SODIUM CHLORIDE 9 MG/ML
INJECTION, SOLUTION INTRAVENOUS CONTINUOUS
Status: CANCELLED | OUTPATIENT
Start: 2019-02-01

## 2019-02-04 ENCOUNTER — HOSPITAL ENCOUNTER (OUTPATIENT)
Dept: PHYSICAL THERAPY | Age: 49
Setting detail: THERAPIES SERIES
Discharge: HOME | End: 2019-02-04
Payer: MEDICAID

## 2019-02-04 PROCEDURE — G0283 ELEC STIM OTHER THAN WOUND: HCPCS

## 2019-02-04 PROCEDURE — 97161 PT EVAL LOW COMPLEX 20 MIN: CPT

## 2019-02-04 PROCEDURE — 97110 THERAPEUTIC EXERCISES: CPT

## 2019-02-05 ENCOUNTER — HOSPITAL ENCOUNTER (OUTPATIENT)
Dept: INFUSION THERAPY | Age: 49
Discharge: HOME | End: 2019-02-05
Payer: MEDICAID

## 2019-02-05 VITALS
TEMPERATURE: 97 F | RESPIRATION RATE: 20 BRPM | HEART RATE: 60 BPM | DIASTOLIC BLOOD PRESSURE: 95 MMHG | SYSTOLIC BLOOD PRESSURE: 167 MMHG

## 2019-02-05 DIAGNOSIS — K90.9 IRON MALABSORPTION: ICD-10-CM

## 2019-02-05 DIAGNOSIS — D50.9 IRON DEFICIENCY ANEMIA, UNSPECIFIED IRON DEFICIENCY ANEMIA TYPE: ICD-10-CM

## 2019-02-05 PROCEDURE — 96372 THER/PROPH/DIAG INJ SC/IM: CPT

## 2019-02-05 PROCEDURE — 6360000002 HC RX W HCPCS: Performed by: INTERNAL MEDICINE

## 2019-02-05 PROCEDURE — 2580000003 HC RX 258: Performed by: INTERNAL MEDICINE

## 2019-02-05 PROCEDURE — 96365 THER/PROPH/DIAG IV INF INIT: CPT

## 2019-02-05 RX ORDER — 0.9 % SODIUM CHLORIDE 0.9 %
10 VIAL (ML) INJECTION ONCE
Status: CANCELLED | OUTPATIENT
Start: 2019-02-05 | End: 2019-02-05

## 2019-02-05 RX ORDER — SODIUM CHLORIDE 9 MG/ML
INJECTION, SOLUTION INTRAVENOUS ONCE
Status: COMPLETED | OUTPATIENT
Start: 2019-02-05 | End: 2019-02-05

## 2019-02-05 RX ORDER — SODIUM CHLORIDE 0.9 % (FLUSH) 0.9 %
5 SYRINGE (ML) INJECTION PRN
Status: CANCELLED | OUTPATIENT
Start: 2019-02-05

## 2019-02-05 RX ORDER — SODIUM CHLORIDE 9 MG/ML
INJECTION, SOLUTION INTRAVENOUS ONCE
Status: CANCELLED | OUTPATIENT
Start: 2019-02-05 | End: 2019-02-05

## 2019-02-05 RX ORDER — SODIUM CHLORIDE 0.9 % (FLUSH) 0.9 %
10 SYRINGE (ML) INJECTION PRN
Status: CANCELLED | OUTPATIENT
Start: 2019-02-05

## 2019-02-05 RX ORDER — SODIUM CHLORIDE 0.9 % (FLUSH) 0.9 %
10 SYRINGE (ML) INJECTION PRN
Status: DISCONTINUED | OUTPATIENT
Start: 2019-02-05 | End: 2019-02-06 | Stop reason: HOSPADM

## 2019-02-05 RX ORDER — HEPARIN SODIUM (PORCINE) LOCK FLUSH IV SOLN 100 UNIT/ML 100 UNIT/ML
500 SOLUTION INTRAVENOUS PRN
Status: CANCELLED | OUTPATIENT
Start: 2019-02-05

## 2019-02-05 RX ORDER — SODIUM CHLORIDE 9 MG/ML
INJECTION, SOLUTION INTRAVENOUS CONTINUOUS
Status: CANCELLED | OUTPATIENT
Start: 2019-02-05

## 2019-02-05 RX ORDER — METHYLPREDNISOLONE SODIUM SUCCINATE 125 MG/2ML
125 INJECTION, POWDER, LYOPHILIZED, FOR SOLUTION INTRAMUSCULAR; INTRAVENOUS ONCE
Status: CANCELLED | OUTPATIENT
Start: 2019-02-05 | End: 2019-02-05

## 2019-02-05 RX ORDER — EPINEPHRINE 1 MG/ML
0.3 INJECTION, SOLUTION, CONCENTRATE INTRAVENOUS PRN
Status: CANCELLED | OUTPATIENT
Start: 2019-02-05

## 2019-02-05 RX ORDER — DIPHENHYDRAMINE HYDROCHLORIDE 50 MG/ML
50 INJECTION INTRAMUSCULAR; INTRAVENOUS ONCE
Status: CANCELLED | OUTPATIENT
Start: 2019-02-05 | End: 2019-02-05

## 2019-02-05 RX ADMIN — SODIUM CHLORIDE: 9 INJECTION, SOLUTION INTRAVENOUS at 12:21

## 2019-02-05 RX ADMIN — FERRIC CARBOXYMALTOSE INJECTION 750 MG: 50 INJECTION, SOLUTION INTRAVENOUS at 12:23

## 2019-02-05 RX ADMIN — Medication 10 ML: at 12:21

## 2019-02-06 ENCOUNTER — HOSPITAL ENCOUNTER (OUTPATIENT)
Dept: PHYSICAL THERAPY | Age: 49
Setting detail: THERAPIES SERIES
Discharge: HOME | End: 2019-02-06
Payer: MEDICAID

## 2019-02-06 PROCEDURE — G0283 ELEC STIM OTHER THAN WOUND: HCPCS

## 2019-02-06 PROCEDURE — 97110 THERAPEUTIC EXERCISES: CPT

## 2019-02-08 ENCOUNTER — HOSPITAL ENCOUNTER (OUTPATIENT)
Dept: PHYSICAL THERAPY | Age: 49
Setting detail: THERAPIES SERIES
Discharge: HOME | End: 2019-02-08
Payer: MEDICAID

## 2019-02-08 PROCEDURE — 97110 THERAPEUTIC EXERCISES: CPT

## 2019-02-08 PROCEDURE — G0283 ELEC STIM OTHER THAN WOUND: HCPCS

## 2019-02-12 ENCOUNTER — HOSPITAL ENCOUNTER (OUTPATIENT)
Dept: INFUSION THERAPY | Age: 49
Discharge: HOME | End: 2019-02-12
Payer: MEDICAID

## 2019-02-12 VITALS
DIASTOLIC BLOOD PRESSURE: 96 MMHG | RESPIRATION RATE: 20 BRPM | TEMPERATURE: 97.1 F | HEART RATE: 66 BPM | SYSTOLIC BLOOD PRESSURE: 150 MMHG

## 2019-02-12 DIAGNOSIS — D50.9 IRON DEFICIENCY ANEMIA, UNSPECIFIED IRON DEFICIENCY ANEMIA TYPE: ICD-10-CM

## 2019-02-12 DIAGNOSIS — K90.9 IRON MALABSORPTION: ICD-10-CM

## 2019-02-12 PROCEDURE — 2580000003 HC RX 258: Performed by: INTERNAL MEDICINE

## 2019-02-12 PROCEDURE — 96365 THER/PROPH/DIAG IV INF INIT: CPT

## 2019-02-12 PROCEDURE — 6360000002 HC RX W HCPCS: Performed by: INTERNAL MEDICINE

## 2019-02-12 RX ORDER — SODIUM CHLORIDE 9 MG/ML
INJECTION, SOLUTION INTRAVENOUS ONCE
Status: CANCELLED | OUTPATIENT
Start: 2019-02-12 | End: 2019-02-12

## 2019-02-12 RX ORDER — SODIUM CHLORIDE 0.9 % (FLUSH) 0.9 %
10 SYRINGE (ML) INJECTION PRN
Status: DISCONTINUED | OUTPATIENT
Start: 2019-02-12 | End: 2019-02-13 | Stop reason: HOSPADM

## 2019-02-12 RX ORDER — SODIUM CHLORIDE 9 MG/ML
INJECTION, SOLUTION INTRAVENOUS CONTINUOUS
Status: CANCELLED | OUTPATIENT
Start: 2019-02-12

## 2019-02-12 RX ORDER — 0.9 % SODIUM CHLORIDE 0.9 %
10 VIAL (ML) INJECTION ONCE
Status: CANCELLED | OUTPATIENT
Start: 2019-02-12 | End: 2019-02-12

## 2019-02-12 RX ORDER — SODIUM CHLORIDE 9 MG/ML
INJECTION, SOLUTION INTRAVENOUS ONCE
Status: COMPLETED | OUTPATIENT
Start: 2019-02-12 | End: 2019-02-12

## 2019-02-12 RX ORDER — METHYLPREDNISOLONE SODIUM SUCCINATE 125 MG/2ML
125 INJECTION, POWDER, LYOPHILIZED, FOR SOLUTION INTRAMUSCULAR; INTRAVENOUS ONCE
Status: CANCELLED | OUTPATIENT
Start: 2019-02-12 | End: 2019-02-12

## 2019-02-12 RX ORDER — HEPARIN SODIUM (PORCINE) LOCK FLUSH IV SOLN 100 UNIT/ML 100 UNIT/ML
500 SOLUTION INTRAVENOUS PRN
Status: CANCELLED | OUTPATIENT
Start: 2019-02-12

## 2019-02-12 RX ORDER — EPINEPHRINE 1 MG/ML
0.3 INJECTION, SOLUTION, CONCENTRATE INTRAVENOUS PRN
Status: CANCELLED | OUTPATIENT
Start: 2019-02-12

## 2019-02-12 RX ORDER — SODIUM CHLORIDE 0.9 % (FLUSH) 0.9 %
10 SYRINGE (ML) INJECTION PRN
Status: CANCELLED | OUTPATIENT
Start: 2019-02-12

## 2019-02-12 RX ORDER — DIPHENHYDRAMINE HYDROCHLORIDE 50 MG/ML
50 INJECTION INTRAMUSCULAR; INTRAVENOUS ONCE
Status: CANCELLED | OUTPATIENT
Start: 2019-02-12 | End: 2019-02-12

## 2019-02-12 RX ORDER — SODIUM CHLORIDE 0.9 % (FLUSH) 0.9 %
5 SYRINGE (ML) INJECTION PRN
Status: CANCELLED | OUTPATIENT
Start: 2019-02-12

## 2019-02-12 RX ADMIN — Medication 10 ML: at 11:05

## 2019-02-12 RX ADMIN — SODIUM CHLORIDE: 9 INJECTION, SOLUTION INTRAVENOUS at 11:09

## 2019-02-12 RX ADMIN — FERRIC CARBOXYMALTOSE INJECTION 750 MG: 50 INJECTION, SOLUTION INTRAVENOUS at 11:10

## 2019-02-13 ENCOUNTER — HOSPITAL ENCOUNTER (OUTPATIENT)
Dept: PHYSICAL THERAPY | Age: 49
Setting detail: THERAPIES SERIES
Discharge: HOME | End: 2019-02-13
Payer: MEDICAID

## 2019-02-13 PROCEDURE — 97110 THERAPEUTIC EXERCISES: CPT

## 2019-02-13 PROCEDURE — G0283 ELEC STIM OTHER THAN WOUND: HCPCS

## 2019-02-13 PROCEDURE — 97140 MANUAL THERAPY 1/> REGIONS: CPT

## 2019-02-14 ENCOUNTER — HOSPITAL ENCOUNTER (OUTPATIENT)
Dept: PHYSICAL THERAPY | Age: 49
Setting detail: THERAPIES SERIES
Discharge: HOME | End: 2019-02-14
Payer: MEDICAID

## 2019-02-14 PROCEDURE — 97140 MANUAL THERAPY 1/> REGIONS: CPT

## 2019-02-14 PROCEDURE — 97110 THERAPEUTIC EXERCISES: CPT

## 2019-02-14 PROCEDURE — G0283 ELEC STIM OTHER THAN WOUND: HCPCS

## 2019-02-15 ENCOUNTER — HOSPITAL ENCOUNTER (OUTPATIENT)
Dept: PHYSICAL THERAPY | Age: 49
Setting detail: THERAPIES SERIES
Discharge: HOME | End: 2019-02-15
Payer: MEDICAID

## 2019-02-15 PROCEDURE — 97110 THERAPEUTIC EXERCISES: CPT

## 2019-02-15 PROCEDURE — G0283 ELEC STIM OTHER THAN WOUND: HCPCS

## 2019-02-15 PROCEDURE — 97140 MANUAL THERAPY 1/> REGIONS: CPT

## 2019-02-18 ENCOUNTER — HOSPITAL ENCOUNTER (OUTPATIENT)
Dept: PHYSICAL THERAPY | Age: 49
Setting detail: THERAPIES SERIES
Discharge: HOME | End: 2019-02-18
Payer: MEDICAID

## 2019-02-18 PROCEDURE — G0283 ELEC STIM OTHER THAN WOUND: HCPCS

## 2019-02-18 PROCEDURE — 97110 THERAPEUTIC EXERCISES: CPT

## 2019-02-20 ENCOUNTER — HOSPITAL ENCOUNTER (OUTPATIENT)
Dept: PHYSICAL THERAPY | Age: 49
Setting detail: THERAPIES SERIES
Discharge: HOME | End: 2019-02-20
Payer: MEDICAID

## 2019-02-20 PROCEDURE — 97110 THERAPEUTIC EXERCISES: CPT

## 2019-02-20 PROCEDURE — 97140 MANUAL THERAPY 1/> REGIONS: CPT

## 2019-02-20 PROCEDURE — G0283 ELEC STIM OTHER THAN WOUND: HCPCS

## 2019-02-22 ENCOUNTER — HOSPITAL ENCOUNTER (OUTPATIENT)
Dept: PHYSICAL THERAPY | Age: 49
Setting detail: THERAPIES SERIES
Discharge: HOME | End: 2019-02-22
Payer: MEDICAID

## 2019-02-22 PROCEDURE — 97140 MANUAL THERAPY 1/> REGIONS: CPT

## 2019-02-22 PROCEDURE — G0283 ELEC STIM OTHER THAN WOUND: HCPCS

## 2019-02-22 PROCEDURE — 97110 THERAPEUTIC EXERCISES: CPT

## 2019-02-25 ENCOUNTER — HOSPITAL ENCOUNTER (OUTPATIENT)
Dept: PHYSICAL THERAPY | Age: 49
Setting detail: THERAPIES SERIES
Discharge: HOME | End: 2019-02-25
Payer: MEDICAID

## 2019-02-25 PROCEDURE — 97110 THERAPEUTIC EXERCISES: CPT

## 2019-02-25 PROCEDURE — G0283 ELEC STIM OTHER THAN WOUND: HCPCS

## 2019-02-27 ENCOUNTER — HOSPITAL ENCOUNTER (OUTPATIENT)
Dept: PHYSICAL THERAPY | Age: 49
Setting detail: THERAPIES SERIES
Discharge: HOME | End: 2019-02-27
Payer: MEDICAID

## 2019-02-27 PROCEDURE — 97110 THERAPEUTIC EXERCISES: CPT

## 2019-02-27 PROCEDURE — 97140 MANUAL THERAPY 1/> REGIONS: CPT

## 2019-02-27 PROCEDURE — G0283 ELEC STIM OTHER THAN WOUND: HCPCS

## 2019-03-01 ENCOUNTER — HOSPITAL ENCOUNTER (OUTPATIENT)
Dept: PHYSICAL THERAPY | Age: 49
Setting detail: THERAPIES SERIES
Discharge: HOME | End: 2019-03-01
Payer: MEDICAID

## 2019-03-01 PROCEDURE — 97110 THERAPEUTIC EXERCISES: CPT

## 2019-03-01 PROCEDURE — G0283 ELEC STIM OTHER THAN WOUND: HCPCS

## 2019-03-01 PROCEDURE — 97140 MANUAL THERAPY 1/> REGIONS: CPT

## 2019-03-06 ENCOUNTER — HOSPITAL ENCOUNTER (OUTPATIENT)
Dept: PHYSICAL THERAPY | Age: 49
Setting detail: THERAPIES SERIES
Discharge: HOME | End: 2019-03-06
Payer: MEDICAID

## 2019-03-06 PROCEDURE — 97140 MANUAL THERAPY 1/> REGIONS: CPT

## 2019-03-06 PROCEDURE — 97110 THERAPEUTIC EXERCISES: CPT

## 2019-03-06 PROCEDURE — G0283 ELEC STIM OTHER THAN WOUND: HCPCS

## 2019-03-08 ENCOUNTER — HOSPITAL ENCOUNTER (OUTPATIENT)
Dept: PHYSICAL THERAPY | Age: 49
Setting detail: THERAPIES SERIES
Discharge: HOME | End: 2019-03-08
Payer: MEDICAID

## 2019-03-08 PROCEDURE — 97140 MANUAL THERAPY 1/> REGIONS: CPT

## 2019-03-08 PROCEDURE — G0283 ELEC STIM OTHER THAN WOUND: HCPCS

## 2019-03-08 PROCEDURE — 97110 THERAPEUTIC EXERCISES: CPT

## 2019-03-11 ENCOUNTER — HOSPITAL ENCOUNTER (OUTPATIENT)
Dept: PHYSICAL THERAPY | Age: 49
Setting detail: THERAPIES SERIES
Discharge: HOME | End: 2019-03-11
Payer: MEDICAID

## 2019-03-11 PROCEDURE — 97110 THERAPEUTIC EXERCISES: CPT

## 2019-03-11 PROCEDURE — 97140 MANUAL THERAPY 1/> REGIONS: CPT

## 2019-03-11 PROCEDURE — G0283 ELEC STIM OTHER THAN WOUND: HCPCS

## 2019-03-13 ENCOUNTER — HOSPITAL ENCOUNTER (OUTPATIENT)
Dept: PHYSICAL THERAPY | Age: 49
Setting detail: THERAPIES SERIES
Discharge: HOME | End: 2019-03-13
Payer: MEDICAID

## 2019-03-13 PROCEDURE — 97140 MANUAL THERAPY 1/> REGIONS: CPT

## 2019-03-13 PROCEDURE — 97110 THERAPEUTIC EXERCISES: CPT

## 2019-03-13 PROCEDURE — G0283 ELEC STIM OTHER THAN WOUND: HCPCS

## 2019-03-15 ENCOUNTER — HOSPITAL ENCOUNTER (OUTPATIENT)
Dept: PHYSICAL THERAPY | Age: 49
Setting detail: THERAPIES SERIES
Discharge: HOME | End: 2019-03-15
Payer: MEDICAID

## 2019-03-15 PROCEDURE — G0283 ELEC STIM OTHER THAN WOUND: HCPCS

## 2019-03-15 PROCEDURE — 97110 THERAPEUTIC EXERCISES: CPT

## 2019-03-15 PROCEDURE — 97140 MANUAL THERAPY 1/> REGIONS: CPT

## 2019-03-18 ENCOUNTER — HOSPITAL ENCOUNTER (OUTPATIENT)
Dept: PHYSICAL THERAPY | Age: 49
Setting detail: THERAPIES SERIES
Discharge: HOME | End: 2019-03-18
Payer: MEDICAID

## 2019-03-18 PROCEDURE — 97110 THERAPEUTIC EXERCISES: CPT

## 2019-03-18 PROCEDURE — G0283 ELEC STIM OTHER THAN WOUND: HCPCS

## 2019-03-18 PROCEDURE — 97140 MANUAL THERAPY 1/> REGIONS: CPT

## 2019-03-20 ENCOUNTER — HOSPITAL ENCOUNTER (OUTPATIENT)
Dept: PHYSICAL THERAPY | Age: 49
Setting detail: THERAPIES SERIES
Discharge: HOME | End: 2019-03-20
Payer: MEDICAID

## 2019-03-20 PROCEDURE — G0283 ELEC STIM OTHER THAN WOUND: HCPCS

## 2019-03-20 PROCEDURE — 97110 THERAPEUTIC EXERCISES: CPT

## 2019-03-20 PROCEDURE — 97140 MANUAL THERAPY 1/> REGIONS: CPT

## 2019-03-22 ENCOUNTER — HOSPITAL ENCOUNTER (OUTPATIENT)
Dept: PHYSICAL THERAPY | Age: 49
Setting detail: THERAPIES SERIES
Discharge: HOME | End: 2019-03-22
Payer: MEDICAID

## 2019-03-22 PROCEDURE — 97140 MANUAL THERAPY 1/> REGIONS: CPT

## 2019-03-22 PROCEDURE — 97110 THERAPEUTIC EXERCISES: CPT

## 2019-03-22 PROCEDURE — G0283 ELEC STIM OTHER THAN WOUND: HCPCS

## 2019-03-25 ENCOUNTER — HOSPITAL ENCOUNTER (OUTPATIENT)
Dept: PHYSICAL THERAPY | Age: 49
Setting detail: THERAPIES SERIES
Discharge: HOME | End: 2019-03-25
Payer: MEDICAID

## 2019-03-25 PROCEDURE — G0283 ELEC STIM OTHER THAN WOUND: HCPCS

## 2019-03-25 PROCEDURE — 97110 THERAPEUTIC EXERCISES: CPT

## 2019-03-25 PROCEDURE — 97140 MANUAL THERAPY 1/> REGIONS: CPT

## 2019-03-27 ENCOUNTER — HOSPITAL ENCOUNTER (OUTPATIENT)
Dept: PHYSICAL THERAPY | Age: 49
Setting detail: THERAPIES SERIES
Discharge: HOME | End: 2019-03-27
Payer: MEDICAID

## 2019-03-27 PROCEDURE — G0283 ELEC STIM OTHER THAN WOUND: HCPCS

## 2019-03-27 PROCEDURE — 97110 THERAPEUTIC EXERCISES: CPT

## 2019-03-29 ENCOUNTER — HOSPITAL ENCOUNTER (OUTPATIENT)
Dept: PHYSICAL THERAPY | Age: 49
Setting detail: THERAPIES SERIES
Discharge: HOME | End: 2019-03-29
Payer: MEDICAID

## 2019-03-29 PROCEDURE — G0283 ELEC STIM OTHER THAN WOUND: HCPCS

## 2019-03-29 PROCEDURE — 97110 THERAPEUTIC EXERCISES: CPT

## 2019-04-01 ENCOUNTER — HOSPITAL ENCOUNTER (OUTPATIENT)
Dept: PHYSICAL THERAPY | Age: 49
Setting detail: THERAPIES SERIES
Discharge: HOME | End: 2019-04-01
Payer: MEDICAID

## 2019-04-01 PROCEDURE — 97113 AQUATIC THERAPY/EXERCISES: CPT

## 2019-04-01 NOTE — PROGRESS NOTES
Phone: [phone]                 [facility]           Fax: [phone]                           Outpatient Physical Therapy                                                                            Daily Note    Patient: Arcelia Walters : 1970  CSN #: [id number]   Referring Practitioner:  Smiley Draper. Salazar myers CNP    Referral Date : 19     Date: 2019    Diagnosis: Sprain R medial ankle joint, S93.421A       Onset Date: 18  PT Insurance Information: [facility]  Total # of Visits Approved: 29 Per Physician Order  Total # of Visits to Date: 24  No Show: 0  Canceled Appointment: 2      Pre-Treatment Pain:  5/10  Subjective: Pt states pain in low back at 3/10, R ankle 0/10  today. Exercises:  Exercise 20: water walking x2 laps each of: forward, retro. side stepping, sink ex x10, side lunges x10, FSU/LSU x10 bench ex x10, biking in well x 5 min, hanging in well x5 min, jet massage x5 min. Assessment  Assessment: Pt entered pool area with SPC, negotiated steps, non-reciprocally with B HR. Required frequent standing RBs during tx due to LBP. Patient Education  Educated pt on pool safety and protocol. Pt verbalized/demonstrated good understanding:     [x] Yes         [] No, pt required further clarification. Post Treatment Pain:  7/10      Plan  Times per week: 2-3  Plan weeks: 4      Goals  (Total # of Visits to Date: 25)   Short Term Goals - Time Frame for Short term goals: 2 weeks    Short term goal 1: Initiate HEP and progress as tolerated for strength and ROM. -MET                                        [x]Met   []Partially met  []Not met      []Met   []Partially met  []Not met   Short term goal 3: Pt will be able to move the R ankle throughout ROM without complaints of pain.  -progressing  []Met   [x]Partially met  []Not met      []Met []Partially met  []Not met     Long Term Goals - Time Frame for Long term goals : 4 weeks  Long term goal 1: Pt will be independent and compliant with her HEP for stability of the ankle for MRADLs []Met  []Partially met  [x]Not met   Long term goal 2: Pt will report pain as controlled and no greater than 2/10 in the R ankle to improve tolerance to ADLs []Met  []Partially met  [x]Not met   Long term goal 3: Pt strength in the R ankle will be functional and she will be able to do 10 consecutive heel raises for functional strength to push off for gait. - met [x]Met  []Partially met  []Not met   Long term goal 4: Pt will be out of the cam boot and in a normal shoe for ambulation community distances.-met [x]Met  []Partially met  []Not met     []Met  []Partially met  []Not met       Minutes Tracking:  Time In: 1 Demario Robles  Time Out: [address]  Minutes: [address] [facility] [place] [address]    Date: 4/1/2019

## 2019-04-05 ENCOUNTER — HOSPITAL ENCOUNTER (OUTPATIENT)
Dept: PHYSICAL THERAPY | Age: 49
Setting detail: THERAPIES SERIES
Discharge: HOME | End: 2019-04-05
Payer: MEDICAID

## 2019-04-05 PROCEDURE — 97110 THERAPEUTIC EXERCISES: CPT

## 2019-04-05 NOTE — PROGRESS NOTES
Phone: [phone]                 [facility]           Fax: [phone]                           Outpatient Physical Therapy                                                                            Daily Note    Patient: Noe Montoya : 1970  CSN #: [id number]   Referring Practitioner:  Christal Daily. Salazar myersIRIS    Referral Date : 19     Date: 2019    Diagnosis: Sprain R medial ankle joint, H09.684U  Treatment Diagnosis: difficulty walking    Onset Date: 18  PT Insurance Information: [facility]  Total # of Visits Approved: 29 Per Physician Order  Total # of Visits to Date: 25  No Show: 0  Canceled Appointment: 2      Pre-Treatment Pain:  6/10  Subjective: Pt reports \"the pool felt great and she got a good workout\" but she did noticed increased muscle soreness after. Pt states she has been walking around at home alot more without SC and her back/ L hip is really hurting today. Pt rates current pain a 6/10. Exercises:  Exercise 1: **HEP ankle ABCs/towel stretch/sitting heel raises and toe raises  Exercise 2: Recum bike 10mins, L4.5, hills  Exercise 11: FSU/LSU 15x 6 inch -- RLE only this date d/t L knee pain  Exercise 12: Sink ex 15x ea  Exercise 15: SLS 3x30 sec  Exercise 16: 150' amb no ADx2  Exercise 17: Tandem walk 2x25'   Exercise 18: sideways amb at counter 6 laps PTB     Modalities:  Cryotherapy (Minutes\Location): x15min -LB today     Assessment  Assessment: Pt amb 150'x2 today without AD but limited on all other ex d/t LBP reported. Pt encouraged to cont using LB stretches and cont to focus on gait cycle for decreased stress placed on LB/ hip during amb. Patient Education  *Patient Education: Cont current stretching program.   Pt verbalized/demonstrated good understanding:     [x] Yes         [] No, pt required further clarification.     Post Treatment Pain:  6/10      Plan  Times per week: 2-3  Plan weeks: 4      Goals  (Total # of Visits to Date: 22)

## 2019-04-08 ENCOUNTER — APPOINTMENT (OUTPATIENT)
Dept: PHYSICAL THERAPY | Age: 49
End: 2019-04-08
Payer: MEDICAID

## 2019-04-12 ENCOUNTER — HOSPITAL ENCOUNTER (OUTPATIENT)
Dept: PHYSICAL THERAPY | Age: 49
Setting detail: THERAPIES SERIES
Discharge: HOME | End: 2019-04-12
Payer: MEDICAID

## 2019-04-12 PROCEDURE — 97110 THERAPEUTIC EXERCISES: CPT

## 2019-04-12 NOTE — PROGRESS NOTES
Phone: [phone]                 [facility]           Fax: [phone]                           Outpatient Physical Therapy                                                                            Daily Note    Patient: Chantal Seo : 1970  CSN #: [id number]   Referring Practitioner:  Kraig Mayorga. Salazar myers CNP    Referral Date : 19     Date: 2019    Diagnosis: Sprain R medial ankle joint, B56.910G  Treatment Diagnosis: difficulty walking    Onset Date: 18  PT Insurance Information: [facility]  Total # of Visits Approved: 29 Per Physician Order  Total # of Visits to Date: 32  No Show: 0  Canceled Appointment: 2      Pre-Treatment Pain:  8/10  Subjective: Pt states she did a lot of standing at the grocery store yesterday and her pain level is about 8/10 in R ankle upon arrival this date. Exercises:  Exercise 2: Recum bike 10mins, L4.5, hills  Exercise 11: FSU/LSU 15x 6 inch -- RLE only this date d/t L knee pain  Exercise 12: Sink ex 15x ea  Exercise 13: Foot log roll 3mins  Exercise 15: SLS 3x30 sec  Exercise 16: 150' amb no ADx2  Exercise 17: Tandem walk 2x25'   Exercise 18: sideways amb at counter 6 laps PTB   Exercise 19: Cecilia taps 15x (short) ea     Modalities:  Cryotherapy (Minutes\Location): x15min R ankle       Assessment  Assessment: Pt ambulates ~300ft without AD, but requires occasional standing rest breaks d/t fatigue, ankle pain and LBP. Pt reports avg ankle pain 6/10, with lowest pain level 4-5/10. Pt reports compliance with HEP thus far, but requires rest breaks at home with those exercises as well. Will cont to progress as tolerated. Patient Education  Reviewed HEP. Pt verbalized/demonstrated good understanding:     [x] Yes         [] No, pt required further clarification.     Post Treatment Pain:  4/10      Plan  Times per week: 2-3  Plan weeks: 4      Goals  (Total # of Visits to Date: 32)   Short Term Goals - Time Frame for Short term goals: 2 weeks     Short term goal 1: Initiate HEP and progress as tolerated for strength and ROM. -MET                                        []Met   []Partially met  []Not met   Short term goal 2: Pt will report less than 4/10 pain in the R ankle to improve tolerance to strengthening for improved control of the ankle.-progressing  []Met   []Partially met  []Not met   Short term goal 3: Pt will be able to move the R ankle throughout ROM without complaints of pain. -progressing  []Met   []Partially met  []Not met      []Met  []Partially met  []Not met     Long Term Goals - Time Frame for Long term goals : 4 weeks  Long term goal 1: Pt will be independent and compliant with her HEP for stability of the ankle for MRADLs- met/cont []Met  []Partially met  []Not met   Long term goal 2: Pt will report pain as controlled and no greater than 2/10 in the R ankle to improve tolerance to ADLs []Met  []Partially met  []Not met   Long term goal 3: Pt strength in the R ankle will be functional and she will be able to do 10 consecutive heel raises for functional strength to push off for gait. - met []Met  []Partially met  []Not met   Long term goal 4: Pt will be out of the cam boot and in a normal shoe for ambulation community distances.-met []Met  []Partially met  []Not met     []Met  []Partially met  []Not met       Minutes Tracking:  Time In: 0942  Time Out: [address]  Minutes: [address]      Date: 4/12/2019

## 2019-04-15 ENCOUNTER — HOSPITAL ENCOUNTER (OUTPATIENT)
Dept: PHYSICAL THERAPY | Age: 49
Setting detail: THERAPIES SERIES
Discharge: HOME | End: 2019-04-15
Payer: MEDICAID

## 2019-04-15 PROCEDURE — 97113 AQUATIC THERAPY/EXERCISES: CPT

## 2019-04-15 ASSESSMENT — PAIN DESCRIPTION - PAIN TYPE: TYPE: CHRONIC PAIN

## 2019-04-15 ASSESSMENT — PAIN DESCRIPTION - ORIENTATION: ORIENTATION: RIGHT;LEFT

## 2019-04-15 ASSESSMENT — PAIN DESCRIPTION - DESCRIPTORS: DESCRIPTORS: ACHING;SHARP;THROBBING

## 2019-04-15 ASSESSMENT — PAIN DESCRIPTION - LOCATION: LOCATION: ANKLE;KNEE

## 2019-04-15 ASSESSMENT — PAIN SCALES - GENERAL: PAINLEVEL_OUTOF10: 6

## 2019-04-15 ASSESSMENT — PAIN DESCRIPTION - FREQUENCY: FREQUENCY: CONTINUOUS

## 2019-04-15 NOTE — PROGRESS NOTES
Phone: [phone]                 [facility]           Fax: [phone]                           Outpatient Physical Therapy                                                                            Daily Note    Patient: Jadon Robledo : 1970  CSN #: [id number]   Referring Practitioner:  Pop Berry. Salazar myers IRIS    Referral Date : 19     Date: 4/15/2019    Diagnosis: Sprain R medial ankle joint, K77.377T  Treatment Diagnosis: difficulty walking    Onset Date: 18  PT Insurance Information: [facility]  Total # of Visits Approved: 29 Per Physician Order  Total # of Visits to Date: 27  No Show: 0  Canceled Appointment: 2      Pre-Treatment Pain:  6/10  Subjective: Pt reports doing alot of standing over the weekend, R ankle pain at . L med. knee at 7/10. Exercises:  Exercise 20: water walking x2 laps each of: forward, retro. side stepping, sink ex x10, side lunges x10, FSU/LSU x10 bench ex x10, push blue float down x10, blue bar for abs x10, biking in well x 5 min, hanging in well x5 min, jet massage x5 min. Assessment  Assessment: Pt demonstrated fair tolerance to exercise progression with slight decrease in pain in R ankle. Patient Education  Educated pt on importance of HEP and rest when needed. Pt verbalized/demonstrated good understanding:     [x] Yes         [] No, pt required further clarification. Post Treatment Pain:  5/10      Plan  Times per week: 2-3  Plan weeks: 4      Goals  (Total # of Visits to Date: 32)   Short Term Goals - Time Frame for Short term goals: 2 weeks    Short term goal 1: Initiate HEP and progress as tolerated for strength and ROM. -MET                                        [x]Met   []Partially met  []Not met   Short term goal 2: Pt will report less than 4/10 pain in the R ankle to improve tolerance to strengthening for improved control of the ankle.-progressing  []Met   [x]Partially met  []Not met   Short term goal 3: Pt will be able to move the R ankle throughout ROM without complaints of pain. -progressing  []Met [x]Partially met  []Not met      []Met []Partially met  []Not met     Long Term Goals - Time Frame for Long term goals : 4 weeks  Long term goal 1: Pt will be independent and compliant with her HEP for stability of the ankle for MRADLs- met/cont [x]Met  []Partially met  []Not met   Long term goal 2: Pt will report pain as controlled and no greater than 2/10 in the R ankle to improve tolerance to ADLs []Met  []Partially met  [x]Not met   Long term goal 3: Pt strength in the R ankle will be functional and she will be able to do 10 consecutive heel raises for functional strength to push off for gait. - met [x]Met  []Partially met  []Not met   Long term goal 4: Pt will be out of the cam boot and in a normal shoe for ambulation community distances.-met [x]Met  []Partially met  []Not met     []Met  []Partially met  []Not met       Minutes Tracking:  Time In: 1100  Time Out: [facility]  Minutes: [facility] [id number]     Date: 4/15/2019

## 2019-04-19 ENCOUNTER — HOSPITAL ENCOUNTER (OUTPATIENT)
Dept: PHYSICAL THERAPY | Age: 49
Setting detail: THERAPIES SERIES
Discharge: HOME | End: 2019-04-19
Payer: MEDICAID

## 2019-04-19 PROCEDURE — 97110 THERAPEUTIC EXERCISES: CPT

## 2019-04-19 NOTE — PROGRESS NOTES
Phone: [phone]                 [facility]           Fax: [phone]                           Outpatient Physical Therapy                                                                            Daily Note    Patient: Theron Prather : 1970  CSN #: [id number]   Referring Practitioner:  Virginia myers CNP    Referral Date : 19     Date: 2019    Diagnosis: Sprain R medial ankle joint, N01.239N  Treatment Diagnosis: difficulty walking    Onset Date: 18  PT Insurance Information: [facility]  Total # of Visits Approved: 29 Per Physician Order  Total # of Visits to Date: 29  No Show: 0  Canceled Appointment: 2      Pre-Treatment Pain:  4-5/10  Subjective: Pt states R ankle pain 4-5/10 upon arrival this date. Exercises:  Exercise 2: Recum bike 10mins, L4.5, hills  Exercise 3: Towel stretch x3, 30 sec for DF/Inv x3, 30 sec/Eversion x3, 30 sec  Exercise 8: DL heel raises 15x  Exercise 9: Slantboard stretch [id number]  Exercise 11: FSU/LSU 15x 6 inch -- RLE only this date d/t L knee pain  Exercise 12: Sink ex 15x ea  Exercise 13: Foot log roll 3mins  Exercise 18: sideways amb at counter 6 laps PTB   Exercise 19: Cecilia taps 15x (short) ea     Modalities:  Cryotherapy (Minutes\Location): x15min R ankle       Assessment  Assessment: Pt cont to have limited standing tolerance d/t LBP and generalized fatigue. Pt has one appt remaining, which pt may or may not need any additional appt for aquatic therapy to allow pt to get more comfortable with aquatic therapy exercises in order to cont independently with open swim program.     Patient Education  Educated on plan for open swim program.     Pt verbalized/demonstrated good understanding:     [x] Yes         [] No, pt required further clarification.     Post Treatment Pain:  4/10      Plan  Times per week: 2-3  Plan weeks: 4      Goals  (Total # of Visits to Date: 29)   Short Term Goals - Time Frame for Short term goals: 2 weeks

## 2019-04-22 ENCOUNTER — HOSPITAL ENCOUNTER (OUTPATIENT)
Dept: PHYSICAL THERAPY | Age: 49
Setting detail: THERAPIES SERIES
Discharge: HOME | End: 2019-04-22
Payer: MEDICAID

## 2019-04-22 PROCEDURE — 97113 AQUATIC THERAPY/EXERCISES: CPT

## 2019-04-22 ASSESSMENT — PAIN DESCRIPTION - PAIN TYPE: TYPE: CHRONIC PAIN

## 2019-04-22 ASSESSMENT — PAIN SCALES - GENERAL: PAINLEVEL_OUTOF10: 6

## 2019-04-22 ASSESSMENT — PAIN DESCRIPTION - ORIENTATION: ORIENTATION: RIGHT

## 2019-04-22 ASSESSMENT — PAIN DESCRIPTION - LOCATION: LOCATION: ANKLE

## 2019-04-22 NOTE — PROGRESS NOTES
Phone: [phone]                 [facility]           Fax: [phone]                           Outpatient Physical Therapy                                                                            Daily Note    Patient: Viviane Martin : 1970  CSN #: [id number]   Referring Practitioner:  Renata Nelson Salazar myersIRIS    Referral Date : 19     Date: 2019    Diagnosis: Sprain R medial ankle joint, X76.347L  Treatment Diagnosis: difficulty walking    Onset Date: 18  PT Insurance Information: [facility]  Total # of Visits Approved: 29 Per Physician Order  Total # of Visits to Date: 29  No Show: 0  Canceled Appointment: 2      Pre-Treatment Pain:  5/10  Subjective: Pt states R med ankle pain at 6/10 today. Exercises:  Exercise 20: water walking x2 laps each of: forward, retro. side stepping, sink ex x15, side lunges x15, FSU/LSU x10 bench ex x15, push blue float down x10, blue bar for abs x10, biking in well x 5 min, hanging in well x5 min, jet massage x5 min. Assessment  Assessment: Pt continues to c/o R med ankle pain which limits exercise tolerance. Pt requests to continue with aquatics at this time to allow her to become more comfortable with aquatic exercises to transtition to open swim. Patient Education  Educated to pt continue with HEP of Ankle ROM and stretching. Pt verbalized/demonstrated good understanding:     [x] Yes         [] No, pt required further clarification. Post Treatment Pain:  6-7/10      Plan  Times per week: 2-3  Plan weeks: 4      Goals  (Total # of Visits to Date: 34)   Short Term Goals - Time Frame for Short term goals: 2 weeks    Short term goal 1: Initiate HEP and progress as tolerated for strength and ROM. -MET                                        [x]Met   []Partially met  []Not met   Short term goal 2: Pt will report less than 4/10 pain in the R ankle to improve tolerance to strengthening for improved control of the

## 2019-04-26 ENCOUNTER — HOSPITAL ENCOUNTER (OUTPATIENT)
Dept: PHYSICAL THERAPY | Age: 49
Setting detail: THERAPIES SERIES
Discharge: HOME | End: 2019-04-26
Payer: MEDICAID

## 2019-04-26 PROCEDURE — 97113 AQUATIC THERAPY/EXERCISES: CPT

## 2019-04-26 ASSESSMENT — PAIN DESCRIPTION - DESCRIPTORS: DESCRIPTORS: ACHING;SHARP

## 2019-04-26 ASSESSMENT — PAIN SCALES - GENERAL: PAINLEVEL_OUTOF10: 4

## 2019-04-26 ASSESSMENT — PAIN DESCRIPTION - ORIENTATION: ORIENTATION: RIGHT

## 2019-04-26 ASSESSMENT — PAIN DESCRIPTION - LOCATION: LOCATION: ANKLE

## 2019-04-26 NOTE — PROGRESS NOTES
Phone: [phone]                 [facility]           Fax: [phone]                           Outpatient Physical Therapy                                                                            Daily Note    Patient: Josefa Mcardle : 1970  CSN #: [id number]   Referring Practitioner:  Franky Alarcon. Salazar myersIRIS    Referral Date : 19     Date: 2019    Diagnosis: Sprain R medial ankle joint, S93.421A       Onset Date: 18  PT Insurance Information: [facility]  Total # of Visits Approved: 30 Per Physician Order  Total # of Visits to Date: 30  No Show: 0  Canceled Appointment: 2      Pre-Treatment Pain:  4/10  Subjective: Pt arrived 5 min late to appt today d/t \"not being able to find inhaler. \" Pt reports 4/10 pain in R arch of the foot. Pt states pain begins to increase when bearfoot, d/t not having arch support. Pt stated she did take 2 Tylenol prior to tx. Exercises:  Exercise 20: water walking x2 laps each of: forward, retro. side stepping, sink ex x15, side lunges x15, FSU/LSU x10 bench ex x15, push blue float down x10, ant lunge with pink board x10 blue bar for abs x10, biking in well x 5 min, hanging in well x5 min, jet massage x5 min. Assessment  Assessment: Pt had fair tolerance to tx today. Pt reports no increase in pain with tx. Added ant lunges with pink board with one LOB although pt able to self correct. Pt had fair tolerance with added exercise. Patient Education  Avoiding excessive inversion with amb without orthotic. Pt verbalized/demonstrated good understanding:     [x] Yes         [] No, pt required further clarification. Post Treatment Pain:  3/10      Plan  Times per week: 2-3  Plan weeks: 4      Goals  (Total # of Visits to Date: 27)   Short Term Goals - Time Frame for Short term goals: 2 weeks    Short term goal 1: Initiate HEP and progress as tolerated for strength and ROM. -MET                                        [x]Met []Partially met  []Not met   Short term goal 2: Pt will report less than 4/10 pain in the R ankle to improve tolerance to strengthening for improved control of the ankle.-progressing  []Met   [x]Partially met  []Not met   Short term goal 3: Pt will be able to move the R ankle throughout ROM without complaints of pain. -progressing  []Met   [x]Partially met  []Not met      []Met   []Partially met  []Not met     Long Term Goals - Time Frame for Long term goals : 4 weeks  Long term goal 1: Pt will be independent and compliant with her HEP for stability of the ankle for MRADLs- met/cont [x]Met  []Partially met  []Not met   Long term goal 2: Pt will report pain as controlled and no greater than 2/10 in the R ankle to improve tolerance to ADLs- not met []Met  []Partially met  [x]Not met   Long term goal 3: Pt strength in the R ankle will be functional and she will be able to do 10 consecutive heel raises for functional strength to push off for gait. - met [x]Met  []Partially met  []Not met   Long term goal 4: Pt will be out of the cam boot and in a normal shoe for ambulation community distances.-met [x]Met  []Partially met  []Not met     []Met  []Partially met  []Not met       Minutes Tracking:  Time In: [address] [facility]  Time Out: [address]  Minutes: [address] [facility]     Date: 4/26/2019

## 2019-05-01 ENCOUNTER — HOSPITAL ENCOUNTER (OUTPATIENT)
Dept: PHYSICAL THERAPY | Age: 49
Setting detail: THERAPIES SERIES
Discharge: HOME | End: 2019-05-01
Payer: MEDICAID

## 2019-05-01 PROCEDURE — 97113 AQUATIC THERAPY/EXERCISES: CPT

## 2019-05-01 NOTE — PROGRESS NOTES
Phone: [phone]                 [facility]           Fax: [phone]                           Outpatient Physical Therapy                                                                            Daily Note    Patient: Almas Wyman : 1970  CSN #: [id number]   Referring Practitioner:  Cate Gerco. Salazar myersIRIS    Referral Date : 19     Date: 2019    Diagnosis: Sprain R medial ankle joint, H57.104M  Treatment Diagnosis: difficulty walking    Onset Date: 18  PT Insurance Information: [facility]  Total # of Visits Approved: 30 Per Physician Order  Total # of Visits to Date: 32  No Show: 0  Canceled Appointment: 2      Pre-Treatment Pain:  5/10  Subjective: Pt arrived to session rating pain at 5/10 \"across top of R ankle at ankle crease\" with increased soreness noted throughout that she believes is from the weather. Pt reporting she took Tylenol prior to tx today. Exercises:  Exercise 1: **HEP: ankle ABCs/towel stretch/sitting heel raises and toe raises  Exercise 20: Water walking x2 laps ea of: Fwd, retro, side stepping, sink ex x15, side lunges x15, FSU/LSU x10 bench ex x15, push blue float down x10, ant lunge with pink board x10 blue bar for abs x10, biking in well x 5 min, hanging in well x5 min, jet massage x5 min. Assessment  Assessment: Pt having no complaints of increased pain throughout session, pain rating at 5-6/10 in R ankle at end of session (pt stating aching more than pain). Pt will be placed on 2 week hold as of this date, education given on 2 week hold as well as continuing HEP and pt option for open swim and open gym. Pt showing increased independence with aquatic program this date - assisted pt by showing/explaining exercises on whiteboard that she performs and can perform every time she comes into pool area for open swim. Will contact pt in 2 weeks regarding current status.     Patient Education  Pt educated on exercise rationale, continuing HEP, open swim and open gym, as well as 2 week hold. Pt verbalized/demonstrated good understanding:     [x] Yes         [] No, pt required further clarification. Post Treatment Pain:  5-6/10      Plan  Times per week: 2-3  Plan weeks: 4      Goals  (Total # of Visits to Date: 32)   Short Term Goals - Time Frame for Short term goals: 2 weeks    Short term goal 1: Initiate HEP and progress as tolerated for strength and ROM. -MET                                        [x]Met  []Partially met  []Not met   Short term goal 2: Pt will report less than 4/10 pain in the R ankle to improve tolerance to strengthening for improved control of the ankle. -PROGRESSING  []Met  [x]Partially met  []Not met   Short term goal 3: Pt will be able to move the R ankle throughout ROM without complaints of pain. -PROGRESSING  []Met  [x]Partially met  []Not met      []Met   []Partially met  []Not met     Long Term Goals - Time Frame for Long term goals : 4 weeks  Long term goal 1: Pt will be independent and compliant with her HEP for stability of the ankle for MRADLs -MET/CONT [x]Met  []Partially met  []Not met   Long term goal 2: Pt will report pain as controlled and no greater than 2/10 in the R ankle to improve tolerance to ADLs -NOT MET []Met  []Partially met  [x]Not met   Long term goal 3: Pt strength in the R ankle will be functional and she will be able to do 10 consecutive heel raises for functional strength to push off for gait. -MET [x]Met  []Partially met  []Not met   Long term goal 4: Pt will be out of the cam boot and in a normal shoe for ambulation community distances.  -MET [x]Met  []Partially met  []Not met     []Met  []Partially met  []Not met       Minutes Tracking:  Time In: 0815  Time Out: 0900  Minutes: [address] Mahesh Espinal, PTA       Date: 5/1/2019

## 2019-05-14 NOTE — DISCHARGE SUMMARY
Phone: Sharmin          Fax: [phone]                            Outpatient Physical Therapy                                                                    Discharge Summary    Patient: Jennifer Muñoz  : 1970  [facility] #: [id number]   Referring physician: No admitting provider for patient encounter. Referring Practitioner: Ger Hamilton. IRIS Staley      Diagnosis: Sprain R medial ankle joint, S93.421A      Date Treatment Initiated: 19  Date of Last Treatment: 19      PT Visit Information  Onset Date: 18  PT Insurance Information: [facility]  Total # of Visits Approved: 30  Total # of Visits to Date: 32  Plan of Care/Certification Expiration Date: 19  No Show: 0  Canceled Appointment: 2      Frequency/Duration  2-3 times per week  4 weeks      Treatment Received  [x] HP/CP      [x] Electrical Stim   [x] Therapeutic Exercise      [] Gait Training  [x] Aquatics   [x] Ultrasound         [x] Patient Education/HEP   [x] Manual Therapy  [] Traction    [x] Neuro-juan carlos        [x] Soft Tissue Mobs            [] Home TENS  [] Iontophoresis    [] Orthotic casting/fitting      [] Dry Needling    Assessment  Assessment: Pt has completed 31 PT visits to date and was placed on hold after last formal therapy session on 19. At that time, pt was in agreement with cont of open swim program for continuation of exercises and demonstrated independence with aquatic therapy program. Pt has not voiced any questions/concerns since that time; therefore will now D/C from PT. Pt has not met all established goals d/t continued c/o pain, which did not respond to land-based or aquatic based therapy. Goals  Short term goals  Time Frame for Short term goals: 2 weeks  Short term goal 1: Initiate HEP and progress as tolerated for strength and ROM. -MET  Short term goal 2: Pt will report less than 4/10 pain in the R ankle to improve tolerance to strengthening for improved control of the ankle. -PROGRESSING  Short term goal 3: Pt will be able to move the R ankle throughout ROM without complaints of pain. -PROGRESSING    Long term goals  Time Frame for Long term goals : 4 weeks  Long term goal 1: Pt will be independent and compliant with her HEP for stability of the ankle for MRADLs -MET/CONT  Long term goal 2: Pt will report pain as controlled and no greater than 2/10 in the R ankle to improve tolerance to ADLs -NOT MET  Long term goal 3: Pt strength in the R ankle will be functional and she will be able to do 10 consecutive heel raises for functional strength to push off for gait. -MET  Long term goal 4: Pt will be out of the cam boot and in a normal shoe for ambulation community distances.  -MET      Reason for Discharge  [] Goals Achieved                        []  Poor Follow Through/Attendance                  [x]  Optimal Function Achieved     []  Patient Discharged Self    []  Hospitalization                         []  Physician discharge      Thank you for this referral      Wilfred Andersen PT, DPT               Date: 5/14/2019

## 2019-05-15 ENCOUNTER — HOSPITAL ENCOUNTER (OUTPATIENT)
Dept: PHYSICAL THERAPY | Age: 49
Setting detail: THERAPIES SERIES
Discharge: HOME | End: 2019-05-15
Payer: MEDICAID

## 2019-05-15 PROCEDURE — 97162 PT EVAL MOD COMPLEX 30 MIN: CPT

## 2019-05-15 PROCEDURE — 97140 MANUAL THERAPY 1/> REGIONS: CPT

## 2019-05-21 NOTE — PROGRESS NOTES
Phone: [address]          Fax: [phone]                      Outpatient Physical Therapy                                                                      Evaluation  Date: 2019  Patient: Travis Hardin  : 1970  [facility] #: [id number]  Referring Practitioner: Dr. Kiser Lipoma     Referral Date : 19     Diagnosis: Lymphedema I89.0    Treatment Diagnosis: BLE lymphedema   Onset Date: 19  PT Insurance Information: [employer]   Total # of Visits Approved: 18   Total # of Visits to Date: 1  No Show: 0  Canceled Appointment: 0     Subjective  Subjective: Pt has a history of bilateral LE lymphedema. Pt has had bariatric surgery along with plastic surgery to reduce weight and bilateral LE lymphedema. Objective     Observation/Palpation  Edema: R Le midfoot 23.5cm, ankle 29cm, 3\" above 33cm, 6\" above 42.1cm, midcalf 63.5cm, knee 83cm, midthigh 91.2cm; L LE midfoot 24.6cm, ankle 29.2cm, 3\" above 37.9cm, 6\" above 46.2cm, midcalf 60.8cm, knee 87cm, midthigh 100.4cm      Assessment  Assessment: Pt is a 53year old with a history of BLE edema and obesity. Pt had baractric surgery along with surgery to remove lymph nodels on BLE. Pt will benefit from skileld PT for continued treatment of BLE lymphedema   Prognosis: Fair        Decision Making: Medium Complexity    Patient Education  *Patient Education: pt educated onher POC and HEP   Pt verbalized/demonstrated good understanding:     [X] Yes         [] No, pt required further clarification.       Goals  Short term goals  Time Frame for Short term goals: 3 weeks  Short term goal 1: Pt will be educated on her POC and HEP  Short term goal 2: Pt will initiate pump protocol in order to reduce BLE lymphedema     Long term goals  Time Frame for Long term goals : 6 weeks  Long term goal 1: Pt will be safe and independent with compression, elevation and exercises  Long term goal 2: Pt will decrease BLe

## 2019-05-21 NOTE — PLAN OF CARE
[facility]           Phone: [phone]             Outpatient Physical Therapy  Fax: [phone]                                           Date: 2019  Patient: Marco Woods : 1970 [facility] #: [id number]   Referring Practitioner:  Dr. Yordan Oreilly  Referral Date:  19       [x] Plan of Care   [] Updated Plan of Care    Dates of Service to Include: 5/15/2019 to 19    Diagnosis:  Lymphedema I89.0    Rehab (Treatment) Diagnosis:  BLE lymphedema              Onset Date:  19    Attendance  Total # of Visits to Date: 1 No Show: 0 Canceled Appointment: 0    Assessment  Assessment: Pt is a 53year old with a history of BLE edema and obesity. Pt had baractric surgery along with surgery to remove lymph nodels on BLE.  Pt will benefit from skileld PT for continued treatment of BLE lymphedema       Goals  Short term goals  Time Frame for Short term goals: 3 weeks  Short term goal 1: Pt will be educated on her POC and HEP  Short term goal 2: Pt will initiate pump protocol in order to reduce BLE lymphedema   Long term goals  Time Frame for Long term goals : 6 weeks  Long term goal 1: Pt will be safe and independent with compression, elevation and exercises  Long term goal 2: Pt will decrease BLe edema by 5-7cm in order to reduce difficulty with ambulation  Long term goal 3: Pt will report 50% improvement in symptoms  Long term goal 4: Pt will be fitted for pump and compression garament to maintain edema at home      Prognosis  Prognosis: Fair    Treatment Plan   Times per week: 2x/wk   Plan weeks: 4-6 weeks   [] HP/CP      [] Electrical Stim   [x] Therapeutic Exercise      [] Gait Training  [] Aquatics   [] Ultrasound         [x] Patient Education/HEP   [x] Manual Therapy  [] Traction    [] Neuro-juan carlos        [] Soft Tissue Mobs            [] Home TENS  [] Iontophoresis    [] Orthotic casting/fitting      [x] lymphedema management             Electronically signed by: Gabe Elder PT, DPT    Date: 5/21/2019      ______________________________________ Date: 5/21/2019   Physician Signature

## 2019-05-24 ENCOUNTER — HOSPITAL ENCOUNTER (OUTPATIENT)
Dept: PHYSICAL THERAPY | Age: 49
Setting detail: THERAPIES SERIES
Discharge: HOME | End: 2019-05-24
Payer: MEDICAID

## 2019-05-24 PROCEDURE — 97110 THERAPEUTIC EXERCISES: CPT

## 2019-05-24 PROCEDURE — 97140 MANUAL THERAPY 1/> REGIONS: CPT

## 2019-05-24 ASSESSMENT — PAIN SCALES - GENERAL: PAINLEVEL_OUTOF10: 4

## 2019-05-24 NOTE — PROGRESS NOTES
Phone: [phone]                 [facility]           Fax: [phone]                           Outpatient Physical Therapy                                                                            Daily Note    Patient: Theron Prather : 1970  [facility] #: [id number]   Referring Practitioner:  Dr. Anai Maynard     Referral Date : 19     Date: 2019    Diagnosis: Lymphedema I89.0  Treatment Diagnosis: BLE lymphedema     Onset Date: 18  PT Insurance Information: [facility]  Total # of Visits Approved: 30 Per Physician Order  Total # of Visits to Date: 32  No Show: 0  Canceled Appointment: 3      Pre-Treatment Pain:  4/10  Subjective: Pt. reports B knees in pain today stating \" I pushed myself to do more toady and my knees are hurting today. \"    Exercises:  Exercise 1: pt educated on keeping legs elevated, compressed and performing ankle pumps daily     Manual:  Other: MLD to B LE     Modalities:          Assessment  Assessment: Pt. was able to tolertate 60 minutes of compression pumps at 45mmHg with extendors on due to B LE size. Patient Education  Patient Education: pt educated onher POC and HEP   Pt verbalized/demonstrated good understanding:     [x] Yes         [] No, pt required further clarification.     Post Treatment Pain:  2/10      Plan  Times per week: 2x/wk   Plan weeks: 4-6 weeks       Goals  (Total # of Visits to Date: 28)   Short Term Goals - Time Frame for Short term goals: 3 weeks     Short term goal 1: Pt will be educated on her POC and HEP                                        []Met   []Partially met  []Not met   Short term goal 2: Pt will initiate pump protocol in order to reduce BLE lymphedema   []Met   []Partially met  []Not met      []Met   []Partially met  []Not met      []Met   []Partially met  []Not met     Long Term Goals - Time Frame for Long term goals : 6 weeks  Long term goal 1: Pt will be safe and independent with compression, elevation and

## 2019-05-29 ENCOUNTER — HOSPITAL ENCOUNTER (OUTPATIENT)
Dept: PHYSICAL THERAPY | Age: 49
Setting detail: THERAPIES SERIES
Discharge: HOME | End: 2019-05-29
Payer: MEDICAID

## 2019-05-29 PROCEDURE — 97140 MANUAL THERAPY 1/> REGIONS: CPT

## 2019-05-29 PROCEDURE — 97110 THERAPEUTIC EXERCISES: CPT

## 2019-05-29 NOTE — PROGRESS NOTES
Phone: [phone]                 [facility]           Fax: [phone]                           Outpatient Physical Therapy                                                                            Daily Note    Patient: Leslie Chase : 1970  [facility] #: [id number]   Referring Practitioner:  Dr. José Manuel Stapleton     Referral Date : 19     Date: 2019    Diagnosis: Lymphedema I89.0  Treatment Diagnosis: BLE lymphedema     Onset Date: 18  PT Insurance Information: [facility]  Total # of Visits Approved: 30 Per Physician Order  Total # of Visits to Date: 35  No Show: 0  Canceled Appointment: 3      Pre-Treatment Pain:  0/10  Subjective: Pt. reports last treatment going well and after she felt she was walking better than when she came in. Pt. reports no pain at this time and has no compalints. Exercises:  Exercise 1: pt educated on keeping legs elevated, compressed and performing ankle pumps daily     Manual:  Other: MLD to B LE     Modalities:        Assessment  Assessment: Pt. tolerates 60 minutes of compression pumps at 55mmHg with extendors on, due to B LE girth. Patient Education  Patient Education: pt educated onher POC and HEP   Pt verbalized/demonstrated good understanding:     [x] Yes         [] No, pt required further clarification.     Post Treatment Pain:  0/10      Plan  Times per week: 2x/wk   Plan weeks: 4-6 weeks       Goals  (Total # of Visits to Date: 35)   Short Term Goals - Time Frame for Short term goals: 3 weeks     Short term goal 1: Pt will be educated on her POC and HEP-MET                                        []Met   []Partially met  []Not met   Short term goal 2: Pt will initiate pump protocol in order to reduce BLE lymphedema   []Met   []Partially met  []Not met      []Met   []Partially met  []Not met      []Met   []Partially met  []Not met     Long Term Goals - Time Frame for Long term goals : 6 weeks  Long term goal 1: Pt will be safe and independent with compression, elevation and exercises []Met  []Partially met  []Not met   Long term goal 2: Pt will decrease BLe edema by 5-7cm in order to reduce difficulty with ambulation []Met  []Partially met  []Not met   Long term goal 3: Pt will report 50% improvement in symptoms []Met  []Partially met  []Not met   Long term goal 4: Pt will be fitted for pump and compression garament to maintain edema at home  []Met  []Partially met  []Not met     []Met  []Partially met  []Not met       Minutes Tracking:  Time In: 7720  Time Out: [address]  Minutes: 75    Telma CoderPTA     Date: 5/29/2019

## 2019-05-31 ENCOUNTER — HOSPITAL ENCOUNTER (OUTPATIENT)
Dept: PHYSICAL THERAPY | Age: 49
Setting detail: THERAPIES SERIES
Discharge: HOME | End: 2019-05-31
Payer: MEDICAID

## 2019-05-31 PROCEDURE — 97110 THERAPEUTIC EXERCISES: CPT

## 2019-05-31 PROCEDURE — 97140 MANUAL THERAPY 1/> REGIONS: CPT

## 2019-05-31 NOTE — PROGRESS NOTES
Phone: [phone]                 [facility]           Fax: [phone]                           Outpatient Physical Therapy                                                                            Daily Note    Patient: Gia Leyva : 1970  [facility] #: [id number]   Referring Practitioner:  Dr. Dereck Wright     Referral Date : 19     Date: 2019    Diagnosis: Lymphedema I89.0  Treatment Diagnosis: BLE lymphedema     Onset Date: 19  PT Insurance Information: [facility]  Total # of Visits Approved: 18 Per Physician Order  Total # of Visits to Date: 4  No Show: 0  Canceled Appointment: 1      Pre-Treatment Pain:  0/10  Subjective: Pt reported she is realyl feeling better with the pumps     Exercises:  Exercise 1: pt educated on keeping legs elevated, compressed and performing ankle pumps daily   Exercise 2: .  Exercise 3: .  Exercise 4: .  Exercise 5: .  Exercise 6: .  Exercise 7: .  Exercise 8: .  Exercise 9: .  Exercise 10: Neel Brett Exercise 11: .  Exercise 12: Neel Brett Exercise 13: Neel Brett Exercise 14: Neel Brett Exercise 15: . Exercise 16: .  Exercise 17: .  Exercise 18: Neel Brett Exercise 19: .  Exercise 20: . Assessment  Assessment: Tolerated pump at 60mmHg and MLD BLE     Patient Education  *Patient Education: reviewed exercises   Pt verbalized/demonstrated good understanding:     [x] Yes         [] No, pt required further clarification.     Post Treatment Pain:  0/10      Plan  Times per week: 2x/wk   Plan weeks: 4-6 weeks       Goals  (Total # of Visits to Date: 4)   Short Term Goals - Time Frame for Short term goals: 3 weeks    Short term goal 1: Pt will be educated on her POC and HEP-MET                                        []Met   []Partially met  []Not met   Short term goal 2: Pt will initiate pump protocol in order to reduce BLE lymphedema   []Met   []Partially met  []Not met   Short term goal 3: .  []Met   []Partially met  []Not met      []Met   []Partially met  []Not met     Long Term Goals - Time Frame for Long term goals : 6 weeks  Long term goal 1: Pt will be safe and independent with compression, elevation and exercises []Met  []Partially met  []Not met   Long term goal 2: Pt will decrease BLe edema by 5-7cm in order to reduce difficulty with ambulation []Met  []Partially met  []Not met   Long term goal 3: Pt will report 50% improvement in symptoms []Met  []Partially met  []Not met   Long term goal 4: Pt will be fitted for pump and compression garament to maintain edema at home  []Met  []Partially met  []Not met     []Met  []Partially met  []Not met       Minutes Tracking:  Time In: 1309  Time Out: [address] 984  Minutes: 73    Courtney Sosa PT, DPT      Date: 5/31/2019

## 2019-06-04 ENCOUNTER — HOSPITAL ENCOUNTER (OUTPATIENT)
Dept: PHYSICAL THERAPY | Age: 49
Setting detail: THERAPIES SERIES
Discharge: HOME | End: 2019-06-04
Payer: MEDICAID

## 2019-06-04 PROCEDURE — 97110 THERAPEUTIC EXERCISES: CPT

## 2019-06-04 PROCEDURE — 97140 MANUAL THERAPY 1/> REGIONS: CPT

## 2019-06-04 NOTE — PROGRESS NOTES
Phone: [phone]                 [facility]           Fax: [phone]                           Outpatient Physical Therapy                                                                            Daily Note    Patient: Jennifer Muñoz : 1970  [facility] #: [id number]   Referring Practitioner:  Dr. Jeanette Pulido     Referral Date : 19     Date: 2019    Diagnosis: Lymphedema I89.0  Treatment Diagnosis: BLE lymphedema     Onset Date: 19  PT Insurance Information: [facility]  Total # of Visits Approved: 18 Per Physician Order  Total # of Visits to Date: 5  No Show: 0  Canceled Appointment: 1      Pre-Treatment Pain: 0/10  Subjective: Pt reports she was feeling good for a few days and then ended up swelling back up over the weekend     Exercises:  Exercise 1: pt educated on keeping legs elevated, compressed and performing ankle pumps daily     Manual:  Other: MLD to B LE     Assessment  Assessment: MLD perform to BLE tohelp reduced swelling and soften skin. Tolerated pumps at 60mmHg    Patient Education  *Patient Education: educating on increasing compression   Pt verbalized/demonstrated good understanding:     [x] Yes         [] No, pt required further clarification.     Post Treatment Pain:  0/10      Plan  Times per week: 2x/wk   Plan weeks: 4-6 weeks       Goals  (Total # of Visits to Date: 5)   Short Term Goals - Time Frame for Short term goals: 3 weeks  Short term goal 1: Pt will be educated on her POC and HEP-MET                                        []Met   []Partially met  []Not met   Short term goal 2: Pt will initiate pump protocol in order to reduce BLE lymphedema   []Met   []Partially met  []Not met      []Met   []Partially met  []Not met      []Met   []Partially met  []Not met     Long Term Goals - Time Frame for Long term goals : 6 weeks  Long term goal 1: Pt will be safe and independent with compression, elevation and exercises []Met  []Partially met  []Not met   Long term goal 2: Pt will decrease BLe edema by 5-7cm in order to reduce difficulty with ambulation []Met  []Partially met  []Not met   Long term goal 3: Pt will report 50% improvement in symptoms []Met  []Partially met  []Not met     []Met  []Partially met  []Not met     []Met  []Partially met  []Not met       Minutes Tracking:  Time In: 1243  Time Out: [address]  Minutes: 79    Elvia Max PT, DPT      Date: 6/4/2019

## 2019-06-07 ENCOUNTER — HOSPITAL ENCOUNTER (OUTPATIENT)
Dept: PHYSICAL THERAPY | Age: 49
Setting detail: THERAPIES SERIES
Discharge: HOME | End: 2019-06-07
Payer: MEDICAID

## 2019-06-07 PROCEDURE — 97140 MANUAL THERAPY 1/> REGIONS: CPT

## 2019-06-07 PROCEDURE — 97110 THERAPEUTIC EXERCISES: CPT

## 2019-06-07 NOTE — PROGRESS NOTES
Phone: [phone]                 [facility]           Fax: [phone]                           Outpatient Physical Therapy                                                                            Daily Note    Patient: Minesh Comer : 1970  [facility] #: [id number]   Referring Practitioner:  Dr. Lisseth Echevarria     Referral Date : 19     Date: 2019    Diagnosis: Lymphedema I89.0  Treatment Diagnosis: BLE lymphedema     Onset Date: 19  PT Insurance Information: [facility]  Total # of Visits Approved: 18 Per Physician Order  Total # of Visits to Date: 6  No Show: 0  Canceled Appointment: 1      Pre-Treatment Pain:  0/10  Subjective: Pt reported her legs feel very heavy today     Exercises:  Exercise 1: pt educated on keeping legs elevated, compressed and performing ankle pumps daily     Manual:  Other: MLD to B LE       Assessment  Assessment: Pt is progressing slowly with pumps. MLD performed bilaterally, per patient she does not want wrapped     Patient Education  *Patient Education: continuing to exercise   Pt verbalized/demonstrated good understanding:     [x] Yes         [] No, pt required further clarification.     Post Treatment Pain:  0/10      Plan  Times per week: 2x/wk   Plan weeks: 4-6 weeks       Goals  (Total # of Visits to Date: 6)   Short Term Goals - Time Frame for Short term goals: 3 weeks    Short term goal 1: Pt will be educated on her POC and HEP-MET                                        []Met   []Partially met  []Not met   Short term goal 2: Pt will initiate pump protocol in order to reduce BLE lymphedema -met  []Met   []Partially met  []Not met      []Met   []Partially met  []Not met      []Met   []Partially met  []Not met     Long Term Goals - Time Frame for Long term goals : 6 weeks  Long term goal 1: Pt will be safe and independent with compression, elevation and exercises []Met  []Partially met  []Not met   Long term goal 2: Pt will decrease BLe edema by 5-7cm in order to reduce difficulty with ambulation []Met  []Partially met  []Not met   Long term goal 3: Pt will report 50% improvement in symptoms []Met  []Partially met  []Not met   Long term goal 4: Pt will be fitted for pump and compression garament to maintain edema at home  []Met  []Partially met  []Not met     []Met  []Partially met  []Not met       Minutes Tracking:  Time In: 1308  Time Out: [address]  Minutes: 1200 [facility] PT, DPT     Date: 6/7/2019

## 2019-06-11 ENCOUNTER — HOSPITAL ENCOUNTER (OUTPATIENT)
Dept: PHYSICAL THERAPY | Age: 49
Setting detail: THERAPIES SERIES
Discharge: HOME | End: 2019-06-11
Payer: MEDICAID

## 2019-06-11 PROCEDURE — 97140 MANUAL THERAPY 1/> REGIONS: CPT

## 2019-06-11 PROCEDURE — 97110 THERAPEUTIC EXERCISES: CPT

## 2019-06-11 NOTE — PROGRESS NOTES
Phone: [phone]                 [facility]           Fax: [phone]                           Outpatient Physical Therapy                                                                            Daily Note    Patient: Royal Loyola : 1970  [facility] #: [id number]   Referring Practitioner:  Dr. Rebecca Luna     Referral Date : 19     Date: 2019    Diagnosis: Lymphedema I89.0       Onset Date: 19  PT Insurance Information: [facility]  Total # of Visits Approved: 18 Per Physician Order  Total # of Visits to Date: 7  No Show: 0  Canceled Appointment: 1      Pre-Treatment Pain:  0/10  Subjective: Pt. reports having raised spots on arms and itchy, [place] was put around spots to make sure they did not spread during treatment time. Pt. denies any pain at this time. Exercises:  Exercise 1: pt educated on keeping legs elevated, compressed and performing ankle pumps daily     Manual:  Other: MLD to B LE     Modalities:          Assessment  Assessment: Pt is progressing slowly with pumps. MLD performed bilaterally. Pt. ambualted to therapy from car today with no AD and stated she is trying to use her SC less. Patient Education  Patient Education: continuing to exercise   Pt verbalized/demonstrated good understanding:     [x] Yes         [] No, pt required further clarification.     Post Treatment Pain:  0/10      Plan  Times per week: 2x/wk   Plan weeks: 4-6 weeks       Goals  (Total # of Visits to Date: 7)   Short Term Goals - Time Frame for Short term goals: 3 weeks     Short term goal 1: Pt will be educated on her POC and HEP-MET                                        []Met   []Partially met  []Not met   Short term goal 2: Pt will initiate pump protocol in order to reduce BLE lymphedema -met  []Met   []Partially met  []Not met      []Met   []Partially met  []Not met      []Met   []Partially met  []Not met     Long Term Goals - Time Frame for Long term goals : 6 weeks  Long term goal 1: Pt will be safe and independent with compression, elevation and exercises []Met  []Partially met  []Not met   Long term goal 2: Pt will decrease BLe edema by 5-7cm in order to reduce difficulty with ambulation []Met  []Partially met  []Not met   Long term goal 3: Pt will report 50% improvement in symptoms []Met  []Partially met  []Not met   Long term goal 4: Pt will be fitted for pump and compression garament to maintain edema at home  []Met  []Partially met  []Not met     []Met  []Partially met  []Not met       Minutes Tracking:  Time In: 5471  Time Out: Lynnette  Minutes: 96    Telma CoderPTA     Date: 6/11/2019

## 2019-06-12 ENCOUNTER — HOSPITAL ENCOUNTER (EMERGENCY)
Age: 49
Discharge: HOME | End: 2019-06-12
Payer: MEDICAID

## 2019-06-12 VITALS
DIASTOLIC BLOOD PRESSURE: 85 MMHG | RESPIRATION RATE: 18 BRPM | SYSTOLIC BLOOD PRESSURE: 184 MMHG | OXYGEN SATURATION: 97 % | TEMPERATURE: 98.2 F | HEART RATE: 95 BPM

## 2019-06-12 DIAGNOSIS — T78.40XA ALLERGIC REACTION, INITIAL ENCOUNTER: Primary | ICD-10-CM

## 2019-06-12 PROCEDURE — 96372 THER/PROPH/DIAG INJ SC/IM: CPT

## 2019-06-12 PROCEDURE — 6360000002 HC RX W HCPCS: Performed by: PHYSICIAN ASSISTANT

## 2019-06-12 PROCEDURE — 99282 EMERGENCY DEPT VISIT SF MDM: CPT

## 2019-06-12 RX ORDER — DIPHENHYDRAMINE HYDROCHLORIDE 50 MG/ML
50 INJECTION INTRAMUSCULAR; INTRAVENOUS ONCE
Status: COMPLETED | OUTPATIENT
Start: 2019-06-12 | End: 2019-06-12

## 2019-06-12 RX ORDER — DEXAMETHASONE SODIUM PHOSPHATE 4 MG/ML
4 INJECTION, SOLUTION INTRA-ARTICULAR; INTRALESIONAL; INTRAMUSCULAR; INTRAVENOUS; SOFT TISSUE ONCE
Status: COMPLETED | OUTPATIENT
Start: 2019-06-12 | End: 2019-06-12

## 2019-06-12 RX ADMIN — DIPHENHYDRAMINE HYDROCHLORIDE 50 MG: 50 INJECTION, SOLUTION INTRAMUSCULAR; INTRAVENOUS at 12:55

## 2019-06-12 RX ADMIN — DEXAMETHASONE SODIUM PHOSPHATE 4 MG: 4 INJECTION, SOLUTION INTRAMUSCULAR; INTRAVENOUS at 12:54

## 2019-06-12 ASSESSMENT — PAIN SCALES - GENERAL: PAINLEVEL_OUTOF10: 4

## 2019-06-12 ASSESSMENT — ENCOUNTER SYMPTOMS
SORE THROAT: 0
VOMITING: 0
RHINORRHEA: 0
CONSTIPATION: 0
EYE REDNESS: 0
DIARRHEA: 0
BLOOD IN STOOL: 0
EYE DISCHARGE: 0
BACK PAIN: 0
SHORTNESS OF BREATH: 0
WHEEZING: 0
ABDOMINAL PAIN: 0
CHEST TIGHTNESS: 0
COUGH: 0
NAUSEA: 0

## 2019-06-12 ASSESSMENT — PAIN DESCRIPTION - DESCRIPTORS: DESCRIPTORS: ITCHING

## 2019-06-12 ASSESSMENT — PAIN DESCRIPTION - PAIN TYPE: TYPE: ACUTE PAIN

## 2019-06-12 NOTE — ED PROVIDER NOTES
[facility] ED  eMERGENCY dEPARTMENT eNCOUnter      Pt Name: Gia Leyva  MRN: [id number]  [place] 1970  Date of evaluation: 6/12/2019  Provider: Jackie Gil, [address] [place] [address]     Chief Complaint   Patient presents with    Rash     after having lymphadema therapy yesterday and got a rash,          HISTORY OF PRESENT ILLNESS   (Location/Symptom, Timing/Onset, Context/Setting,Quality, Duration, Modifying Factors, Severity)  Note limiting factors. Gia Leyva is a56 y.o. female who presents to the emergency department with complaints of itchy rash to her mainly her left arm but also on her right forearm which started yesterday. She reports that she was outside walking into the hospital yesterday sitting down waiting for a while before her lymphedema point for her lower legs when she began to itch on her arms. She denies taking any new medications denies any contact with anything that she knows of. Is unsure if she was bit by something while walking in. Reports overnight it just continues to itch. She denies any lesions in her upper arms denies any tongue swelling denies any shortness of breath denies any dizziness. Just reports that it feels very itchy and her home health nurse did not know what it could be so sent her to the ER. She has not taken anything for symptoms. She has no other complaints this time. Denies numbness denies tingling. HPI    Nursing Notes werereviewed. REVIEW OF SYSTEMS    (2-9 systems for level 4, 10 or more for level 5)     Review of Systems   Constitutional: Negative for chills, diaphoresis and fever. HENT: Negative for congestion, ear pain, rhinorrhea and sore throat. Eyes: Negative for discharge, redness and visual disturbance. Respiratory: Negative for cough, chest tightness, shortness of breath and wheezing. Cardiovascular: Negative for chest pain and palpitations.    Gastrointestinal: Negative for abdominal pain, blood in stool, constipation, diarrhea, nausea and vomiting. Endocrine: Negative for polydipsia, polyphagia and polyuria. Genitourinary: Negative for decreased urine volume, difficulty urinating, dysuria, frequency and hematuria. Musculoskeletal: Negative for arthralgias, back pain and myalgias. Skin: Positive for rash. Negative for pallor. Allergic/Immunologic: Negative for food allergies and immunocompromised state. Neurological: Negative for dizziness, syncope, weakness and light-headedness. Hematological: Negative for adenopathy. Does not bruise/bleed easily. Psychiatric/Behavioral: Negative for behavioral problems and suicidal ideas. The patient is not nervous/anxious. Except as noted above the remainder of the review of systems was reviewed and negative.        PAST MEDICAL HISTORY     Past Medical History:   Diagnosis Date    Anemia 2008    IRON DEFIENCY    Asthma     2011    Cellulitis 2005    KNEES    COPD (chronic obstructive pulmonary disease) ([address] 2011    INHALER DAILY AND PRN    Difficult intravenous access     Fibromyalgia     1997    Gout 2011    H. pylori infection     Hyperlipidemia     Hypertension     PATIENT STATES RESOLVED    Iron deficiency     Low back pain     Lumbago     Lymphedema of lower extremity     Myalgia and myositis, unspecified     Obesity     Osteoarthritis     Pain in joint, lower leg     Sciatica     Seizures ([address] 1995    from [address] Sleep apnea 2010    USES CPAP         SURGICALHISTORY       Past Surgical History:   Procedure Laterality Date    CARDIOVASCULAR STRESS TEST  08/30/2016    CLOSED REDUCTION OF A JOINT Right 09/23/2016    shoulder    MOUTH SURGERY  2005    1 UPPER MOLAR REMOVED    SHOULDER ARTHROSCOPY Right 03/08/2017    with Bicep tenotomy    SHOULDER ARTHROSCOPY Right 3/8/2017    SHOULDER ARTHROSCOPY performed by Rebecca Alarcon DO at [address]  09/19/2016    robotic sleeve (ZANTAC) 300 MG TABLET    Take 1 tablet by mouth nightly    SILVER SULFADIAZINE (SILVADENE) 1 % CREAM    Apply  topically 2 times daily as needed. Apply topically daily.     SPIRONOLACTONE-HYDROCHLOROTHIAZIDE (ALDACTAZIDE) 25-25 MG PER TABLET    TAKE 1 TABLET BY MOUTH ONCE DAILY    TIZANIDINE (ZANAFLEX) 4 MG TABLET    Take 4 mg by mouth as needed    VITAMIN B-1 (THIAMINE) 100 MG TABLET    Take 100 mg by mouth daily       ALLERGIES     Penicillins and Penicillins    FAMILY HISTORY       Family History   Problem Relation Age of Onset    Cancer Mother         uterine    Heart Disease Mother     High Blood Pressure Mother     Stroke Mother     Arthritis Mother     Depression Mother     Stroke Father     High Blood Pressure Father     Diabetes Father     High Cholesterol Father     Sickle Cell Anemia Maternal Aunt         aunt has the trait    Kidney Disease Sister         KIDNEY FAILURE    Kidney Disease Sister         SIDS-2 MONTHS OLD          SOCIAL HISTORY       Social History     Socioeconomic History    Marital status: Single     Spouse name: Not on file    Number of children: Not on file    Years of education: Not on file    Highest education level: Not on file   Occupational History    Not on file   Social Needs    Financial resource strain: Not on file    Food insecurity:     Worry: Not on file     Inability: Not on file    Transportation needs:     Medical: Not on file     Non-medical: Not on file   Tobacco Use    Smoking status: Never Smoker    Smokeless tobacco: Never Used   Substance and Sexual Activity    Alcohol use: No    Drug use: No    Sexual activity: Not on file   Lifestyle    Physical activity:     Days per week: Not on file     Minutes per session: Not on file    Stress: Not on file   Relationships    Social connections:     Talks on phone: Not on file     Gets together: Not on file     Attends Hoahaoism service: Not on file     Active member of club or organization: Not on file     Attends meetings of clubs or organizations: Not on file     Relationship status: Not on file    Intimate partner violence:     Fear of current or ex partner: Not on file     Emotionally abused: Not on file     Physically abused: Not on file     Forced sexual activity: Not on file   Other Topics Concern    Not on file   Social History Narrative    ** Merged History Encounter **            SCREENINGS    Edison Coma Scale  Eye Opening: Spontaneous  Best Verbal Response: Oriented  Best Motor Response: Obeys commands  Deepti Coma Scale Score: 15 @FLOW(17543530)@      PHYSICAL EXAM    (up to 7 for level 4, 8 or more for level 5)     ED Triage Vitals   BP Temp Temp Source Pulse Resp SpO2 Height Weight   06/12/19 1236 06/12/19 1236 06/12/19 1236 06/12/19 1236 06/12/19 1235 06/12/19 1235 -- --   (!) 223/128 98.2 °F (36.8 °C) Tympanic 94 18 97 %         Physical Exam   Constitutional: She is oriented to person, place, and time. She appears well-developed and well-nourished. No distress. She is not intubated. HENT:   Head: Normocephalic and atraumatic. Right Ear: External ear normal.   Left Ear: External ear normal.   Mouth/Throat: Oropharynx is clear and moist. No oropharyngeal exudate. Uvula is midline. There is no airway compromise. No oral lesions. Eyes: Pupils are equal, round, and reactive to light. Conjunctivae and EOM are normal. Right eye exhibits no discharge. Left eye exhibits no discharge. No scleral icterus. Neck: Normal range of motion. Neck supple. No tracheal deviation present. Cardiovascular: Normal rate, regular rhythm and intact distal pulses. Exam reveals no gallop and no friction rub. No murmur heard. Pulmonary/Chest: Effort normal and breath sounds normal. No accessory muscle usage or stridor. No apnea, no tachypnea and no bradypnea. She is not intubated. No respiratory distress. She has no decreased breath sounds. She has no wheezes. She has no rhonchi. She has no rales. Abdominal: There is no guarding. Musculoskeletal: Normal range of motion. She exhibits no edema, tenderness or deformity. Neurological: She is alert and oriented to person, place, and time. She has normal reflexes. She displays normal reflexes. No cranial nerve deficit. She exhibits normal muscle tone. Skin: Skin is warm and dry. Rash noted. She is not diaphoretic. Patient has a blanchable red rash noted to bilateral forearms mainly on the left. There are small little areas that appear to be consistent with a possible bite versus some type of contact dermatitis reaction. There is some minimal swelling of the forearm worse on the left than the right. Compartments are soft. There is no abscess formation there is no skin necrosis. Intact distal pulses sensation cap refill less than 3 seconds. Psychiatric: She has a normal mood and affect. Her behavior is normal.   Nursing note and vitals reviewed. DIAGNOSTIC RESULTS     EKG: All EKG's are interpreted by the Emergency Department Physician who either signs orCo-signs this chart in the absence of a cardiologist.      RADIOLOGY:   Non-plainfilm images such as CT, Ultrasound and MRI are read by the radiologist. Plain radiographic images are visualized and preliminarily interpreted by the emergency physician with the below findings:      Interpretationper the Radiologist below, if available at the time of this note:    No orders to display         ED BEDSIDE ULTRASOUND:   Performed by ED Physician - none    LABS:  Labs Reviewed - No data to display    All other labs were within normal range or not returned as of this dictation.     EMERGENCY DEPARTMENT COURSE and DIFFERENTIAL DIAGNOSIS/MDM:   Vitals:    Vitals:    06/12/19 1235 06/12/19 1236 06/12/19 1320   BP:  (!) 223/128 (!) 184/85   Pulse:  94 95   Resp: 18     Temp:  98.2 °F (36.8 °C)    TempSrc:  Tympanic    SpO2: 97%           MDM  28-year-old female who presents with itchy rash to the left and right forearms. With small areas it appeared to be consistent with a likely bite versus a contact dermatitis. There is no focal abscess. Is not painful to her it is just itchy. Compartments of her arms are soft. At this point I do not think this is a cellulitis especially given its bilateral and it itches it is not painful to her. She is afebrile. I am going to give her Benadryl here and a dose of steroid and reevaluate. Airway is clear. On reevaluation, patient reports she is feeling much better the itching has decreased significantly and she does not feel as swollen. At this point again I do think consideration is be made for cellulitis however I think this is more likely an allergic reaction some type of contact dermatitis. I explained this to the patient. I explained what to watch for going home. She understands that her doctor should recheck this within 24 to 48 hours. She understands that if she develops a fever or redness on her arm worsens if she develops any swelling or fever or any new symptoms she should return immediately to the ER. She verbalized agreement of this plan. Questions have been answered at length. She will otherwise return to the ER with any new or worsening complaints. Procedures    FINAL IMPRESSION      1.  Allergic reaction, initial encounter        DISPOSITION/PLAN   DISPOSITION Decision To Discharge 06/12/2019 01:35:47 PM      PATIENT REFERRED TO:  [facility] ED  [address]  [phone]    If symptoms worsen, As needed    Joel Skaggs MD  [address] [address]  [phone]    Schedule an appointment as soon as possible for a visit in 1 day  For wound re-check      DISCHARGE MEDICATIONS:  New Prescriptions    No medications on file              Summation      Patient Course:      ED Medications administered this visit:    Medications   diphenhydrAMINE (BENADRYL) injection 50 mg (50 mg Intramuscular Given 6/12/19 1255)   dexamethasone (DECADRON) injection 4 mg (4 mg Intramuscular Given 6/12/19 1254)       New Prescriptions from this visit:    New Prescriptions    No medications on file       Follow-up:  [facility] ED  [address] [facility]  [phone]    If symptoms worsen, As needed    Bhumika Frederick MD  [address] [facility] [address] [place] [address]  [phone]    Schedule an appointment as soon as possible for a visit in 1 day  For wound re-check        Final Impression:   1.  Allergic reaction, initial encounter               (Please note that portions of this note were completed with a voice recognition program.  Efforts were made to edit the dictations but occasionally words are mis-transcribed.)           Boris Noble PA-C  06/12/19 8777

## 2019-06-14 ENCOUNTER — HOSPITAL ENCOUNTER (OUTPATIENT)
Dept: PHYSICAL THERAPY | Age: 49
Setting detail: THERAPIES SERIES
Discharge: HOME | End: 2019-06-14
Payer: MEDICAID

## 2019-06-14 PROCEDURE — 97140 MANUAL THERAPY 1/> REGIONS: CPT

## 2019-06-14 PROCEDURE — 97110 THERAPEUTIC EXERCISES: CPT

## 2019-06-14 NOTE — PROGRESS NOTES
Phone: [phone]                 [facility]           Fax: [phone]                           Outpatient Physical Therapy                                                                            Daily Note    Patient: Bernie Catalan : 1970  [facility] #: [id number]   Referring Practitioner:  Dr. Tyler Sullivan     Referral Date : 19     Date: 2019    Diagnosis: Lymphedema I89.0  Treatment Diagnosis: BLE lymphedema     Onset Date: 19  PT Insurance Information: [facility]  Total # of Visits Approved: 18 Per Physician Order  Total # of Visits to Date: 8  No Show: 0  Canceled Appointment: 0      Pre-Treatment Pain:  0/10  Subjective: Pt. reports doing well and has no pain upon arrival. Pt. stated since starting compression pumps, she feels her LE size has decreased and feel she has more ROM and flexinbility. Exercises:  Exercise 1: pt educated on keeping legs elevated, compressed and performing ankle pumps daily     Manual:  Other: MLD to B LE     Modalities:          Assessment  Assessment: Pt is progressing slowly with pumps. MLD performed bilaterally. Pt. is ambulating with no AD. Patient Education  Patient Education: continuing to exercise   Pt verbalized/demonstrated good understanding:     [x] Yes         [] No, pt required further clarification.     Post Treatment Pain:  /10      Plan  Times per week: 2x/wk   Plan weeks: 4-6 weeks       Goals  (Total # of Visits to Date: 8)   Short Term Goals - Time Frame for Short term goals: 3 weeks     Short term goal 1: Pt will be educated on her POC and HEP-MET                                        []Met   []Partially met  []Not met   Short term goal 2: Pt will initiate pump protocol in order to reduce BLE lymphedema -met  []Met   []Partially met  []Not met      []Met   []Partially met  []Not met      []Met   []Partially met  []Not met     Long Term Goals - Time Frame for Long term goals : 6 weeks  Long term goal 1: Pt will be

## 2019-06-18 ENCOUNTER — HOSPITAL ENCOUNTER (OUTPATIENT)
Dept: PHYSICAL THERAPY | Age: 49
Setting detail: THERAPIES SERIES
Discharge: HOME | End: 2019-06-18
Payer: MEDICAID

## 2019-06-18 ENCOUNTER — HOSPITAL ENCOUNTER (OUTPATIENT)
Age: 49
Discharge: HOME | End: 2019-06-18
Payer: MEDICAID

## 2019-06-18 PROCEDURE — 97140 MANUAL THERAPY 1/> REGIONS: CPT

## 2019-06-18 PROCEDURE — 97110 THERAPEUTIC EXERCISES: CPT

## 2019-06-18 NOTE — PROGRESS NOTES
[]Met   []Partially met  []Not met     Long Term Goals - Time Frame for Long term goals : 6 weeks  Long term goal 1: Pt will be safe and independent with compression, elevation and exercises-MET []Met  []Partially met  []Not met   Long term goal 2: Pt will decrease BLe edema by 5-7cm in order to reduce difficulty with ambulation []Met  []Partially met  []Not met   Long term goal 3: Pt will report 50% improvement in symptoms []Met  []Partially met  []Not met   Long term goal 4: Pt will be fitted for pump and compression garament to maintain edema at home  []Met  []Partially met  []Not met     []Met  []Partially met  []Not met       Minutes Tracking:  Time In: 1130  Time Out: 1301  Minutes: 91    Telma CoderPTA     Date: 6/18/2019

## 2019-06-21 ENCOUNTER — HOSPITAL ENCOUNTER (OUTPATIENT)
Dept: PHYSICAL THERAPY | Age: 49
Setting detail: THERAPIES SERIES
Discharge: HOME | End: 2019-06-21
Payer: MEDICAID

## 2019-06-21 PROCEDURE — 97140 MANUAL THERAPY 1/> REGIONS: CPT

## 2019-06-21 PROCEDURE — 97110 THERAPEUTIC EXERCISES: CPT

## 2019-06-21 NOTE — PROGRESS NOTES
will decrease BLe edema by 5-7cm in order to reduce difficulty with ambulation []Met  []Partially met  []Not met   Long term goal 3: Pt will report 50% improvement in symptoms []Met  []Partially met  []Not met   Long term goal 4: Pt will be fitted for pump and compression garament to maintain edema at home -MET []Met  []Partially met  []Not met     []Met  []Partially met  []Not met       Minutes Tracking:  Time In: 1226  Time Out: [phone]  Minutes: [address]     Date: 6/21/2019

## 2019-06-25 ENCOUNTER — HOSPITAL ENCOUNTER (OUTPATIENT)
Dept: PHYSICAL THERAPY | Age: 49
Setting detail: THERAPIES SERIES
Discharge: HOME | End: 2019-06-25
Payer: MEDICAID

## 2019-06-25 PROCEDURE — 97140 MANUAL THERAPY 1/> REGIONS: CPT

## 2019-06-25 PROCEDURE — 97110 THERAPEUTIC EXERCISES: CPT

## 2019-06-27 ENCOUNTER — HOSPITAL ENCOUNTER (OUTPATIENT)
Dept: PHYSICAL THERAPY | Age: 49
Setting detail: THERAPIES SERIES
Discharge: HOME | End: 2019-06-27
Payer: MEDICAID

## 2019-06-27 PROCEDURE — 97110 THERAPEUTIC EXERCISES: CPT

## 2019-06-27 PROCEDURE — 97140 MANUAL THERAPY 1/> REGIONS: CPT

## 2019-06-27 NOTE — PROGRESS NOTES
Phone: [phone]                 [facility]           Fax: [phone]                           Outpatient Physical Therapy                                                                            Daily Note    Patient: Clara Jacobo : 1970  [facility] #: [id number]   Referring Practitioner:  Dr. Lula Allan     Referral Date : 19     Date: 2019    Diagnosis: Lymphedema I89.0  Treatment Diagnosis: BLE lymphedema     Onset Date: 19  PT Insurance Information: [facility]  Total # of Visits Approved: 18 Per Physician Order  Total # of Visits to Date: 11  No Show: 0  Canceled Appointment: 0      Pre-Treatment Pain:  0/10  Subjective: Pt. reports she went to the [address] [place] [address] yesterday with friends and did a lot of walking and still feels good today, just tired. Exercises:  Exercise 1: pt educated on keeping legs elevated, compressed and performing ankle pumps daily     Manual:  Other: MLD to B LE     Modalities:          Assessment  Assessment: Pt. tolerated treatment well with no complaints. Pt. continues to decrease LE girth with pump use. Patient Education  Patient Education: increasing compression   Pt verbalized/demonstrated good understanding:     [x] Yes         [] No, pt required further clarification.     Post Treatment Pain: 0 /10      Plan  Times per week: 2x/wk   Plan weeks: 4-6 weeks       Goals  (Total # of Visits to Date: 6)   Short Term Goals - Time Frame for Short term goals: 3 weeks     Short term goal 1: Pt will be educated on her POC and HEP-MET                                        []Met   []Partially met  []Not met   Short term goal 2: Pt will initiate pump protocol in order to reduce BLE lymphedema -met  []Met   []Partially met  []Not met      []Met   []Partially met  []Not met      []Met   []Partially met  []Not met     Long Term Goals - Time Frame for Long term goals : 6 weeks  Long term goal 1: Pt will be safe and independent with compression, elevation and exercises-MET []Met  []Partially met  []Not met   Long term goal 2: Pt will decrease BLe edema by 5-7cm in order to reduce difficulty with ambulation []Met  []Partially met  []Not met   Long term goal 3: Pt will report 50% improvement in symptoms []Met  []Partially met  []Not met   Long term goal 4: Pt will be fitted for pump and compression garament to maintain edema at home -MET []Met  []Partially met  []Not met     []Met  []Partially met  []Not met       Minutes Tracking:  Time In: 1100  Time Out: 1229  Minutes: 89    Telma CoderPTA     Date: 6/27/2019

## 2019-07-02 ENCOUNTER — HOSPITAL ENCOUNTER (OUTPATIENT)
Dept: PHYSICAL THERAPY | Age: 49
Setting detail: THERAPIES SERIES
Discharge: HOME | End: 2019-07-02
Payer: MEDICAID

## 2019-07-02 PROCEDURE — 97140 MANUAL THERAPY 1/> REGIONS: CPT

## 2019-07-02 PROCEDURE — 97110 THERAPEUTIC EXERCISES: CPT

## 2019-07-02 NOTE — PROGRESS NOTES
Phone: [phone]                 [facility]           Fax: [phone]                           Outpatient Physical Therapy                                                                            Daily Note    Patient: Ada Sal : 1970  CSN #: [id number]   Referring Practitioner:  Dr. Ladi Rosenberg     Referral Date : 19     Date: 2019    Diagnosis: Lymphedema I89.0  Treatment Diagnosis: BLE lymphedema     Onset Date: 19  [facility]  Total # of Visits Approved: 18 Per Physician Order  Total # of Visits to Date: 12  No Show: 0  Canceled Appointment: 0      Pre-Treatment Pain:  0/10  Subjective: Pt. reports feeling more swollen in Le[place] today due it being \"that time of the month. \" Pt. was using cane today to ambulate. Exercises:  Exercise 1: pt educated on keeping legs elevated, compressed and performing ankle pumps daily     Manual:  Other: MLD to B LE     Modalities:          Assessment  Assessment: Pt. tolerated treatment well with no complaints. Pt. continues to decrease LE girth with pump use. Patient Education  Patient Education: increasing compression   Pt verbalized/demonstrated good understanding:     [x] Yes         [] No, pt required further clarification.     Post Treatment Pain:  /10      Plan  Times per week: 2x/wk   Plan weeks: 4-6 weeks       Goals  (Total # of Visits to Date: 15)   Short Term Goals - Time Frame for Short term goals: 3 weeks     Short term goal 1: Pt will be educated on her POC and HEP-MET                                        []Met   []Partially met  []Not met   Short term goal 2: Pt will initiate pump protocol in order to reduce BLE lymphedema -met  []Met   []Partially met  []Not met      []Met   []Partially met  []Not met      []Met   []Partially met  []Not met     Long Term Goals - Time Frame for Long term goals : 6 weeks  Long term goal 1: Pt will be safe and independent with compression, elevation and exercises-MET []Met  []Partially met  []Not met   Long term goal 2: Pt will decrease BLe edema by 5-7cm in order to reduce difficulty with ambulation []Met  []Partially met  []Not met   Long term goal 3: Pt will report 50% improvement in symptoms []Met  []Partially met  []Not met   Long term goal 4: Pt will be fitted for pump and compression garament to maintain edema at home -MET []Met  []Partially met  []Not met     []Met  []Partially met  []Not met       Minutes Tracking:  Time In: 1100  Time Out: 1229  Minutes: 89    Telma CoderPTA     Date: 7/2/2019

## 2019-07-05 ENCOUNTER — HOSPITAL ENCOUNTER (OUTPATIENT)
Dept: PHYSICAL THERAPY | Age: 49
Setting detail: THERAPIES SERIES
Discharge: HOME | End: 2019-07-05
Payer: MEDICAID

## 2019-07-05 PROCEDURE — 97140 MANUAL THERAPY 1/> REGIONS: CPT

## 2019-07-05 PROCEDURE — 97110 THERAPEUTIC EXERCISES: CPT

## 2019-07-05 NOTE — PLAN OF CARE
[facility]           Phone: [phone]             Outpatient Physical Therapy  Fax: [phone]                                           Date: 2019  Patient: Humberto Huang : 1970 [facility] #: [id number]   Referring Practitioner:  Dr. Conchis Simpson  Referral Date:  19       [] Plan of Care   [x] Updated Plan of Care    Dates of Service to Include: 2019 to 19    Diagnosis:  Lymphedema I89.0    Rehab (Treatment) Diagnosis:  BLE lymphedema              Onset Date:  19    Attendance  Total # of Visits to Date: 13 No Show: 0 Canceled Appointment: 0    Assessment  Assessment: Pt tolerated pumps at 65mmHg.  Pt tolerated treatment well and will continue with POC until pumps are received       Goals  Short term goals  Time Frame for Short term goals: 3 weeks  Short term goal 1: Pt will be educated on her POC and HEP-MET  Short term goal 2: Pt will initiate pump protocol in order to reduce BLE lymphedema -met  Long term goals  Time Frame for Long term goals : 6 weeks  Long term goal 1: Pt will be safe and independent with compression, elevation and exercises-MET  Long term goal 2: Pt will decrease BLe edema by 5-7cm in order to reduce difficulty with ambulation  Long term goal 3: Pt will report 50% improvement in symptoms  Long term goal 4: Pt will be fitted for pump and compression garament to maintain edema at home -MET     Prognosis  Prognosis: [address]   Times per week: 2x/wk   Plan weeks: 4-6 weeks   [] HP/CP      [] Electrical Stim   [x] Therapeutic Exercise      [] Gait Training  [] Aquatics   [] Ultrasound         [x] Patient Education/HEP   [x] Manual Therapy  [] Traction    [] Neuro-juan carlos        [] Soft Tissue Mobs            [] Home TENS  [] Iontophoresis    [] Orthotic casting/fitting      [x] lymphedema management              Electronically signed by: Harlan Garcia, PT, DPT

## 2019-07-08 ENCOUNTER — HOSPITAL ENCOUNTER (OUTPATIENT)
Dept: PHYSICAL THERAPY | Age: 49
Setting detail: THERAPIES SERIES
Discharge: HOME | End: 2019-07-08
Payer: MEDICAID

## 2019-07-08 PROCEDURE — 97110 THERAPEUTIC EXERCISES: CPT

## 2019-07-08 PROCEDURE — 97140 MANUAL THERAPY 1/> REGIONS: CPT

## 2019-07-08 NOTE — PROGRESS NOTES
Phone: [phone]                 [facility]           Fax: [phone]                           Outpatient Physical Therapy                                                                            Daily Note    Patient: Ada Sal : 1970  CSN #: [id number]   Referring Practitioner:  Dr. Ladi Rosenberg     Referral Date : 19     Date: 2019    Diagnosis: Lymphedema I89.0  Treatment Diagnosis: BLE lymphedema     Onset Date: 19  PT Insurance Information: [employer]  Total # of Visits Approved: 18 Per Physician Order  Total # of Visits to Date: 14  No Show: 0  Canceled Appointment: 0      Pre-Treatment Pain:  0/10  Subjective: Pt. reports no pain at this time and stated she still has not heard about her compression pumps. Exercises:  Exercise 1: pt educated on keeping legs elevated, compressed and performing ankle pumps daily     Manual:  Other: MLD to B LE     Modalities:          Assessment  Assessment: Pt tolerated pumps at 65mmHg. Pt tolerated treatment well and will continue with POC until pumps are received     Patient Education  Patient Education: continue with POC   Pt verbalized/demonstrated good understanding:     [x] Yes         [] No, pt required further clarification.     Post Treatment Pain:  0/10      Plan  Times per week: 2x/wk   Plan weeks: 4-6 weeks       Goals  (Total # of Visits to Date: 15)   Short Term Goals - Time Frame for Short term goals: 3 weeks     Short term goal 1: Pt will be educated on her POC and HEP-MET                                        []Met   []Partially met  []Not met   Short term goal 2: Pt will initiate pump protocol in order to reduce BLE lymphedema -met  []Met   []Partially met  []Not met      []Met   []Partially met  []Not met      []Met   []Partially met  []Not met     Long Term Goals - Time Frame for Long term goals : 6 weeks  Long term goal 1: Pt will be safe and independent with compression, elevation and exercises-MET

## 2019-07-19 ENCOUNTER — HOSPITAL ENCOUNTER (OUTPATIENT)
Age: 49
Discharge: HOME | End: 2019-07-19
Payer: MEDICAID

## 2019-07-19 ENCOUNTER — HOSPITAL ENCOUNTER (OUTPATIENT)
Dept: PHYSICAL THERAPY | Age: 49
Setting detail: THERAPIES SERIES
Discharge: HOME | End: 2019-07-19
Payer: MEDICAID

## 2019-07-19 DIAGNOSIS — D64.9 NORMOCYTIC ANEMIA: ICD-10-CM

## 2019-07-19 LAB
ABSOLUTE EOS #: <0.03 K/UL (ref 0–0.44)
ABSOLUTE IMMATURE GRANULOCYTE: <0.03 K/UL (ref 0–0.3)
ABSOLUTE LYMPH #: 1.83 K/UL (ref 1.1–3.7)
ABSOLUTE MONO #: 0.57 K/UL (ref 0.1–1.2)
BASOPHILS # BLD: 1 % (ref 0–2)
BASOPHILS ABSOLUTE: 0.04 K/UL (ref 0–0.2)
DIFFERENTIAL TYPE: ABNORMAL
EOSINOPHILS RELATIVE PERCENT: 0 % (ref 1–4)
FERRITIN: 197 UG/L (ref 13–150)
FOLATE: 8.4 NG/ML
HCT VFR BLD CALC: 40.5 % (ref 36.3–47.1)
HEMOGLOBIN: 12.5 G/DL (ref 11.9–15.1)
IMMATURE GRANULOCYTES: 0 %
IRON SATURATION: 15 % (ref 20–55)
IRON: 36 UG/DL (ref 37–145)
LYMPHOCYTES # BLD: 33 % (ref 24–43)
MCH RBC QN AUTO: 28 PG (ref 25.2–33.5)
MCHC RBC AUTO-ENTMCNC: 30.9 G/DL (ref 28.4–34.8)
MCV RBC AUTO: 90.6 FL (ref 82.6–102.9)
MONOCYTES # BLD: 10 % (ref 3–12)
NRBC AUTOMATED: 0 PER 100 WBC
PDW BLD-RTO: 14.5 % (ref 11.8–14.4)
PLATELET # BLD: 271 K/UL (ref 138–453)
PLATELET ESTIMATE: ABNORMAL
PMV BLD AUTO: 10.8 FL (ref 8.1–13.5)
RBC # BLD: 4.47 M/UL (ref 3.95–5.11)
RBC # BLD: ABNORMAL 10*6/UL
SEG NEUTROPHILS: 56 % (ref 36–65)
SEGMENTED NEUTROPHILS ABSOLUTE COUNT: 3.11 K/UL (ref 1.5–8.1)
TOTAL IRON BINDING CAPACITY: 243 UG/DL (ref 250–450)
UNSATURATED IRON BINDING CAPACITY: 207 UG/DL (ref 112–347)
VITAMIN B-12: 532 PG/ML (ref 232–1245)
WBC # BLD: 5.6 K/UL (ref 3.5–11.3)
WBC # BLD: ABNORMAL 10*3/UL

## 2019-07-19 PROCEDURE — 97140 MANUAL THERAPY 1/> REGIONS: CPT

## 2019-07-19 PROCEDURE — 85025 COMPLETE CBC W/AUTO DIFF WBC: CPT

## 2019-07-19 PROCEDURE — 82607 VITAMIN B-12: CPT

## 2019-07-19 PROCEDURE — 82728 ASSAY OF FERRITIN: CPT

## 2019-07-19 PROCEDURE — 83540 ASSAY OF IRON: CPT

## 2019-07-19 PROCEDURE — 97110 THERAPEUTIC EXERCISES: CPT

## 2019-07-19 PROCEDURE — 83550 IRON BINDING TEST: CPT

## 2019-07-19 PROCEDURE — 82746 ASSAY OF FOLIC ACID SERUM: CPT

## 2019-07-19 PROCEDURE — 36415 COLL VENOUS BLD VENIPUNCTURE: CPT

## 2019-07-23 ENCOUNTER — HOSPITAL ENCOUNTER (OUTPATIENT)
Dept: PHYSICAL THERAPY | Age: 49
Setting detail: THERAPIES SERIES
Discharge: HOME | End: 2019-07-23
Payer: MEDICAID

## 2019-07-23 PROCEDURE — 97110 THERAPEUTIC EXERCISES: CPT

## 2019-07-23 PROCEDURE — 97140 MANUAL THERAPY 1/> REGIONS: CPT

## 2019-07-24 ENCOUNTER — OFFICE VISIT (OUTPATIENT)
Dept: ONCOLOGY | Age: 49
End: 2019-07-24
Payer: MEDICAID

## 2019-07-24 VITALS
DIASTOLIC BLOOD PRESSURE: 111 MMHG | WEIGHT: 293 LBS | HEART RATE: 67 BPM | BODY MASS INDEX: 47.09 KG/M2 | SYSTOLIC BLOOD PRESSURE: 189 MMHG | RESPIRATION RATE: 18 BRPM | HEIGHT: 66 IN | TEMPERATURE: 97.2 F

## 2019-07-24 DIAGNOSIS — D64.9 NORMOCYTIC ANEMIA: ICD-10-CM

## 2019-07-24 DIAGNOSIS — K90.9 IRON MALABSORPTION: Primary | ICD-10-CM

## 2019-07-24 DIAGNOSIS — D50.8 OTHER IRON DEFICIENCY ANEMIA: ICD-10-CM

## 2019-07-24 DIAGNOSIS — E66.01 MORBID OBESITY WITH BMI OF 60.0-69.9, ADULT (HCC): ICD-10-CM

## 2019-07-24 PROCEDURE — 99213 OFFICE O/P EST LOW 20 MIN: CPT | Performed by: INTERNAL MEDICINE

## 2019-07-24 PROCEDURE — 1036F TOBACCO NON-USER: CPT | Performed by: INTERNAL MEDICINE

## 2019-07-24 PROCEDURE — G8417 CALC BMI ABV UP PARAM F/U: HCPCS | Performed by: INTERNAL MEDICINE

## 2019-07-24 PROCEDURE — G8427 DOCREV CUR MEDS BY ELIG CLIN: HCPCS | Performed by: INTERNAL MEDICINE

## 2019-07-24 NOTE — PROGRESS NOTES
incontinence, and hematuria   Integument:   Lymphedema of legs    Hematologic/Lymphatic: negative for easy bruising, bleeding, lymphadenopathy, petechiae and swelling/edema   Endocrine:morbid obesity   Musculoskeletal: back and knees pain, no swelling   Neurological: negative for headaches, dizziness, seizures, weakness, numbness      Physical Examination :       BP (!) 189/111 (Site: Right Lower Arm, Position: Sitting, Cuff Size: Medium Adult)   Pulse 67   Temp 97.2 °F (36.2 °C) (Temporal)   Resp 18   Ht 5' 6\" (1.676 m)   Wt (!) 421 lb (191 kg)   BMI 67.95 kg/m²     Performance Status:Estimated performance status is ECOG 2    General appearance - morbidly obese, 470 pounds   Neck - supple, no significant adenopathy ,trach is central   Lymphatics - no palpable lymphadenopathy, no hepatosplenomegaly   Chest - clear to auscultation, limited chest expansion  Heart - normal rate, regular rhythm, distant S1, S2,    Abdomen - Extremely obese abdomen, not tender, it are difficult to assess for hepatosplenomegaly or organomegaly  Neurological - alert, oriented, normal speech, no focal findings or movement disorder noted   Musculoskeletal - no joint tenderness, deformity or swelling   Extremities - Severe lymphedema and is wearing from morbid obesity, venous stasis unchanged  Skin -Venous stasis changes to both lower extremities      Lab Results   Component Value Date     03/06/2018    K 3.9 03/06/2018    CL 96 03/06/2018    CO2 28 03/06/2018    BUN 21 03/06/2018    CREATININE 1.39 03/06/2018    GLUCOSE 103 03/06/2018    GLUCOSE 105 05/31/2012    CALCIUM 9.6 03/06/2018     Lab Results   Component Value Date    WBC 5.6 07/19/2019    HGB 12.5 07/19/2019    HCT 40.5 07/19/2019    MCV 90.6 07/19/2019     07/19/2019     05/31/2012       Lab Results   Component Value Date    IRON 36 (L) 07/19/2019    TIBC 243 (L) 07/19/2019    FERRITIN 197 (H) 07/19/2019           Assessment:    1.  Mild to moderate iron

## 2019-07-26 ENCOUNTER — HOSPITAL ENCOUNTER (OUTPATIENT)
Dept: PHYSICAL THERAPY | Age: 49
Setting detail: THERAPIES SERIES
Discharge: HOME | End: 2019-07-26
Payer: MEDICAID

## 2019-07-26 PROCEDURE — 97140 MANUAL THERAPY 1/> REGIONS: CPT

## 2019-07-26 PROCEDURE — 97110 THERAPEUTIC EXERCISES: CPT

## 2019-07-26 NOTE — PROGRESS NOTES
Phone: [phone]                 [facility]           Fax: [phone]                           Outpatient Physical Therapy                                                                            Daily Note    Patient: Sabina Marinelli : 1970  [facility] #: [id number]   Referring Practitioner:  Dr. Lyudmila Avendano     Referral Date : 19     Date: 2019    Diagnosis: Lymphedema I89.0  Treatment Diagnosis: BLE lymphedema     Onset Date: 19  PT Insurance Information: [employer]  Total # of Visits Approved: 22 Per Physician Order  Total # of Visits to Date: 17      Pre-Treatment Pain:  0/10  Subjective: Pt continues to report she is swollen, pt also reported she received her pumps     Exercises:  Exercise 1: pt educated on keeping legs elevated, compressed and performing ankle pumps daily     Manual:  Other: MLD to B LE       Assessment  Assessment: Pt wants to come to one more visit and then be placed damian  follow up     Patient Education  *Patient Education: one more visit then follow up in a month   Pt verbalized/demonstrated good understanding:     [x] Yes         [] No, pt required further clarification.     Post Treatment Pain: 0/10      Plan  Times per week: 2x/wk   Plan weeks: 4-6 weeks       Goals  (Total # of Visits to Date: 16)   Short Term Goals - Time Frame for Short term goals: 3 weeks    Short term goal 1: Pt will be educated on her POC and HEP-MET                                        []Met   []Partially met  []Not met   Short term goal 2: Pt will initiate pump protocol in order to reduce BLE lymphedema -met  []Met   []Partially met  []Not met      []Met   []Partially met  []Not met      []Met   []Partially met  []Not met     Long Term Goals - Time Frame for Long term goals : 6 weeks  Long term goal 1: Pt will be safe and independent with compression, elevation and exercises-MET []Met  []Partially met  []Not met   Long term goal 2: Pt will decrease BLe edema by 5-7cm in order to reduce difficulty with ambulation []Met  []Partially met  []Not met   Long term goal 3: Pt will report 50% improvement in symptoms []Met  []Partially met  []Not met   Long term goal 4: Pt will be fitted for pump and compression garament to maintain edema at home -MET []Met  []Partially met  []Not met     []Met  []Partially met  []Not met       Minutes Tracking:  Time In: 0930  Time Out: 1052  Minutes: 82    Jose Killian PT, DPT      Date: 7/26/2019

## 2019-08-09 ENCOUNTER — HOSPITAL ENCOUNTER (OUTPATIENT)
Dept: PHYSICAL THERAPY | Age: 49
Setting detail: THERAPIES SERIES
Discharge: HOME | End: 2019-08-09
Payer: MEDICAID

## 2019-08-09 PROCEDURE — 97110 THERAPEUTIC EXERCISES: CPT

## 2019-08-09 PROCEDURE — 97140 MANUAL THERAPY 1/> REGIONS: CPT

## 2019-08-15 NOTE — PROGRESS NOTES
by 5-7cm in order to reduce difficulty with ambulation []Met  []Partially met  []Not met   Long term goal 3: Pt will report 50% improvement in symptoms []Met  []Partially met  []Not met   Long term goal 4: Pt will be fitted for pump and compression garament to maintain edema at home -MET []Met  []Partially met  []Not met     []Met  []Partially met  []Not met       Minutes Tracking:  Time In: 1100  Time Out: [address] Gayatri Reyes. S.W  Minutes: [address] [facility] PT, DPT      Date: 8/15/2019

## 2019-10-04 ENCOUNTER — HOSPITAL ENCOUNTER (OUTPATIENT)
Age: 49
Setting detail: SPECIMEN
Discharge: HOME | End: 2019-10-04
Payer: MEDICAID

## 2019-10-04 LAB
ABSOLUTE EOS #: 0.11 K/UL (ref 0–0.44)
ABSOLUTE IMMATURE GRANULOCYTE: 0.04 K/UL (ref 0–0.3)
ABSOLUTE LYMPH #: 1.56 K/UL (ref 1.1–3.7)
ABSOLUTE MONO #: 0.45 K/UL (ref 0.1–1.2)
ALBUMIN SERPL-MCNC: 4.2 G/DL (ref 3.5–5.2)
ALBUMIN/GLOBULIN RATIO: 1.1 (ref 1–2.5)
ALP BLD-CCNC: 85 U/L (ref 35–104)
ALT SERPL-CCNC: 5 U/L (ref 5–33)
ANION GAP SERPL CALCULATED.3IONS-SCNC: 15 MMOL/L (ref 9–17)
AST SERPL-CCNC: 10 U/L
BASOPHILS # BLD: 1 % (ref 0–2)
BASOPHILS ABSOLUTE: 0.06 K/UL (ref 0–0.2)
BILIRUB SERPL-MCNC: 0.37 MG/DL (ref 0.3–1.2)
BUN BLDV-MCNC: 12 MG/DL (ref 6–20)
BUN/CREAT BLD: ABNORMAL (ref 9–20)
CALCIUM SERPL-MCNC: 9.7 MG/DL (ref 8.6–10.4)
CHLORIDE BLD-SCNC: 105 MMOL/L (ref 98–107)
CHOLESTEROL/HDL RATIO: 3.9
CHOLESTEROL: 264 MG/DL
CO2: 21 MMOL/L (ref 20–31)
CREAT SERPL-MCNC: 1.02 MG/DL (ref 0.5–0.9)
DIFFERENTIAL TYPE: ABNORMAL
EOSINOPHILS RELATIVE PERCENT: 2 % (ref 1–4)
GFR AFRICAN AMERICAN: >60 ML/MIN
GFR NON-AFRICAN AMERICAN: 58 ML/MIN
GFR SERPL CREATININE-BSD FRML MDRD: ABNORMAL ML/MIN/{1.73_M2}
GFR SERPL CREATININE-BSD FRML MDRD: ABNORMAL ML/MIN/{1.73_M2}
GLUCOSE BLD-MCNC: 97 MG/DL (ref 70–99)
HCT VFR BLD CALC: 42 % (ref 36.3–47.1)
HDLC SERPL-MCNC: 68 MG/DL
HEMOGLOBIN: 13 G/DL (ref 11.9–15.1)
IMMATURE GRANULOCYTES: 1 %
LDL CHOLESTEROL: 179 MG/DL (ref 0–130)
LYMPHOCYTES # BLD: 21 % (ref 24–43)
MCH RBC QN AUTO: 28.1 PG (ref 25.2–33.5)
MCHC RBC AUTO-ENTMCNC: 31 G/DL (ref 28.4–34.8)
MCV RBC AUTO: 90.9 FL (ref 82.6–102.9)
MONOCYTES # BLD: 6 % (ref 3–12)
NRBC AUTOMATED: 0 PER 100 WBC
PDW BLD-RTO: 14 % (ref 11.8–14.4)
PLATELET # BLD: 321 K/UL (ref 138–453)
PLATELET ESTIMATE: ABNORMAL
PMV BLD AUTO: 11.2 FL (ref 8.1–13.5)
POTASSIUM SERPL-SCNC: 4.4 MMOL/L (ref 3.7–5.3)
RBC # BLD: 4.62 M/UL (ref 3.95–5.11)
RBC # BLD: ABNORMAL 10*6/UL
SEG NEUTROPHILS: 69 % (ref 36–65)
SEGMENTED NEUTROPHILS ABSOLUTE COUNT: 5.2 K/UL (ref 1.5–8.1)
SODIUM BLD-SCNC: 141 MMOL/L (ref 135–144)
TOTAL PROTEIN: 8.2 G/DL (ref 6.4–8.3)
TRIGL SERPL-MCNC: 87 MG/DL
TSH SERPL DL<=0.05 MIU/L-ACNC: 1.18 MIU/L (ref 0.3–5)
VLDLC SERPL CALC-MCNC: ABNORMAL MG/DL (ref 1–30)
WBC # BLD: 7.4 K/UL (ref 3.5–11.3)
WBC # BLD: ABNORMAL 10*3/UL

## 2019-10-08 ENCOUNTER — HOSPITAL ENCOUNTER (OUTPATIENT)
Dept: ULTRASOUND IMAGING | Age: 49
Discharge: HOME | End: 2019-10-10
Payer: MEDICAID

## 2019-10-08 DIAGNOSIS — N94.6 DYSMENORRHEA: ICD-10-CM

## 2019-10-08 PROCEDURE — 76830 TRANSVAGINAL US NON-OB: CPT

## 2019-11-17 ENCOUNTER — HOSPITAL ENCOUNTER (OUTPATIENT)
Dept: WOMENS IMAGING | Age: 49
Discharge: HOME | End: 2019-11-19
Payer: MEDICAID

## 2019-11-17 DIAGNOSIS — Z12.31 BREAST CANCER SCREENING BY MAMMOGRAM: ICD-10-CM

## 2019-11-17 PROCEDURE — 77063 BREAST TOMOSYNTHESIS BI: CPT

## 2019-12-30 NOTE — DISCHARGE SUMMARY
Phone: Sharmin          Fax: [phone]                            Outpatient Physical Therapy                                                                    Discharge Summary    Patient: Aden Becerra  : 1970  [facility] #: [id number]   Referring physician: No admitting provider for patient encounter. Referring Practitioner: Dr. Veto Wall       Diagnosis: Lymphedema I89.0      Date Treatment Initiated: 5/15/19  Date of Last Treatment: 19      PT Visit Information  Onset Date: 19  PT Insurance Information: [facility]  Total # of Visits Approved: 22  Total # of Visits to Date:   Plan of Care/Certification Expiration Date: 19  No Show: 0  Canceled Appointment: 0      Frequency/Duration  1 month follow up  times per week  .  weeks      Treatment Received  [] HP/CP      [] Electrical Stim   [x] Therapeutic Exercise      [] Gait Training  [] Aquatics   [] Ultrasound         [x] Patient Education/HEP   [] Manual Therapy  [] Traction    [] Neuro-juan carlos        [] Soft Tissue Mobs            [] Home TENS  [] Iontophoresis    [] Orthotic casting/fitting      [x] lymphedema management   Assessment  Assessment: Pt no showed to one month follow up, pt was independent with pump use at home and D/C       Goals  Short term goals  Time Frame for Short term goals: 3 weeks  Short term goal 1: Pt will be educated on her POC and HEP-MET  Short term goal 2: Pt will initiate pump protocol in order to reduce BLE lymphedema -met    Long term goals  Time Frame for Long term goals : 6 weeks  Long term goal 1: Pt will be safe and independent with compression, elevation and exercises-MET  Long term goal 2: Pt will decrease BLe edema by 5-7cm in order to reduce difficulty with ambulation  Long term goal 3: Pt will report 50% improvement in symptoms  Long term goal 4: Pt will be fitted for pump and compression garament to maintain edema at home -MET      Reason for

## 2020-08-25 ENCOUNTER — HOSPITAL ENCOUNTER (OUTPATIENT)
Age: 50
Discharge: HOME | End: 2020-08-25
Payer: MEDICAID

## 2020-08-25 LAB
ABSOLUTE EOS #: 0.07 K/UL (ref 0–0.44)
ABSOLUTE IMMATURE GRANULOCYTE: <0.03 K/UL (ref 0–0.3)
ABSOLUTE LYMPH #: 1.35 K/UL (ref 1.1–3.7)
ABSOLUTE MONO #: 0.49 K/UL (ref 0.1–1.2)
ALBUMIN SERPL-MCNC: 4.1 G/DL (ref 3.5–5.2)
ALBUMIN/GLOBULIN RATIO: 1.2 (ref 1–2.5)
ALP BLD-CCNC: 63 U/L (ref 35–104)
ALT SERPL-CCNC: <5 U/L (ref 5–33)
ANION GAP SERPL CALCULATED.3IONS-SCNC: 11 MMOL/L (ref 9–17)
AST SERPL-CCNC: 9 U/L
BASOPHILS # BLD: 1 % (ref 0–2)
BASOPHILS ABSOLUTE: 0.05 K/UL (ref 0–0.2)
BILIRUB SERPL-MCNC: 0.55 MG/DL (ref 0.3–1.2)
BUN BLDV-MCNC: 10 MG/DL (ref 6–20)
BUN/CREAT BLD: 9 (ref 9–20)
CALCIUM SERPL-MCNC: 9.6 MG/DL (ref 8.6–10.4)
CHLORIDE BLD-SCNC: 99 MMOL/L (ref 98–107)
CHOLESTEROL/HDL RATIO: 3.9
CHOLESTEROL: 272 MG/DL
CO2: 24 MMOL/L (ref 20–31)
CREAT SERPL-MCNC: 1.06 MG/DL (ref 0.5–0.9)
DIFFERENTIAL TYPE: NORMAL
EOSINOPHILS RELATIVE PERCENT: 1 % (ref 1–4)
FERRITIN: 140 UG/L (ref 13–150)
FOLATE: 7.1 NG/ML
GFR AFRICAN AMERICAN: >60 ML/MIN
GFR NON-AFRICAN AMERICAN: 55 ML/MIN
GFR SERPL CREATININE-BSD FRML MDRD: ABNORMAL ML/MIN/{1.73_M2}
GFR SERPL CREATININE-BSD FRML MDRD: ABNORMAL ML/MIN/{1.73_M2}
GLUCOSE BLD-MCNC: 107 MG/DL (ref 70–99)
HCT VFR BLD CALC: 40.7 % (ref 36.3–47.1)
HDLC SERPL-MCNC: 69 MG/DL
HEMOGLOBIN: 12.5 G/DL (ref 11.9–15.1)
IMMATURE GRANULOCYTES: 0 %
IRON SATURATION: 20 % (ref 20–55)
IRON: 43 UG/DL (ref 37–145)
LDL CHOLESTEROL: 185 MG/DL (ref 0–130)
LYMPHOCYTES # BLD: 25 % (ref 24–43)
MCH RBC QN AUTO: 27.5 PG (ref 25.2–33.5)
MCHC RBC AUTO-ENTMCNC: 30.7 G/DL (ref 28.4–34.8)
MCV RBC AUTO: 89.5 FL (ref 82.6–102.9)
MONOCYTES # BLD: 9 % (ref 3–12)
NRBC AUTOMATED: 0 PER 100 WBC
PDW BLD-RTO: 14.1 % (ref 11.8–14.4)
PLATELET # BLD: 280 K/UL (ref 138–453)
PLATELET ESTIMATE: NORMAL
PMV BLD AUTO: 10.7 FL (ref 8.1–13.5)
POTASSIUM SERPL-SCNC: 3.8 MMOL/L (ref 3.7–5.3)
RBC # BLD: 4.55 M/UL (ref 3.95–5.11)
RBC # BLD: NORMAL 10*6/UL
SEG NEUTROPHILS: 64 % (ref 36–65)
SEGMENTED NEUTROPHILS ABSOLUTE COUNT: 3.49 K/UL (ref 1.5–8.1)
SODIUM BLD-SCNC: 134 MMOL/L (ref 135–144)
TOTAL IRON BINDING CAPACITY: 217 UG/DL (ref 250–450)
TOTAL PROTEIN: 7.5 G/DL (ref 6.4–8.3)
TRIGL SERPL-MCNC: 88 MG/DL
TSH SERPL DL<=0.05 MIU/L-ACNC: 2.33 MIU/L (ref 0.3–5)
UNSATURATED IRON BINDING CAPACITY: 174 UG/DL (ref 112–347)
VITAMIN B-12: 534 PG/ML (ref 232–1245)
VLDLC SERPL CALC-MCNC: ABNORMAL MG/DL (ref 1–30)
WBC # BLD: 5.5 K/UL (ref 3.5–11.3)
WBC # BLD: NORMAL 10*3/UL

## 2020-08-25 PROCEDURE — 80053 COMPREHEN METABOLIC PANEL: CPT

## 2020-08-25 PROCEDURE — 83550 IRON BINDING TEST: CPT

## 2020-08-25 PROCEDURE — 36415 COLL VENOUS BLD VENIPUNCTURE: CPT

## 2020-08-25 PROCEDURE — 82607 VITAMIN B-12: CPT

## 2020-08-25 PROCEDURE — 82746 ASSAY OF FOLIC ACID SERUM: CPT

## 2020-08-25 PROCEDURE — 80061 LIPID PANEL: CPT

## 2020-08-25 PROCEDURE — 85025 COMPLETE CBC W/AUTO DIFF WBC: CPT

## 2020-08-25 PROCEDURE — 83540 ASSAY OF IRON: CPT

## 2020-08-25 PROCEDURE — 84443 ASSAY THYROID STIM HORMONE: CPT

## 2020-08-25 PROCEDURE — 82728 ASSAY OF FERRITIN: CPT

## 2020-08-27 ENCOUNTER — OFFICE VISIT (OUTPATIENT)
Dept: ONCOLOGY | Age: 50
End: 2020-08-27
Payer: MEDICAID

## 2020-08-27 VITALS
HEART RATE: 71 BPM | BODY MASS INDEX: 47.09 KG/M2 | TEMPERATURE: 97.7 F | SYSTOLIC BLOOD PRESSURE: 204 MMHG | WEIGHT: 293 LBS | DIASTOLIC BLOOD PRESSURE: 124 MMHG | RESPIRATION RATE: 20 BRPM | HEIGHT: 66 IN

## 2020-08-27 PROCEDURE — 1036F TOBACCO NON-USER: CPT | Performed by: INTERNAL MEDICINE

## 2020-08-27 PROCEDURE — G8417 CALC BMI ABV UP PARAM F/U: HCPCS | Performed by: INTERNAL MEDICINE

## 2020-08-27 PROCEDURE — 99214 OFFICE O/P EST MOD 30 MIN: CPT | Performed by: INTERNAL MEDICINE

## 2020-08-27 PROCEDURE — G8427 DOCREV CUR MEDS BY ELIG CLIN: HCPCS | Performed by: INTERNAL MEDICINE

## 2020-09-08 NOTE — PROGRESS NOTES
She has recovered well since then. The patient was offered IV iron to try to improve her symptoms and hemoglobin. Interim history:  The patient comes in today for a follow-up, she received IV iron accompanied by improvement in her hemoglobin and ferritin. Patient feels tired. She has no active bleeding. Past Medical History   has a past medical history of Anemia, Asthma, Cellulitis, COPD (chronic obstructive pulmonary disease) ([address] Difficult intravenous access, Fibromyalgia, Gout, H. pylori infection, Hyperlipidemia, Hypertension, Iron deficiency, Low back pain, Lumbago, Lymphedema of lower extremity, Myalgia and myositis, unspecified, Obesity, Osteoarthritis, Pain in joint, lower leg, Sciatica, Seizures ([address] and Sleep apnea. Surgical History   has a past surgical history that includes Closed Reduction of a Joint (Right, 09/23/2016); Upper gastrointestinal endoscopy (2016); Upper gastrointestinal endoscopy (12-9-15); [employer] tooth extraction (2005); [employer] tooth extraction (02/2015); Mouth surgery (2005); Tonsillectomy (1978); cardiovascular stress test (08/30/2016); Sleeve Gastrectomy (09/19/2016); Shoulder arthroscopy (Right, 03/08/2017); and Shoulder arthroscopy (Right, 3/8/2017). Home Medications  has a current medication list which includes the following prescription(s): epinephrine, atorvastatin, montelukast, mometasone, tizanidine, vasculera, ranitidine, metoclopramide, pantoprazole, fluticasone, docusate sodium, potassium chloride, nabumetone, vitamin d, vitamin b-1, ferrous sulfate, multiple vitamin, calcium citrate-vitamin d, albuterol, spironolactone-hydrochlorothiazide, silver sulfadiazine, and gabapentin. Allergies:  Penicillins and Penicillins        Review of Systems :  Constitutional: No fever or chills.  No night sweats,fatigued ,limited mobility from weight   HEENT: negative for sore mouth, sore throat, hoarseness and voice change   Respiratory: sob on exertion, no cough Cardiovascular: negative for chest pain, dyspnea, palpitations, orthopnea, PND   Gastrointestinal: negative for nausea, vomiting, diarrhea, constipation, abdominal pain,   Genitourinary: negative for frequency, dysuria, nocturia, urinary incontinence, and hematuria   Integument:   Lymphedema of legs    Hematologic/Lymphatic: negative for easy bruising, bleeding, lymphadenopathy, petechiae and swelling/edema   Endocrine:morbid obesity   Musculoskeletal: back and knees pain, no swelling   Neurological: negative for headaches, dizziness, seizures, weakness, numbness      Physical Examination :       BP (!) 204/124 (Site: Left Lower Arm, Position: Sitting, Cuff Size: Medium Adult)   Pulse 71   Temp 97.7 °F (36.5 °C) (Temporal)   Resp 20   Ht 5' 6\" (1.676 m)   Wt (!) 420 lb (190.5 kg)   BMI 67.79 kg/m²     Performance Status:Estimated performance status is ECOG 2    General appearance - morbidly obese, 470 pounds   Neck - supple, no significant adenopathy ,trach is central   Lymphatics - no palpable lymphadenopathy, no hepatosplenomegaly   Chest - clear to auscultation, limited chest expansion  Heart - normal rate, regular rhythm, distant S1, S2,    Abdomen - Extremely obese abdomen, not tender, it are difficult to assess for hepatosplenomegaly or organomegaly  Neurological - alert, oriented, normal speech, no focal findings or movement disorder noted   Musculoskeletal - no joint tenderness, deformity or swelling   Extremities - Severe lymphedema and is wearing from morbid obesity, venous stasis unchanged  Skin -Venous stasis changes to both lower extremities      Lab Results   Component Value Date     08/25/2020    K 3.8 08/25/2020    CL 99 08/25/2020    CO2 24 08/25/2020    BUN 10 08/25/2020    CREATININE 1.06 08/25/2020    GLUCOSE 107 08/25/2020    GLUCOSE 105 05/31/2012    CALCIUM 9.6 08/25/2020     Lab Results   Component Value Date    WBC 5.5 08/25/2020    HGB 12.5 08/25/2020    HCT 40.7 08/25/2020 MCV 89.5 08/25/2020     08/25/2020     05/31/2012       Lab Results   Component Value Date    IRON 43 08/25/2020    TIBC 217 (L) 08/25/2020    FERRITIN 140 08/25/2020           Assessment:    1. Mild to moderate iron deficiency anemia, with fatigue, has poor iron absorption, s/p IV iron, ideally she should be scoped but at this point logistically difficult because of morbid obesity. Seems that her hemoglobin stable. There is need for any IV iron but she might require IV iron in the future. 2. Very morbid obesity, she is status post bariatric surgery on September 19, 2016.  3. Status post fat reduction surgery from both thighs in June 2018        Plan:     1. The patient hemoglobin and ferritin have normalized  2. I asked her to continue on maintenance multivitamin but no need to take extra iron  3. We will continue to watch her hemoglobin and ferritin  4. Unfortunately she has multiple struggles mostly related to her lymphedema and weight. She will continue to work with physical therapy. She has a lymphedema pump approved and hopefully that will help  5. RV 4-6 months with labs before [address] [place] [address] [facility]                          Cell: [phone]    This note is created with the assistance of a speech recognition program.  While intending to generate a document that actually reflects the content of the visit, the document can still have some errors including those of syntax and sound a like substitutions which may escape proof reading. It such instances, actual meaning can be extrapolated by contextual diversion.

## 2020-09-09 ENCOUNTER — TELEPHONE (OUTPATIENT)
Dept: BARIATRICS/WEIGHT MGMT | Age: 50
End: 2020-09-09

## 2020-10-12 ENCOUNTER — APPOINTMENT (OUTPATIENT)
Dept: CT IMAGING | Age: 50
End: 2020-10-12
Payer: MEDICAID

## 2020-10-12 ENCOUNTER — HOSPITAL ENCOUNTER (EMERGENCY)
Age: 50
Discharge: HOME | End: 2020-10-12
Payer: MEDICAID

## 2020-10-12 VITALS
OXYGEN SATURATION: 98 % | BODY MASS INDEX: 67.79 KG/M2 | TEMPERATURE: 98 F | RESPIRATION RATE: 18 BRPM | HEART RATE: 81 BPM | WEIGHT: 293 LBS | SYSTOLIC BLOOD PRESSURE: 180 MMHG | DIASTOLIC BLOOD PRESSURE: 100 MMHG

## 2020-10-12 LAB
-: NORMAL
ABSOLUTE EOS #: <0.03 K/UL (ref 0–0.44)
ABSOLUTE IMMATURE GRANULOCYTE: 0.03 K/UL (ref 0–0.3)
ABSOLUTE LYMPH #: 1.58 K/UL (ref 1.1–3.7)
ABSOLUTE MONO #: 0.49 K/UL (ref 0.1–1.2)
ALBUMIN SERPL-MCNC: 3.7 G/DL (ref 3.5–5.2)
ALBUMIN/GLOBULIN RATIO: 1 (ref 1–2.5)
ALP BLD-CCNC: 77 U/L (ref 35–104)
ALT SERPL-CCNC: <5 U/L (ref 5–33)
AMORPHOUS: NORMAL
ANION GAP SERPL CALCULATED.3IONS-SCNC: 9 MMOL/L (ref 9–17)
AST SERPL-CCNC: 10 U/L
BACTERIA: NORMAL
BASOPHILS # BLD: 1 % (ref 0–2)
BASOPHILS ABSOLUTE: 0.04 K/UL (ref 0–0.2)
BILIRUB SERPL-MCNC: 0.58 MG/DL (ref 0.3–1.2)
BILIRUBIN URINE: NEGATIVE
BUN BLDV-MCNC: 10 MG/DL (ref 6–20)
BUN/CREAT BLD: 10 (ref 9–20)
CALCIUM SERPL-MCNC: 9.1 MG/DL (ref 8.6–10.4)
CASTS UA: NORMAL /LPF
CHLORIDE BLD-SCNC: 103 MMOL/L (ref 98–107)
CO2: 25 MMOL/L (ref 20–31)
COLOR: YELLOW
COMMENT UA: ABNORMAL
CREAT SERPL-MCNC: 1.01 MG/DL (ref 0.5–0.9)
CRYSTALS, UA: NORMAL /HPF
DIFFERENTIAL TYPE: ABNORMAL
EOSINOPHILS RELATIVE PERCENT: 0 % (ref 1–4)
EPITHELIAL CELLS UA: NORMAL /HPF (ref 0–25)
GFR AFRICAN AMERICAN: >60 ML/MIN
GFR NON-AFRICAN AMERICAN: 58 ML/MIN
GFR SERPL CREATININE-BSD FRML MDRD: ABNORMAL ML/MIN/{1.73_M2}
GFR SERPL CREATININE-BSD FRML MDRD: ABNORMAL ML/MIN/{1.73_M2}
GLUCOSE BLD-MCNC: 95 MG/DL (ref 70–99)
GLUCOSE URINE: NEGATIVE
HCG(URINE) PREGNANCY TEST: NEGATIVE
HCT VFR BLD CALC: 37.4 % (ref 36.3–47.1)
HEMOGLOBIN: 11.5 G/DL (ref 11.9–15.1)
IMMATURE GRANULOCYTES: 1 %
KETONES, URINE: NEGATIVE
LACTIC ACID, WHOLE BLOOD: NORMAL MMOL/L (ref 0.7–2.1)
LACTIC ACID: 0.8 MMOL/L (ref 0.5–2.2)
LEUKOCYTE ESTERASE, URINE: NEGATIVE
LIPASE: 13 U/L (ref 13–60)
LYMPHOCYTES # BLD: 26 % (ref 24–43)
MCH RBC QN AUTO: 27.4 PG (ref 25.2–33.5)
MCHC RBC AUTO-ENTMCNC: 30.7 G/DL (ref 28.4–34.8)
MCV RBC AUTO: 89 FL (ref 82.6–102.9)
MONOCYTES # BLD: 8 % (ref 3–12)
MUCUS: NORMAL
NITRITE, URINE: NEGATIVE
NRBC AUTOMATED: 0 PER 100 WBC
OTHER OBSERVATIONS UA: NORMAL
PDW BLD-RTO: 14.5 % (ref 11.8–14.4)
PH UA: 7.5 (ref 5–9)
PLATELET # BLD: 241 K/UL (ref 138–453)
PLATELET ESTIMATE: ABNORMAL
PMV BLD AUTO: 10.9 FL (ref 8.1–13.5)
POTASSIUM SERPL-SCNC: 4.1 MMOL/L (ref 3.7–5.3)
PROTEIN UA: ABNORMAL
RBC # BLD: 4.2 M/UL (ref 3.95–5.11)
RBC # BLD: ABNORMAL 10*6/UL
RBC UA: NORMAL /HPF (ref 0–2)
RENAL EPITHELIAL, UA: NORMAL /HPF
SEG NEUTROPHILS: 64 % (ref 36–65)
SEGMENTED NEUTROPHILS ABSOLUTE COUNT: 3.94 K/UL (ref 1.5–8.1)
SODIUM BLD-SCNC: 137 MMOL/L (ref 135–144)
SPECIFIC GRAVITY UA: 1.02 (ref 1.01–1.02)
TOTAL PROTEIN: 7.3 G/DL (ref 6.4–8.3)
TRICHOMONAS: NORMAL
TURBIDITY: CLEAR
URINE HGB: NEGATIVE
UROBILINOGEN, URINE: NORMAL
WBC # BLD: 6.1 K/UL (ref 3.5–11.3)
WBC # BLD: ABNORMAL 10*3/UL
WBC UA: NORMAL /HPF (ref 0–5)
YEAST: NORMAL

## 2020-10-12 PROCEDURE — 99281 EMR DPT VST MAYX REQ PHY/QHP: CPT

## 2020-10-12 PROCEDURE — 81001 URINALYSIS AUTO W/SCOPE: CPT

## 2020-10-12 PROCEDURE — 85025 COMPLETE CBC W/AUTO DIFF WBC: CPT

## 2020-10-12 PROCEDURE — 83605 ASSAY OF LACTIC ACID: CPT

## 2020-10-12 PROCEDURE — 81025 URINE PREGNANCY TEST: CPT

## 2020-10-12 PROCEDURE — 74177 CT ABD & PELVIS W/CONTRAST: CPT

## 2020-10-12 PROCEDURE — 83690 ASSAY OF LIPASE: CPT

## 2020-10-12 PROCEDURE — 36415 COLL VENOUS BLD VENIPUNCTURE: CPT

## 2020-10-12 PROCEDURE — 80053 COMPREHEN METABOLIC PANEL: CPT

## 2020-10-12 PROCEDURE — 99283 EMERGENCY DEPT VISIT LOW MDM: CPT

## 2020-10-12 PROCEDURE — 6360000004 HC RX CONTRAST MEDICATION: Performed by: NURSE PRACTITIONER

## 2020-10-12 PROCEDURE — 99282 EMERGENCY DEPT VISIT SF MDM: CPT

## 2020-10-12 RX ADMIN — IOPAMIDOL 75 ML: 755 INJECTION, SOLUTION INTRAVENOUS at 15:15

## 2020-10-12 ASSESSMENT — PAIN DESCRIPTION - FREQUENCY: FREQUENCY: CONTINUOUS

## 2020-10-12 ASSESSMENT — PAIN SCALES - GENERAL: PAINLEVEL_OUTOF10: 7

## 2020-10-12 ASSESSMENT — PAIN DESCRIPTION - LOCATION: LOCATION: ABDOMEN

## 2020-10-12 ASSESSMENT — PAIN DESCRIPTION - ORIENTATION: ORIENTATION: LEFT;LOWER

## 2020-10-12 ASSESSMENT — PAIN DESCRIPTION - PAIN TYPE: TYPE: ACUTE PAIN

## 2020-10-12 ASSESSMENT — PAIN DESCRIPTION - DESCRIPTORS: DESCRIPTORS: ACHING

## 2020-10-12 NOTE — ED PROVIDER NOTES
[facility] ED  EMERGENCY DEPARTMENT ENCOUNTER      Pt Name: Rose Montoya  MRN: [id number]  [place] 1970  Date of evaluation: 10/12/2020  Provider: Lelon Olszewski, APRN - [place] [id number]       Chief Complaint   Patient presents with    Abdominal Pain     pt c/o LLQ pain since last week. HISTORY OF PRESENT ILLNESS   (Location/Symptom, Timing/Onset, Context/Setting, Quality, Duration, Modifying Factors, Severity)  Note limiting factors. Rose Montoya is a 53 y.o. female who presents to the emergency department for a complaint of left lower quadrant abdominal pain that started 1 week ago. Patient states that her pain has continued to increase. She is a 7 out of 10 on the pain scale. She reports some nausea but denies any vomiting or diarrhea. She denies any blood in her stool. She denies any chest pain or shortness of breath. Nursing Notes were reviewed. REVIEW OF SYSTEMS    (2-9 systems for level 4, 10 or more for level 5)   Constitutional: Negative for fever, chills  Skin: Negative for rash, itching  HENT: Negative for sore throat, rhinorrhea and sinus pain. Eyes: Negative for eye discharge, eye redness  Cardiovascular: Negative for chest pain, dyspnea on exertion  Respiratory: Negative for cough, shortness of breath, wheezing or stridor. Gastrointestinal: See HPI  Genitourinary: Negative for bladder incontinence, dysuria, frequency. Musculoskeletal: Negative for myalgias, back pain, falls. Neurological: Negative for tingling, headaches. Except as noted above the remainder of the review of systems was reviewed and negative.        PAST MEDICAL HISTORY     Past Medical History:   Diagnosis Date    Anemia 2008    IRON DEFIENCY    Asthma     2011    Cellulitis 2005    KNEES    COPD (chronic obstructive pulmonary disease) ([facility] [address] 2011    INHALER DAILY AND PRN    Difficult intravenous access     Fibromyalgia     1997    Gout 2011    H. pylori infection     Arthritis Mother     Depression Mother     Stroke Father     High Blood Pressure Father     Diabetes Father     High Cholesterol Father     Sickle Cell Anemia Maternal Aunt         aunt has the trait    Kidney Disease Sister         KIDNEY FAILURE    Kidney Disease Sister         SIDS-2 MONTHS OLD          SOCIAL HISTORY       Social History     Socioeconomic History    Marital status: Single     Spouse name: None    Number of children: None    Years of education: None    Highest education level: None   Occupational History    None   Social Needs    Financial resource strain: None    Food insecurity     Worry: None     Inability: None    Transportation needs     Medical: None     Non-medical: None   Tobacco Use    Smoking status: Never Smoker    Smokeless tobacco: Never Used   Substance and Sexual Activity    Alcohol use: No    Drug use: No    Sexual activity: None   Lifestyle    Physical activity     Days per week: None     Minutes per session: None    Stress: None   Relationships    Social connections     Talks on phone: None     Gets together: None     Attends Rastafarian service: None     Active member of club or organization: None     Attends meetings of clubs or organizations: None     Relationship status: None    Intimate partner violence     Fear of current or ex partner: None     Emotionally abused: None     Physically abused: None     Forced sexual activity: None   Other Topics Concern    None   Social History Narrative    ** Merged History Encounter **            PHYSICAL EXAM    (up to 7 for level 4, 8 or more for level 5)     ED Triage Vitals [10/12/20 1322]   BP Temp Temp Source Pulse Resp SpO2 Height Weight   (!) 231/115 98 °F (36.7 °C) Tympanic 81 18 99 % -- (!) 420 lb (190.5 kg)     Constitutional: Oriented and well-developed, morbidly obese but in no apparent distress. Head: Normocephalic and atraumatic. Eyes: Extra ocular muscles intact.  Pupils are equal, round, and reactive to light. No discharge. No scleral icterus. Neck: Normal range of motion and phonation normal. Neck supple. No JVD present. No spinous process tenderness and no muscular tenderness present. No cervical adenopathy. Cardiovascular: Regular rate and rhythm, normal heart sounds and intact distal pulses. No murmur heard. Pulmonary/Chest: Effort normal and breath sounds normal. No accessory muscle usage or stridor. Not tachypneic. No respiratory distress. No tenderness and no retraction. Abdominal: Soft and non-distended. Bowel sounds are normal. No masses or organomegaly. Tenderness in the left lower quadrant with guarding but no rebound. Musculoskeletal: Normal range of motion. No edema and no tenderness. Neurological: Alert and oriented. Skin: Skin is warm and dry. No rash noted. No cyanosis or erythema. No pallor. Nails show no clubbing. Psychiatric: Mood, memory, affect and judgment normal.  Exhibits ordered thought content. Affect appears normal.    DIAGNOSTIC RESULTS     EKG: All EKG's are interpreted by the Emergency Department Physician who either signs or Co-signs this chart in the absence of a cardiologist.    RADIOLOGY:   Non-plain film images such as CT, Ultrasound and MRI are read by the radiologist. Plain radiographic images are visualized and preliminarily interpreted by the emergency physician with the below findings:    Interpretation per the Radiologist below, if available at the time of this note:    CT ABDOMEN PELVIS W IV CONTRAST Additional Contrast? None   Final Result   No evidence of acute process within the abdomen or pelvis. Multiple   incidental and chronic/postsurgical findings as described including likely   left adrenal adenoma as measured and described above.                ED BEDSIDE ULTRASOUND:   Performed by ED Physician - none    LABS:  Results for orders placed or performed during the hospital encounter of 10/12/20   CBC Auto Differential   Result Value Ref Range    WBC 6.1 3.5 - 11.3 k/uL    RBC 4.20 3.95 - 5.11 m/uL    Hemoglobin 11.5 (L) 11.9 - 15.1 g/dL    Hematocrit 37.4 36.3 - 47.1 %    MCV 89.0 82.6 - 102.9 fL    MCH 27.4 25.2 - 33.5 pg    MCHC 30.7 28.4 - 34.8 g/dL    RDW 14.5 (H) 11.8 - 14.4 %    Platelets 975 582 - 798 k/uL    MPV 10.9 8.1 - 13.5 fL    NRBC Automated 0.0 0.0 per 100 WBC    Differential Type NOT REPORTED     Seg Neutrophils 64 36 - 65 %    Lymphocytes 26 24 - 43 %    Monocytes 8 3 - 12 %    Eosinophils % 0 (L) 1 - 4 %    Basophils 1 0 - 2 %    Immature Granulocytes 1 (H) 0 %    Segs Absolute 3.94 1.50 - 8.10 k/uL    Absolute Lymph # 1.58 1.10 - 3.70 k/uL    Absolute Mono # 0.49 0.10 - 1.20 k/uL    Absolute Eos # <0.03 0.00 - 0.44 k/uL    Basophils Absolute 0.04 0.00 - 0.20 k/uL    Absolute Immature Granulocyte 0.03 0.00 - 0.30 k/uL    WBC Morphology NOT REPORTED     RBC Morphology NOT REPORTED     Platelet Estimate NOT REPORTED    Comprehensive Metabolic Panel   Result Value Ref Range    Glucose 95 70 - 99 mg/dL    BUN 10 6 - 20 mg/dL    CREATININE 1.01 (H) 0.50 - 0.90 mg/dL    Bun/Cre Ratio 10 9 - 20    Calcium 9.1 8.6 - 10.4 mg/dL    Sodium 137 135 - 144 mmol/L    Potassium 4.1 3.7 - 5.3 mmol/L    Chloride 103 98 - 107 mmol/L    CO2 25 20 - 31 mmol/L    Anion Gap 9 9 - 17 mmol/L    Alkaline Phosphatase 77 35 - 104 U/L    ALT <5 (L) 5 - 33 U/L    AST 10 <32 U/L    Total Bilirubin 0.58 0.3 - 1.2 mg/dL    Total Protein 7.3 6.4 - 8.3 g/dL    Alb 3.7 3.5 - 5.2 g/dL    Albumin/Globulin Ratio 1.0 1.0 - 2.5    GFR Non- 58 (L) >60 mL/min    GFR African American >60 >60 mL/min    GFR Comment          GFR Staging         Lactic Acid, Plasma   Result Value Ref Range    Lactic Acid 0.8 0.5 - 2.2 mmol/L    Lactic Acid, Whole Blood NOT REPORTED 0.7 - 2.1 mmol/L   Lipase   Result Value Ref Range    Lipase 13 13 - 60 U/L   Urinalysis Reflex to Culture    Specimen: Urine, clean catch   Result Value Ref Range    Color, UA YELLOW YELLOW Turbidity UA CLEAR CLEAR    Glucose, Ur NEGATIVE NEGATIVE    Bilirubin Urine NEGATIVE NEGATIVE    Ketones, Urine NEGATIVE NEGATIVE    Specific Gravity, UA 1.020 1.010 - 1.020    Urine Hgb NEGATIVE NEGATIVE    pH, UA 7.5 5.0 - 9.0    Protein, UA 2+ (A) NEGATIVE    Urobilinogen, Urine Normal Normal    Nitrite, Urine NEGATIVE NEGATIVE    Leukocyte Esterase, Urine NEGATIVE NEGATIVE    Urinalysis Comments NOT REPORTED    Pregnancy, Urine   Result Value Ref Range    HCG(Urine) Pregnancy Test NEGATIVE NEGATIVE   Microscopic Urinalysis   Result Value Ref Range    -          WBC, UA 0 TO 2 0 - 5 /HPF    RBC, UA 0 TO 2 0 - 2 /HPF    Casts UA NOT REPORTED /LPF    Crystals, UA NOT REPORTED None /HPF    Epithelial Cells UA 2 TO 5 0 - 25 /HPF    Renal Epithelial, UA NOT REPORTED 0 /HPF    Bacteria, UA NOT REPORTED None    Mucus, UA NOT REPORTED None    Trichomonas, UA NOT REPORTED None    Amorphous, UA NOT REPORTED None    Other Observations UA NOT REPORTED NOT REQ. Yeast, UA NOT REPORTED None     Orders Placed This Encounter   Procedures    CT ABDOMEN PELVIS W IV CONTRAST Additional Contrast? None    CBC Auto Differential    Comprehensive Metabolic Panel    Lactic Acid, Plasma    Lipase    Urinalysis Reflex to Culture    Pregnancy, Urine    Microscopic Urinalysis    Insert peripheral IV       All other labs were within normal range or not returned as of this dictation. EMERGENCY DEPARTMENT COURSE and DIFFERENTIAL DIAGNOSIS/MDM:   Vitals:    Vitals:    10/12/20 1322   BP: (!) 231/115   Pulse: 81   Resp: 18   Temp: 98 °F (36.7 °C)   TempSrc: Tympanic   SpO2: 99%   Weight: (!) 420 lb (190.5 kg)       MEDICAL DECISION MAKING:  The patient has a left adrenal mass which is likely benign however the patient was directed to follow-up with her primary care provider regarding this mass. Patient was discharged in stable condition with no explanation of her abdominal pain. She does not have a surgical abdomen.     The patient was evaluated during the global COVID-19 pandemic, and that diagnosis was considered upon their initial presentation. Their evaluation, treatment and testing was consistent with current guidelines for patients who present with complaints or symptoms that may be related to COVID-19. Full PPE including gloves, gown, N95 or P100 respirator, and eye protection was used during every encounter with this patient. FINAL IMPRESSION      1. Left lower quadrant abdominal pain    2. Adrenal mass 1 cm to 4 cm in diameter [facility]) Stable         DISPOSITION/PLAN   DISPOSITION Decision To Discharge 10/12/2020 04:05:32 PM      PATIENT REFERRED TO:  ABRAM Siegel CNP  [address]  [phone]    Schedule an appointment as soon as possible for a visit in 3 days        DISCHARGE MEDICATIONS:  Current Discharge Medication List        Controlled Substances Monitoring:     RX Monitoring 12/14/2018   Attestation The Prescription Monitoring Report for this patient was reviewed today. Periodic Controlled Substance Monitoring No signs of potential drug abuse or diversion identified.        (Please note that portions of this note were completed with a voice recognition program.  Efforts were made to edit the dictations but occasionally words are mis-transcribed.)    Electronically signed by ABRAM Yanes CNP on 10/12/2020 at 4:10 PM               ABRAM Yanes CNP  10/12/20 9899

## 2020-11-10 ENCOUNTER — HOSPITAL ENCOUNTER (OUTPATIENT)
Age: 50
Discharge: HOME | End: 2020-11-10
Payer: MEDICAID

## 2020-11-10 LAB
-: NORMAL
AMORPHOUS: NORMAL
ANION GAP SERPL CALCULATED.3IONS-SCNC: 11 MMOL/L (ref 9–17)
BACTERIA: NORMAL
BILIRUBIN URINE: NEGATIVE
BUN BLDV-MCNC: 16 MG/DL (ref 6–20)
BUN/CREAT BLD: 16 (ref 9–20)
CALCIUM SERPL-MCNC: 9.6 MG/DL (ref 8.6–10.4)
CASTS UA: NORMAL /LPF
CHLORIDE BLD-SCNC: 98 MMOL/L (ref 98–107)
CO2: 23 MMOL/L (ref 20–31)
COLOR: YELLOW
COMMENT UA: ABNORMAL
CREAT SERPL-MCNC: 0.99 MG/DL (ref 0.5–0.9)
CREATININE URINE: 160.6 MG/DL (ref 28–217)
CRYSTALS, UA: NORMAL /HPF
EPITHELIAL CELLS UA: NORMAL /HPF (ref 0–25)
GFR AFRICAN AMERICAN: >60 ML/MIN
GFR NON-AFRICAN AMERICAN: 60 ML/MIN
GFR SERPL CREATININE-BSD FRML MDRD: ABNORMAL ML/MIN/{1.73_M2}
GFR SERPL CREATININE-BSD FRML MDRD: ABNORMAL ML/MIN/{1.73_M2}
GLUCOSE BLD-MCNC: 106 MG/DL (ref 70–99)
GLUCOSE URINE: NEGATIVE
HCT VFR BLD CALC: 39.5 % (ref 36.3–47.1)
HEMOGLOBIN: 12.1 G/DL (ref 11.9–15.1)
KETONES, URINE: NEGATIVE
LEUKOCYTE ESTERASE, URINE: NEGATIVE
MAGNESIUM: 1.9 MG/DL (ref 1.6–2.6)
MCH RBC QN AUTO: 27.2 PG (ref 25.2–33.5)
MCHC RBC AUTO-ENTMCNC: 30.6 G/DL (ref 28.4–34.8)
MCV RBC AUTO: 88.8 FL (ref 82.6–102.9)
MUCUS: NORMAL
NITRITE, URINE: NEGATIVE
NRBC AUTOMATED: 0 PER 100 WBC
OTHER OBSERVATIONS UA: NORMAL
PDW BLD-RTO: 14.6 % (ref 11.8–14.4)
PH UA: 6 (ref 5–9)
PHOSPHORUS: 2.2 MG/DL (ref 2.6–4.5)
PLATELET # BLD: 271 K/UL (ref 138–453)
PMV BLD AUTO: 10.7 FL (ref 8.1–13.5)
POTASSIUM SERPL-SCNC: 3.8 MMOL/L (ref 3.7–5.3)
PROTEIN UA: ABNORMAL
PTH INTACT: 75.9 PG/ML (ref 15–65)
RBC # BLD: 4.45 M/UL (ref 3.95–5.11)
RBC UA: NORMAL /HPF (ref 0–2)
RENAL EPITHELIAL, UA: NORMAL /HPF
SODIUM BLD-SCNC: 132 MMOL/L (ref 135–144)
SPECIFIC GRAVITY UA: 1.02 (ref 1.01–1.02)
TOTAL PROTEIN, URINE: 230 MG/DL
TRICHOMONAS: NORMAL
TURBIDITY: CLEAR
URINE HGB: NEGATIVE
URINE TOTAL PROTEIN CREATININE RATIO: 1.43 (ref 0–0.2)
UROBILINOGEN, URINE: NORMAL
WBC # BLD: 8.4 K/UL (ref 3.5–11.3)
WBC UA: NORMAL /HPF (ref 0–5)
YEAST: NORMAL

## 2020-11-10 PROCEDURE — 84156 ASSAY OF PROTEIN URINE: CPT

## 2020-11-10 PROCEDURE — 82610 CYSTATIN C: CPT

## 2020-11-10 PROCEDURE — 84100 ASSAY OF PHOSPHORUS: CPT

## 2020-11-10 PROCEDURE — 80048 BASIC METABOLIC PNL TOTAL CA: CPT

## 2020-11-10 PROCEDURE — 81001 URINALYSIS AUTO W/SCOPE: CPT

## 2020-11-10 PROCEDURE — 83735 ASSAY OF MAGNESIUM: CPT

## 2020-11-10 PROCEDURE — 82088 ASSAY OF ALDOSTERONE: CPT

## 2020-11-10 PROCEDURE — 82570 ASSAY OF URINE CREATININE: CPT

## 2020-11-10 PROCEDURE — 83970 ASSAY OF PARATHORMONE: CPT

## 2020-11-10 PROCEDURE — 84244 ASSAY OF RENIN: CPT

## 2020-11-10 PROCEDURE — 85027 COMPLETE CBC AUTOMATED: CPT

## 2020-11-10 PROCEDURE — 83835 ASSAY OF METANEPHRINES: CPT

## 2020-11-12 LAB
ALDOSTERONE COMMENT: NORMAL
ALDOSTERONE: 4.9 NG/DL
RENIN ACTIVITY: 2.3 NG/ML/HR
RENIN COMMENT: NORMAL

## 2020-11-13 LAB
METANEPH/PLASMA INTERP: NORMAL
METANEPHRINE: 0.11 NMOL/L (ref 0–0.49)
NORMETANEPHRINE PLASMA: 0.83 NMOL/L (ref 0–0.89)

## 2020-11-30 LAB
SEND OUT REPORT: NORMAL
TEST NAME: NORMAL

## 2021-01-08 ENCOUNTER — HOSPITAL ENCOUNTER (OUTPATIENT)
Dept: LAB | Age: 51
Discharge: HOME | End: 2021-01-08
Payer: MEDICAID

## 2021-01-08 LAB
-: ABNORMAL
AMORPHOUS: ABNORMAL
ANION GAP SERPL CALCULATED.3IONS-SCNC: 13 MMOL/L (ref 9–17)
BACTERIA: ABNORMAL
BILIRUBIN URINE: NEGATIVE
BUN BLDV-MCNC: 12 MG/DL (ref 6–20)
BUN/CREAT BLD: 11 (ref 9–20)
CALCIUM SERPL-MCNC: 9.5 MG/DL (ref 8.6–10.4)
CASTS UA: ABNORMAL /LPF
CHLORIDE BLD-SCNC: 99 MMOL/L (ref 98–107)
CO2: 22 MMOL/L (ref 20–31)
COLOR: YELLOW
COMMENT UA: ABNORMAL
CREAT SERPL-MCNC: 1.05 MG/DL (ref 0.5–0.9)
CREATININE URINE: 165.9 MG/DL (ref 28–217)
CRYSTALS, UA: ABNORMAL /HPF
EPITHELIAL CELLS UA: ABNORMAL /HPF (ref 0–25)
GFR AFRICAN AMERICAN: >60 ML/MIN
GFR NON-AFRICAN AMERICAN: 56 ML/MIN
GFR SERPL CREATININE-BSD FRML MDRD: ABNORMAL ML/MIN/{1.73_M2}
GFR SERPL CREATININE-BSD FRML MDRD: ABNORMAL ML/MIN/{1.73_M2}
GLUCOSE BLD-MCNC: 102 MG/DL (ref 70–99)
GLUCOSE URINE: NEGATIVE
HCT VFR BLD CALC: 41.6 % (ref 36.3–47.1)
HEMOGLOBIN: 12.6 G/DL (ref 11.9–15.1)
KETONES, URINE: NEGATIVE
LEUKOCYTE ESTERASE, URINE: NEGATIVE
MAGNESIUM: 1.9 MG/DL (ref 1.6–2.6)
MCH RBC QN AUTO: 27.3 PG (ref 25.2–33.5)
MCHC RBC AUTO-ENTMCNC: 30.3 G/DL (ref 28.4–34.8)
MCV RBC AUTO: 90 FL (ref 82.6–102.9)
MUCUS: ABNORMAL
NITRITE, URINE: NEGATIVE
NRBC AUTOMATED: 0 PER 100 WBC
OTHER OBSERVATIONS UA: ABNORMAL
PDW BLD-RTO: 15.1 % (ref 11.8–14.4)
PH UA: 6.5 (ref 5–9)
PHOSPHORUS: 2.8 MG/DL (ref 2.6–4.5)
PLATELET # BLD: 290 K/UL (ref 138–453)
PMV BLD AUTO: 10.6 FL (ref 8.1–13.5)
POTASSIUM SERPL-SCNC: 3.4 MMOL/L (ref 3.7–5.3)
PROTEIN UA: ABNORMAL
PTH INTACT: 82.62 PG/ML (ref 15–65)
RBC # BLD: 4.62 M/UL (ref 3.95–5.11)
RBC UA: ABNORMAL /HPF (ref 0–2)
RENAL EPITHELIAL, UA: ABNORMAL /HPF
SODIUM BLD-SCNC: 134 MMOL/L (ref 135–144)
SPECIFIC GRAVITY UA: 1.02 (ref 1.01–1.02)
TOTAL PROTEIN, URINE: 182 MG/DL
TRICHOMONAS: ABNORMAL
TURBIDITY: CLEAR
URINE HGB: NEGATIVE
URINE TOTAL PROTEIN CREATININE RATIO: 1.1 (ref 0–0.2)
UROBILINOGEN, URINE: NORMAL
WBC # BLD: 7.5 K/UL (ref 3.5–11.3)
WBC UA: ABNORMAL /HPF (ref 0–5)
YEAST: ABNORMAL

## 2021-01-08 PROCEDURE — 81001 URINALYSIS AUTO W/SCOPE: CPT

## 2021-01-08 PROCEDURE — 80048 BASIC METABOLIC PNL TOTAL CA: CPT

## 2021-01-08 PROCEDURE — 82570 ASSAY OF URINE CREATININE: CPT

## 2021-01-08 PROCEDURE — 36415 COLL VENOUS BLD VENIPUNCTURE: CPT

## 2021-01-08 PROCEDURE — 82626 DEHYDROEPIANDROSTERONE: CPT

## 2021-01-08 PROCEDURE — 83735 ASSAY OF MAGNESIUM: CPT

## 2021-01-08 PROCEDURE — 83970 ASSAY OF PARATHORMONE: CPT

## 2021-01-08 PROCEDURE — 85027 COMPLETE CBC AUTOMATED: CPT

## 2021-01-08 PROCEDURE — 84156 ASSAY OF PROTEIN URINE: CPT

## 2021-01-08 PROCEDURE — 84100 ASSAY OF PHOSPHORUS: CPT

## 2021-01-13 LAB — DHEA: 0.49 NG/ML (ref 0.63–4.7)

## 2021-05-25 ENCOUNTER — HOSPITAL ENCOUNTER (OUTPATIENT)
Age: 51
Discharge: HOME | End: 2021-05-25
Payer: MEDICAID

## 2021-05-25 LAB
-: ABNORMAL
AMORPHOUS: ABNORMAL
ANION GAP SERPL CALCULATED.3IONS-SCNC: 12 MMOL/L (ref 9–17)
BACTERIA: ABNORMAL
BILIRUBIN URINE: NEGATIVE
BUN BLDV-MCNC: 13 MG/DL (ref 6–20)
BUN/CREAT BLD: 12 (ref 9–20)
CALCIUM SERPL-MCNC: 9.8 MG/DL (ref 8.6–10.4)
CASTS UA: ABNORMAL /LPF
CHLORIDE BLD-SCNC: 104 MMOL/L (ref 98–107)
CO2: 20 MMOL/L (ref 20–31)
COLOR: YELLOW
COMMENT UA: ABNORMAL
CREAT SERPL-MCNC: 1.05 MG/DL (ref 0.5–0.9)
CREATININE URINE: 279.3 MG/DL (ref 28–217)
CRYSTALS, UA: ABNORMAL /HPF
EPITHELIAL CELLS UA: ABNORMAL /HPF (ref 0–25)
GFR AFRICAN AMERICAN: >60 ML/MIN
GFR NON-AFRICAN AMERICAN: 56 ML/MIN
GFR SERPL CREATININE-BSD FRML MDRD: ABNORMAL ML/MIN/{1.73_M2}
GFR SERPL CREATININE-BSD FRML MDRD: ABNORMAL ML/MIN/{1.73_M2}
GLUCOSE BLD-MCNC: 107 MG/DL (ref 70–99)
GLUCOSE URINE: NEGATIVE
HCT VFR BLD CALC: 40.2 % (ref 36.3–47.1)
HEMOGLOBIN: 12.3 G/DL (ref 11.9–15.1)
KETONES, URINE: NEGATIVE
LEUKOCYTE ESTERASE, URINE: NEGATIVE
MAGNESIUM: 1.8 MG/DL (ref 1.6–2.6)
MCH RBC QN AUTO: 27.7 PG (ref 25.2–33.5)
MCHC RBC AUTO-ENTMCNC: 30.6 G/DL (ref 28.4–34.8)
MCV RBC AUTO: 90.5 FL (ref 82.6–102.9)
MUCUS: ABNORMAL
NITRITE, URINE: NEGATIVE
NRBC AUTOMATED: 0 PER 100 WBC
OTHER OBSERVATIONS UA: ABNORMAL
PDW BLD-RTO: 14.7 % (ref 11.8–14.4)
PH UA: 7 (ref 5–9)
PHOSPHORUS: 2.3 MG/DL (ref 2.6–4.5)
PLATELET # BLD: 274 K/UL (ref 138–453)
PMV BLD AUTO: 10.4 FL (ref 8.1–13.5)
POTASSIUM SERPL-SCNC: 4.2 MMOL/L (ref 3.7–5.3)
PROTEIN UA: ABNORMAL
RBC # BLD: 4.44 M/UL (ref 3.95–5.11)
RBC UA: ABNORMAL /HPF (ref 0–2)
RENAL EPITHELIAL, UA: ABNORMAL /HPF
SODIUM BLD-SCNC: 136 MMOL/L (ref 135–144)
SPECIFIC GRAVITY UA: 1.02 (ref 1.01–1.02)
TOTAL PROTEIN, URINE: 491 MG/DL
TRICHOMONAS: ABNORMAL
TURBIDITY: CLEAR
URINE HGB: NEGATIVE
URINE TOTAL PROTEIN CREATININE RATIO: 1.76 (ref 0–0.2)
UROBILINOGEN, URINE: ABNORMAL
WBC # BLD: 6.3 K/UL (ref 3.5–11.3)
WBC UA: ABNORMAL /HPF (ref 0–5)
YEAST: ABNORMAL

## 2021-05-25 PROCEDURE — 83735 ASSAY OF MAGNESIUM: CPT

## 2021-05-25 PROCEDURE — 84156 ASSAY OF PROTEIN URINE: CPT

## 2021-05-25 PROCEDURE — 81001 URINALYSIS AUTO W/SCOPE: CPT

## 2021-05-25 PROCEDURE — 85027 COMPLETE CBC AUTOMATED: CPT

## 2021-05-25 PROCEDURE — 83550 IRON BINDING TEST: CPT

## 2021-05-25 PROCEDURE — 36415 COLL VENOUS BLD VENIPUNCTURE: CPT

## 2021-05-25 PROCEDURE — 83970 ASSAY OF PARATHORMONE: CPT

## 2021-05-25 PROCEDURE — 80048 BASIC METABOLIC PNL TOTAL CA: CPT

## 2021-05-25 PROCEDURE — 82570 ASSAY OF URINE CREATININE: CPT

## 2021-05-25 PROCEDURE — 84100 ASSAY OF PHOSPHORUS: CPT

## 2021-05-25 PROCEDURE — 83540 ASSAY OF IRON: CPT

## 2021-05-26 LAB
IRON SATURATION: 15 % (ref 20–55)
IRON: 36 UG/DL (ref 37–145)
PTH INTACT: 93.89 PG/ML (ref 15–65)
TOTAL IRON BINDING CAPACITY: 248 UG/DL (ref 250–450)
UNSATURATED IRON BINDING CAPACITY: 212 UG/DL (ref 112–347)

## 2021-07-01 ENCOUNTER — HOSPITAL ENCOUNTER (OUTPATIENT)
Age: 51
Setting detail: SPECIMEN
Discharge: HOME | End: 2021-07-01
Payer: MEDICAID

## 2021-07-01 LAB
CHOLESTEROL, FASTING: 220 MG/DL
CHOLESTEROL/HDL RATIO: 3.3
HDLC SERPL-MCNC: 66 MG/DL
LDL CHOLESTEROL: 139 MG/DL (ref 0–130)
T3 TOTAL: 65 NG/DL (ref 60–181)
T4 TOTAL: 9.2 UG/DL (ref 4.5–10.9)
TRIGLYCERIDE, FASTING: 74 MG/DL
TSH SERPL DL<=0.05 MIU/L-ACNC: 1.72 MIU/L (ref 0.3–5)
VITAMIN B-12: 491 PG/ML (ref 232–1245)
VLDLC SERPL CALC-MCNC: ABNORMAL MG/DL (ref 1–30)

## 2021-07-04 LAB — VITAMIN D 1,25-DIHYDROXY: 39.9 PG/ML (ref 19.9–79.3)

## 2021-12-23 ENCOUNTER — HOSPITAL ENCOUNTER (OUTPATIENT)
Age: 51
Discharge: HOME | End: 2021-12-23
Payer: MEDICAID

## 2021-12-23 LAB
ABSOLUTE EOS #: <0.03 K/UL (ref 0–0.44)
ABSOLUTE IMMATURE GRANULOCYTE: <0.03 K/UL (ref 0–0.3)
ABSOLUTE LYMPH #: 2.08 K/UL (ref 1.1–3.7)
ABSOLUTE MONO #: 0.53 K/UL (ref 0.1–1.2)
ALBUMIN SERPL-MCNC: 4.1 G/DL (ref 3.5–5.2)
ALBUMIN/GLOBULIN RATIO: 1.1 (ref 1–2.5)
ALP BLD-CCNC: 67 U/L (ref 35–104)
ALT SERPL-CCNC: 11 U/L (ref 5–33)
ANION GAP SERPL CALCULATED.3IONS-SCNC: 13 MMOL/L (ref 9–17)
AST SERPL-CCNC: 14 U/L
BASOPHILS # BLD: 0 % (ref 0–2)
BASOPHILS ABSOLUTE: 0.03 K/UL (ref 0–0.2)
BILIRUB SERPL-MCNC: 0.64 MG/DL (ref 0.3–1.2)
BUN BLDV-MCNC: 12 MG/DL (ref 6–20)
BUN/CREAT BLD: 12 (ref 9–20)
CALCIUM SERPL-MCNC: 9.8 MG/DL (ref 8.6–10.4)
CHLORIDE BLD-SCNC: 101 MMOL/L (ref 98–107)
CHOLESTEROL/HDL RATIO: 3.3
CHOLESTEROL: 266 MG/DL
CO2: 24 MMOL/L (ref 20–31)
CREAT SERPL-MCNC: 1 MG/DL (ref 0.5–0.9)
DIFFERENTIAL TYPE: ABNORMAL
EOSINOPHILS RELATIVE PERCENT: 0 % (ref 1–4)
FERRITIN: 90 UG/L (ref 13–150)
GFR AFRICAN AMERICAN: >60 ML/MIN
GFR NON-AFRICAN AMERICAN: 59 ML/MIN
GFR SERPL CREATININE-BSD FRML MDRD: ABNORMAL ML/MIN/{1.73_M2}
GFR SERPL CREATININE-BSD FRML MDRD: ABNORMAL ML/MIN/{1.73_M2}
GLUCOSE BLD-MCNC: 103 MG/DL (ref 70–99)
HCT VFR BLD CALC: 40.4 % (ref 36.3–47.1)
HDLC SERPL-MCNC: 80 MG/DL
HEMOGLOBIN: 12.4 G/DL (ref 11.9–15.1)
IMMATURE GRANULOCYTES: 0 %
IRON SATURATION: 19 % (ref 20–55)
IRON: 44 UG/DL (ref 37–145)
LDL CHOLESTEROL: 171 MG/DL (ref 0–130)
LYMPHOCYTES # BLD: 23 % (ref 24–43)
MCH RBC QN AUTO: 27.6 PG (ref 25.2–33.5)
MCHC RBC AUTO-ENTMCNC: 30.7 G/DL (ref 28.4–34.8)
MCV RBC AUTO: 89.8 FL (ref 82.6–102.9)
MONOCYTES # BLD: 6 % (ref 3–12)
NRBC AUTOMATED: 0 PER 100 WBC
PDW BLD-RTO: 15.6 % (ref 11.8–14.4)
PLATELET # BLD: 296 K/UL (ref 138–453)
PLATELET ESTIMATE: ABNORMAL
PMV BLD AUTO: 11.3 FL (ref 8.1–13.5)
POTASSIUM SERPL-SCNC: 4.1 MMOL/L (ref 3.7–5.3)
RBC # BLD: 4.5 M/UL (ref 3.95–5.11)
RBC # BLD: ABNORMAL 10*6/UL
SEG NEUTROPHILS: 71 % (ref 36–65)
SEGMENTED NEUTROPHILS ABSOLUTE COUNT: 6.36 K/UL (ref 1.5–8.1)
SODIUM BLD-SCNC: 138 MMOL/L (ref 135–144)
TOTAL IRON BINDING CAPACITY: 232 UG/DL (ref 250–450)
TOTAL PROTEIN: 7.8 G/DL (ref 6.4–8.3)
TRIGL SERPL-MCNC: 74 MG/DL
TSH SERPL DL<=0.05 MIU/L-ACNC: 0.53 MIU/L (ref 0.3–5)
UNSATURATED IRON BINDING CAPACITY: 188 UG/DL (ref 112–347)
VLDLC SERPL CALC-MCNC: ABNORMAL MG/DL (ref 1–30)
WBC # BLD: 9 K/UL (ref 3.5–11.3)
WBC # BLD: ABNORMAL 10*3/UL

## 2021-12-23 PROCEDURE — 83540 ASSAY OF IRON: CPT

## 2021-12-23 PROCEDURE — 84480 ASSAY TRIIODOTHYRONINE (T3): CPT

## 2021-12-23 PROCEDURE — 80053 COMPREHEN METABOLIC PANEL: CPT

## 2021-12-23 PROCEDURE — 82607 VITAMIN B-12: CPT

## 2021-12-23 PROCEDURE — 83550 IRON BINDING TEST: CPT

## 2021-12-23 PROCEDURE — 80061 LIPID PANEL: CPT

## 2021-12-23 PROCEDURE — 85025 COMPLETE CBC W/AUTO DIFF WBC: CPT

## 2021-12-23 PROCEDURE — 82728 ASSAY OF FERRITIN: CPT

## 2021-12-23 PROCEDURE — 36415 COLL VENOUS BLD VENIPUNCTURE: CPT

## 2021-12-23 PROCEDURE — 84436 ASSAY OF TOTAL THYROXINE: CPT

## 2021-12-23 PROCEDURE — 84443 ASSAY THYROID STIM HORMONE: CPT

## 2021-12-24 LAB
T3 TOTAL: 52 NG/DL (ref 60–181)
T4 TOTAL: 6.8 UG/DL (ref 4.5–10.9)
VITAMIN B-12: 493 PG/ML (ref 232–1245)

## 2022-01-13 ENCOUNTER — APPOINTMENT (OUTPATIENT)
Dept: GENERAL RADIOLOGY | Age: 52
End: 2022-01-13
Payer: MEDICAID

## 2022-01-13 ENCOUNTER — HOSPITAL ENCOUNTER (EMERGENCY)
Age: 52
Discharge: HOME | End: 2022-01-13
Payer: MEDICAID

## 2022-01-13 VITALS
OXYGEN SATURATION: 98 % | SYSTOLIC BLOOD PRESSURE: 188 MMHG | TEMPERATURE: 96.2 F | RESPIRATION RATE: 24 BRPM | HEART RATE: 84 BPM | DIASTOLIC BLOOD PRESSURE: 119 MMHG

## 2022-01-13 DIAGNOSIS — Z20.822 EXPOSURE TO COVID-19 VIRUS: Primary | ICD-10-CM

## 2022-01-13 LAB
SARS-COV-2, RAPID: DETECTED
SPECIMEN DESCRIPTION: ABNORMAL

## 2022-01-13 PROCEDURE — 87635 SARS-COV-2 COVID-19 AMP PRB: CPT

## 2022-01-13 PROCEDURE — 71045 X-RAY EXAM CHEST 1 VIEW: CPT

## 2022-01-13 PROCEDURE — 96374 THER/PROPH/DIAG INJ IV PUSH: CPT

## 2022-01-13 PROCEDURE — 99283 EMERGENCY DEPT VISIT LOW MDM: CPT

## 2022-01-13 PROCEDURE — 6360000002 HC RX W HCPCS: Performed by: PHYSICIAN ASSISTANT

## 2022-01-13 PROCEDURE — C9803 HOPD COVID-19 SPEC COLLECT: HCPCS

## 2022-01-13 RX ORDER — ASCORBIC ACID 500 MG
500 TABLET ORAL 2 TIMES DAILY
Qty: 14 TABLET | Refills: 0 | Status: SHIPPED | OUTPATIENT
Start: 2022-01-13 | End: 2022-09-08

## 2022-01-13 RX ORDER — ZINC SULFATE 50(220)MG
50 CAPSULE ORAL DAILY
Qty: 7 CAPSULE | Refills: 0 | Status: SHIPPED | OUTPATIENT
Start: 2022-01-13 | End: 2022-05-24

## 2022-01-13 RX ORDER — GUAIFENESIN 600 MG/1
600 TABLET, EXTENDED RELEASE ORAL 2 TIMES DAILY
Qty: 20 TABLET | Refills: 0 | Status: SHIPPED | OUTPATIENT
Start: 2022-01-13 | End: 2022-01-23

## 2022-01-13 RX ORDER — DEXAMETHASONE SODIUM PHOSPHATE 10 MG/ML
10 INJECTION INTRAMUSCULAR; INTRAVENOUS ONCE
Status: COMPLETED | OUTPATIENT
Start: 2022-01-13 | End: 2022-01-13

## 2022-01-13 RX ORDER — ALBUTEROL SULFATE 90 UG/1
2 AEROSOL, METERED RESPIRATORY (INHALATION) 4 TIMES DAILY PRN
Qty: 54 G | Refills: 1 | Status: SHIPPED | OUTPATIENT
Start: 2022-01-13 | End: 2022-05-18 | Stop reason: SDUPTHER

## 2022-01-13 RX ADMIN — DEXAMETHASONE SODIUM PHOSPHATE 10 MG: 10 INJECTION INTRAMUSCULAR; INTRAVENOUS at 18:17

## 2022-01-13 ASSESSMENT — ENCOUNTER SYMPTOMS
GASTROINTESTINAL NEGATIVE: 1
EYES NEGATIVE: 1
WHEEZING: 1
SHORTNESS OF BREATH: 1
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
## 2022-04-20 ENCOUNTER — TELEPHONE (OUTPATIENT)
Dept: PULMONOLOGY | Age: 52
End: 2022-04-20

## 2022-04-20 ENCOUNTER — OFFICE VISIT (OUTPATIENT)
Dept: PULMONOLOGY | Age: 52
End: 2022-04-20
Payer: MEDICAID

## 2022-04-20 VITALS
BODY MASS INDEX: 47.09 KG/M2 | SYSTOLIC BLOOD PRESSURE: 188 MMHG | TEMPERATURE: 96.1 F | DIASTOLIC BLOOD PRESSURE: 102 MMHG | OXYGEN SATURATION: 97 % | RESPIRATION RATE: 16 BRPM | HEART RATE: 118 BPM | WEIGHT: 293 LBS | HEIGHT: 66 IN

## 2022-04-20 DIAGNOSIS — Z28.310 UNVACCINATED FOR COVID-19: ICD-10-CM

## 2022-04-20 DIAGNOSIS — Z98.84 HISTORY OF BARIATRIC SURGERY: ICD-10-CM

## 2022-04-20 DIAGNOSIS — U09.9 POST-COVID-19 SYNDROME MANIFESTING AS CHRONIC DYSPNEA: Primary | ICD-10-CM

## 2022-04-20 DIAGNOSIS — J45.50 SEVERE PERSISTENT ASTHMA WITHOUT COMPLICATION: ICD-10-CM

## 2022-04-20 DIAGNOSIS — E66.01 CLASS 3 SEVERE OBESITY DUE TO EXCESS CALORIES WITH SERIOUS COMORBIDITY AND BODY MASS INDEX (BMI) OF 60.0 TO 69.9 IN ADULT (HCC): ICD-10-CM

## 2022-04-20 DIAGNOSIS — R35.0 URINARY FREQUENCY: ICD-10-CM

## 2022-04-20 DIAGNOSIS — R06.09 POST-COVID-19 SYNDROME MANIFESTING AS CHRONIC DYSPNEA: Primary | ICD-10-CM

## 2022-04-20 DIAGNOSIS — G47.01 INSOMNIA DUE TO MEDICAL CONDITION: ICD-10-CM

## 2022-04-20 PROCEDURE — 3017F COLORECTAL CA SCREEN DOC REV: CPT | Performed by: INTERNAL MEDICINE

## 2022-04-20 PROCEDURE — G8417 CALC BMI ABV UP PARAM F/U: HCPCS | Performed by: INTERNAL MEDICINE

## 2022-04-20 PROCEDURE — 99204 OFFICE O/P NEW MOD 45 MIN: CPT | Performed by: INTERNAL MEDICINE

## 2022-04-20 PROCEDURE — 1036F TOBACCO NON-USER: CPT | Performed by: INTERNAL MEDICINE

## 2022-04-20 PROCEDURE — G8427 DOCREV CUR MEDS BY ELIG CLIN: HCPCS | Performed by: INTERNAL MEDICINE

## 2022-04-20 RX ORDER — MONTELUKAST SODIUM 10 MG/1
10 TABLET ORAL NIGHTLY
Qty: 30 TABLET | Refills: 11 | Status: SHIPPED | OUTPATIENT
Start: 2022-04-20 | End: 2022-05-18 | Stop reason: SDUPTHER

## 2022-04-20 RX ORDER — BUDESONIDE AND FORMOTEROL FUMARATE DIHYDRATE 160; 4.5 UG/1; UG/1
2 AEROSOL RESPIRATORY (INHALATION) 2 TIMES DAILY
Qty: 1 EACH | Refills: 11 | Status: SHIPPED | OUTPATIENT
Start: 2022-04-20 | End: 2022-04-21 | Stop reason: CLARIF

## 2022-04-20 ASSESSMENT — ENCOUNTER SYMPTOMS
COUGH: 1
BACK PAIN: 1
SHORTNESS OF BREATH: 1
GASTROINTESTINAL NEGATIVE: 1
EYES NEGATIVE: 1
ALLERGIC/IMMUNOLOGIC NEGATIVE: 1

## 2022-04-20 ASSESSMENT — PATIENT HEALTH QUESTIONNAIRE - PHQ9
2. FEELING DOWN, DEPRESSED OR HOPELESS: 1
1. LITTLE INTEREST OR PLEASURE IN DOING THINGS: 0
SUM OF ALL RESPONSES TO PHQ9 QUESTIONS 1 & 2: 1
SUM OF ALL RESPONSES TO PHQ QUESTIONS 1-9: 1

## 2022-04-20 NOTE — PROGRESS NOTES
Subjective:      Patient ID: Margot Alonzo is a 49 y.o. female being seen in my clinic for   Chief Complaint   Patient presents with    New Patient     Evalution of cough due to COVID 1/13/22 +       HPI  New patient visit for post COVID syndrome. Patient well-known to us from previous encounters for sleep apnea and asthma. Patient is unvaccinated. States that in mid January she developed cough, shortness of breath, diarrhea, and fever. Presented to emergency department at [facility].  Chest x-ray showed bilateral patchy infiltrates. COVID PCR was positive. Discharged home. Treated with steroids and antibiotics. Since then she reports persistent dyspnea, cough, fatigue, and malaise. Remains unvaccinated. Uses montelukast and albuterol. Does not seem to help much. No follow-up chest x-ray. Patient has a history of extreme obesity. Previously had bariatric surgery. In 2017 lost nearly 100 pounds. States that she has gained some of her weight back. Suffers from severe lymphedema. Previously on CPAP for sleep apnea although had a repeat sleep study post bariatric surgery which showed no clinically significant sleep apnea. Denies disturbed sleep or excessive daytime sleepiness although reports insomnia since COVID. Reportedly awakens frequently at night to urinate. Also frequency throughout the day.     Current Outpatient Medications   Medication Sig Dispense Refill    budesonide-formoterol (SYMBICORT) 160-4.5 MCG/ACT AERO Inhale 2 puffs into the lungs 2 times daily 1 each 11    montelukast (SINGULAIR) 10 MG tablet Take 1 tablet by mouth nightly 30 tablet 11    ascorbic acid (VITAMIN C) 500 MG tablet Take 1 tablet by mouth 2 times daily for 7 days 14 tablet 0    albuterol sulfate HFA (VENTOLIN HFA) 108 (90 Base) MCG/ACT inhaler Inhale 2 puffs into the lungs 4 times daily as needed for Wheezing 54 g 1    atorvastatin (LIPITOR) 20 MG tablet Take 1 tablet by mouth daily 30 tablet 1  gabapentin (NEURONTIN) 100 MG capsule take 3 capsules by mouth at bedtime. (Patient taking differently: Take 100 mg by mouth 3 times daily.  100mg in am and mid day, take 3 capsules by mouth at bedtime.) 90 capsule 2    mometasone (ASMANEX) 100 MCG/ACT AERO inhaler Inhale 2 puffs into the lungs 2 times daily 1 Inhaler 5    tiZANidine (ZANAFLEX) 4 MG tablet Take 4 mg by mouth as needed  0    ranitidine (ZANTAC) 300 MG tablet Take 1 tablet by mouth nightly 30 tablet 3    metoclopramide (REGLAN) 5 MG tablet Take 1 tablet by mouth 3 times daily 120 tablet 3    pantoprazole (PROTONIX) 40 MG tablet take 1 tablet by mouth once daily 30 tablet 3    fluticasone (FLONASE) 50 MCG/ACT nasal spray 2 sprays by Nasal route daily 16 g 11    docusate sodium (COLACE) 100 MG capsule Take 100 mg by mouth 2 times daily      potassium chloride (KLOR-CON) 10 MEQ extended release tablet Take 10 mEq by mouth daily  1    nabumetone (RELAFEN) 500 MG tablet Take 500 mg by mouth 2 times daily      Cholecalciferol (VITAMIN D PO) Take 1 tablet by mouth every morning       vitamin B-1 (THIAMINE) 100 MG tablet Take 100 mg by mouth daily      ferrous sulfate 325 (65 FE) MG tablet Take 325 mg by mouth daily (with breakfast)      Multiple Vitamin (MVI, BARIATRIC ADVANTAGE MULTI-FORMULA, CHEW TAB) Take 1 tablet by mouth 3 times daily BA caps with iron -3 caps daily      Calcium Citrate-Vitamin D (CALCIUM CITRATE + D PO) Take 500 mg by mouth 3 times daily      albuterol (PROVENTIL) (2.5 MG/3ML) 0.083% nebulizer solution INHALE CONTENTS OF 1 VIAL BY MOUTH VIA NEBULIZER EVERY 6 HOURS AS NEEDED 360 mL 11    spironolactone-hydrochlorothiazide (ALDACTAZIDE) 25-25 MG per tablet TAKE 1 TABLET BY MOUTH ONCE DAILY (Patient taking differently: TAKE 1 TABLET BY MOUTH TWICE DAILY) 30 tablet 3    zinc sulfate (ZINCATE) 220 (50 Zn) MG capsule Take 1 capsule by mouth daily for 7 days 7 capsule 0    EPINEPHrine (EPIPEN 2-ARNALDO) 0.3 MG/0.3ML SOAJ injection Inject 0.3 mg into the muscle as needed Use as directed for allergic reaction      Dietary Management Product (VASCULERA) TABS Take 630 mg by mouth daily (Patient not taking: Reported on 4/20/2022)      silver sulfADIAZINE (SILVADENE) 1 % cream Apply  topically 2 times daily as needed. Apply topically daily. (Patient not taking: Reported on 4/20/2022) 100 g 1     No current facility-administered medications for this visit. Family History   Problem Relation Age of Onset    Cancer Mother         uterine    Heart Disease Mother     High Blood Pressure Mother     Stroke Mother     Arthritis Mother     Depression Mother     Stroke Father     High Blood Pressure Father     Diabetes Father     High Cholesterol Father     Sickle Cell Anemia Maternal Aunt         aunt has the trait    Kidney Disease Sister         KIDNEY FAILURE    Kidney Disease Sister         SIDS-2 MONTHS OLD       Social History     Tobacco Use    Smoking status: Never Smoker    Smokeless tobacco: Never Used   Substance Use Topics    Alcohol use: No    Drug use: No       Review of Systems   Constitutional: Negative. HENT: Negative. Eyes: Negative. Respiratory: Positive for cough and shortness of breath. Cardiovascular: Positive for leg swelling. Gastrointestinal: Negative. Genitourinary: Positive for urgency. Musculoskeletal: Positive for arthralgias, back pain and gait problem. Allergic/Immunologic: Negative. Hematological: Negative. Psychiatric/Behavioral: Positive for sleep disturbance. All other systems reviewed and are negative. Objective:     Vitals:    04/20/22 0917 04/20/22 0929   BP: (!) 223/118 (!) 188/102   Pulse: 118    Resp: 16    Temp: 96.1 °F (35.6 °C)    SpO2: 97%    Weight: (!) 398 lb 4.8 oz (180.7 kg)    Height: 5' 6\" (1.676 m)      Physical Exam  Vitals and nursing note reviewed. Constitutional:       General: She is not in acute distress.      Appearance: She is well-developed. She is obese. Comments: Very obese. Uses cane. HENT:      Head: Normocephalic and atraumatic. Nose: Nose normal. No congestion. Mouth/Throat:      Mouth: Mucous membranes are moist.      Pharynx: Oropharynx is clear. No oropharyngeal exudate. Comments: Large tongue , low hanging soft palate and large uvula. Overall pharyngeal orifice moderately decreased    Eyes:      General: No scleral icterus. Conjunctiva/sclera: Conjunctivae normal.      Pupils: Pupils are equal, round, and reactive to light. Neck:      Thyroid: No thyromegaly. Vascular: No JVD. Trachea: No tracheal deviation. Cardiovascular:      Rate and Rhythm: Normal rate and regular rhythm. Heart sounds: Normal heart sounds. No murmur heard. No friction rub. No gallop. Pulmonary:      Effort: Respiratory distress present. Breath sounds: Normal breath sounds. No decreased breath sounds, wheezing, rhonchi or rales. Chest:      Chest wall: No tenderness. There is no dullness to percussion. Abdominal:      General: Bowel sounds are normal. There is no distension. Palpations: Abdomen is soft. There is no mass. Tenderness: There is no abdominal tenderness. Musculoskeletal:         General: No tenderness. Normal range of motion. Cervical back: Neck supple. Right lower leg: Edema present. Left lower leg: Edema present. Comments: Grade 4 lymphedema with atrophic change. Lymphadenopathy:      Cervical: No cervical adenopathy. Skin:     General: Skin is warm and dry. Coloration: Skin is not pale. Findings: No erythema or rash. Neurological:      Mental Status: She is alert and oriented to person, place, and time. Cranial Nerves: No cranial nerve deficit. Motor: No abnormal muscle tone. Coordination: Coordination abnormal.      Deep Tendon Reflexes: Reflexes are normal and symmetric.  Reflexes normal.   Psychiatric:         Behavior: Behavior normal.         Thought Content: Thought content normal.         Judgment: Judgment normal.       Wt Readings from Last 3 Encounters:   04/20/22 (!) 398 lb 4.8 oz (180.7 kg)   10/12/20 (!) 420 lb (190.5 kg)   08/27/20 (!) 420 lb (190.5 kg)     Results for orders placed or performed during the hospital encounter of 01/13/22   COVID-19, Rapid    Specimen: Nasopharyngeal Swab   Result Value Ref Range    Specimen Description . NASOPHARYNGEAL SWAB     SARS-CoV-2, Rapid DETECTED (A) Not Detected       Assessment:      1. 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 Class 3 severe obesity due to excess calories with serious comorbidity and body mass index (BMI) of 60.0 to 69.9 in adult ([address]    3. Unvaccinated for covid-19    4. Severe persistent asthma without complication    5. Insomnia due to medical condition    6. Urinary frequency    7. History of bariatric surgery      Patient Active Problem List   Diagnosis    Pain in joint, lower leg    Myalgia and myositis    Lymphedema    KRISSY (iron deficiency anemia)    HTN, goal below 140/90    DAVIDSON on CPAP    Asthma, chronic    Allergic rhinitis    Vitamin D deficiency    Chronic low back pain    Hyperlipidemia    Cholelithiasis    Arthritis    Thiamine deficiency    Chronic cholecystitis    Ganglion cyst    Closed fracture of acromial process of right scapula    Closed dislocation of glenohumeral joint    Shoulder fracture, right    Morbid obesity with BMI of 60.0-69.9, adult ([address]    LUQ abdominal pain    S/P laparoscopic sleeve gastrectomy    Subluxation of tendon of long head of biceps    Prediabetes    Headaches due to old head injury    Hearing loss    Lumbar stenosis    TBI (traumatic brain injury) ([address]    Hyperparathyroidism ([address]    Iron malabsorption         Plan:      1. Long discussion with patient regarding long COVID syndrome. No specific intervention other than gentle exercise and passage of time.   Generally resolves although may be prolonged. 2. Escalate bronchodilators. Symbicort 1604.5 and Singulair. 3. Pulmonary function studies. 4. Repeat chest x-ray PA and lateral.  5. Weight loss. 6. Encouraged regular exercise. 7. At some point may require repeat sleep study given weight gain and history, however patient not receptive to CPAP at this time. 8. Return in 1 month. Orders Placed This Encounter   Medications    budesonide-formoterol (SYMBICORT) 160-4.5 MCG/ACT AERO     Sig: Inhale 2 puffs into the lungs 2 times daily     Dispense:  1 each     Refill:  11    montelukast (SINGULAIR) 10 MG tablet     Sig: Take 1 tablet by mouth nightly     Dispense:  30 tablet     Refill:  11     Orders Placed This Encounter   Procedures    XR CHEST (2 VW)     Standing Status:   Future     Standing Expiration Date:   4/20/2023     Order Specific Question:   Reason for exam:     Answer:   covid pneumonia in [facility]. Interval change    Full PFT Study With Bronchodilator     If an ABG is needed along with this PFT procedure, please place the appropriate lab order     Standing Status:   Future     Standing Expiration Date:   4/20/2023     Return in about 4 weeks (around 5/18/2022).      Electronically signed by Dejan Hatfield DO on 4/20/2022 at 9:57 AM dietitian/nutrition services

## 2022-04-20 NOTE — TELEPHONE ENCOUNTER
We received a fax that Symbicort is not covered. The fax said covered formulary alternatives may include:  Advair Diskus  Advair HFA  Budesonide  Dulera  Flovent HFA  Pulmicort Flexhaler  Please advise.

## 2022-04-20 NOTE — PATIENT INSTRUCTIONS
SURVEY:    You may be receiving a survey from [email or web address] regarding your visit today. Please complete the survey to enable us to provide the highest quality of care to you and your family. If you cannot score us a very good on any question, please call the office to discuss how we could have made your experience a very good one. Thank you. Please recheck your blood pressure when you go home and make sure you take your prescribed medication if applicable . Please follow up with your PCP or ER if needed. \

## 2022-04-21 NOTE — TELEPHONE ENCOUNTER
Attempted to call Mer Allen to inform her of her insurance not covering Symbicort, Dulera prescribed in its place. Voice mailbox full and unable to accept messages.

## 2022-04-22 ENCOUNTER — HOSPITAL ENCOUNTER (OUTPATIENT)
Age: 52
Setting detail: SPECIMEN
Discharge: HOME | End: 2022-04-22

## 2022-04-23 LAB
ABSOLUTE EOS #: 0.28 K/UL (ref 0–0.44)
ABSOLUTE IMMATURE GRANULOCYTE: <0.03 K/UL (ref 0–0.3)
ABSOLUTE LYMPH #: 1.88 K/UL (ref 1.1–3.7)
ABSOLUTE MONO #: 0.55 K/UL (ref 0.1–1.2)
BASOPHILS # BLD: 1 % (ref 0–2)
BASOPHILS ABSOLUTE: 0.07 K/UL (ref 0–0.2)
EOSINOPHILS RELATIVE PERCENT: 4 % (ref 1–4)
HCT VFR BLD CALC: 32.4 % (ref 36.3–47.1)
HEMOGLOBIN: 9.7 G/DL (ref 11.9–15.1)
IMMATURE GRANULOCYTES: 0 %
LYMPHOCYTES # BLD: 25 % (ref 24–43)
MCH RBC QN AUTO: 30 PG (ref 25.2–33.5)
MCHC RBC AUTO-ENTMCNC: 29.9 G/DL (ref 28.4–34.8)
MCV RBC AUTO: 100.3 FL (ref 82.6–102.9)
MONOCYTES # BLD: 7 % (ref 3–12)
NRBC AUTOMATED: 0 PER 100 WBC
PDW BLD-RTO: 15.7 % (ref 11.8–14.4)
PLATELET # BLD: 255 K/UL (ref 138–453)
PMV BLD AUTO: 11.9 FL (ref 8.1–13.5)
RBC # BLD: 3.23 M/UL (ref 3.95–5.11)
RBC # BLD: ABNORMAL 10*6/UL
SEG NEUTROPHILS: 63 % (ref 36–65)
SEGMENTED NEUTROPHILS ABSOLUTE COUNT: 4.74 K/UL (ref 1.5–8.1)
WBC # BLD: 7.5 K/UL (ref 3.5–11.3)

## 2022-04-27 ENCOUNTER — HOSPITAL ENCOUNTER (OUTPATIENT)
Age: 52
Setting detail: SPECIMEN
Discharge: HOME | End: 2022-04-27

## 2022-04-28 LAB
-: NORMAL
BILIRUBIN URINE: NEGATIVE
CASTS UA: NORMAL /LPF (ref 0–8)
COLOR: YELLOW
EPITHELIAL CELLS UA: NORMAL /HPF (ref 0–5)
GLUCOSE URINE: ABNORMAL
KETONES, URINE: NEGATIVE
LEUKOCYTE ESTERASE, URINE: NEGATIVE
NITRITE, URINE: NEGATIVE
PH UA: 7 (ref 5–8)
PROTEIN UA: ABNORMAL
RBC UA: NORMAL /HPF (ref 0–4)
SPECIFIC GRAVITY UA: 1.02 (ref 1–1.03)
TURBIDITY: CLEAR
URINE HGB: NEGATIVE
UROBILINOGEN, URINE: NORMAL
WBC UA: NORMAL /HPF (ref 0–5)

## 2022-05-02 ENCOUNTER — HOSPITAL ENCOUNTER (OUTPATIENT)
Dept: LAB | Age: 52
Setting detail: SPECIMEN
Discharge: HOME | End: 2022-05-02
Payer: MEDICAID

## 2022-05-02 DIAGNOSIS — Z01.818 PREOP TESTING: Primary | ICD-10-CM

## 2022-05-02 PROCEDURE — C9803 HOPD COVID-19 SPEC COLLECT: HCPCS

## 2022-05-02 PROCEDURE — U0005 INFEC AGEN DETEC AMPLI PROBE: HCPCS

## 2022-05-02 PROCEDURE — U0003 INFECTIOUS AGENT DETECTION BY NUCLEIC ACID (DNA OR RNA); SEVERE ACUTE RESPIRATORY SYNDROME CORONAVIRUS 2 (SARS-COV-2) (CORONAVIRUS DISEASE [COVID-19]), AMPLIFIED PROBE TECHNIQUE, MAKING USE OF HIGH THROUGHPUT TECHNOLOGIES AS DESCRIBED BY CMS-2020-01-R: HCPCS

## 2022-05-03 LAB
SARS-COV-2: NORMAL
SARS-COV-2: NOT DETECTED
SOURCE: NORMAL

## 2022-05-05 ENCOUNTER — HOSPITAL ENCOUNTER (OUTPATIENT)
Dept: PULMONOLOGY | Age: 52
Discharge: HOME | End: 2022-05-05
Payer: MEDICAID

## 2022-05-05 DIAGNOSIS — U09.9 POST-COVID-19 SYNDROME MANIFESTING AS CHRONIC DYSPNEA: ICD-10-CM

## 2022-05-05 DIAGNOSIS — R06.09 POST-COVID-19 SYNDROME MANIFESTING AS CHRONIC DYSPNEA: ICD-10-CM

## 2022-05-05 DIAGNOSIS — J45.50 SEVERE PERSISTENT ASTHMA WITHOUT COMPLICATION: ICD-10-CM

## 2022-05-05 PROCEDURE — 94060 EVALUATION OF WHEEZING: CPT

## 2022-05-05 PROCEDURE — 94729 DIFFUSING CAPACITY: CPT

## 2022-05-05 PROCEDURE — 94664 DEMO&/EVAL PT USE INHALER: CPT

## 2022-05-05 PROCEDURE — 6370000000 HC RX 637 (ALT 250 FOR IP): Performed by: INTERNAL MEDICINE

## 2022-05-05 RX ORDER — ALBUTEROL SULFATE 90 UG/1
4 AEROSOL, METERED RESPIRATORY (INHALATION) ONCE
Status: COMPLETED | OUTPATIENT
Start: 2022-05-05 | End: 2022-05-05

## 2022-05-05 RX ADMIN — ALBUTEROL SULFATE 4 PUFF: 90 AEROSOL, METERED RESPIRATORY (INHALATION) at 10:48

## 2022-05-06 NOTE — PROCEDURES
[address], [place] [address]                               PULMONARY FUNCTION    PATIENT NAME: Refugio Emery                     :        1970  MED REC NO:   [id number]                              ROOM:  ACCOUNT NO:   [de-identified]                           ADMIT DATE: 2022  PROVIDER:     Pattie Kenny    DATE OF PROCEDURE:  2022    REFERRING PROVIDER: Gianna Price DO    PRIMARY CARE PROVIDER: ABRAM Corcoran CNP    FINDINGS:  Spirometry shows FVC is 2.54, 67% of predicted, which could  be suggestive of mild restrictive ventilatory impairment. FEV1 is 2.18,  73% of predicted. FEV1/FVC ratio is normal without evidence of  obstruction. Postbronchodilator, there is no significant improvement in  FEV1 or FVC to suggest bronchospasticity or airway reversibility. The patient was not able to do body box maneuver for lung volume  measurement, as unable to close the door. Diffusion capacity is 20.20, 74% of predicted, suggestive of mild  reduction in diffusion capacity, which could be secondary to  intraparenchymal lung disease, pulmonary vascular disease, emphysema, or  anemia. Clinical correlation is recommended. IMPRESSION:  This pulmonary function test is suggestive of mild  restrictive ventilatory impairment, but lung volumes were not available,  as body box maneuver could not be performed because of the patient's  body habitus. Concomitant obstructive defect cannot be excluded on his  spirometry. Diffusion capacity is mildly reduced, which could be  secondary to intraparenchymal lung disease, pulmonary vascular disease,  emphysema, or anemia. Clinical correlation is recommended.         Yesi Singh    D: 2022 23:12:44       T: 2022 23:15:10     [email or web address]  Job#: [id number]     Doc#: [id number]    CC:    ABRAM Corcoran CNP, DO
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
## 2022-05-18 ENCOUNTER — HOSPITAL ENCOUNTER (OUTPATIENT)
Dept: GENERAL RADIOLOGY | Age: 52
Discharge: HOME | End: 2022-05-20
Payer: MEDICAID

## 2022-05-18 ENCOUNTER — OFFICE VISIT (OUTPATIENT)
Dept: PULMONOLOGY | Age: 52
End: 2022-05-18
Payer: MEDICAID

## 2022-05-18 ENCOUNTER — HOSPITAL ENCOUNTER (OUTPATIENT)
Age: 52
Discharge: HOME | End: 2022-05-18
Payer: MEDICAID

## 2022-05-18 ENCOUNTER — OFFICE VISIT (OUTPATIENT)
Dept: ONCOLOGY | Age: 52
End: 2022-05-18
Payer: MEDICAID

## 2022-05-18 ENCOUNTER — HOSPITAL ENCOUNTER (OUTPATIENT)
Age: 52
Discharge: HOME | End: 2022-05-20
Payer: MEDICAID

## 2022-05-18 VITALS
HEIGHT: 66 IN | OXYGEN SATURATION: 97 % | RESPIRATION RATE: 24 BRPM | BODY MASS INDEX: 47.09 KG/M2 | DIASTOLIC BLOOD PRESSURE: 97 MMHG | HEART RATE: 79 BPM | WEIGHT: 293 LBS | SYSTOLIC BLOOD PRESSURE: 168 MMHG | TEMPERATURE: 97.3 F

## 2022-05-18 VITALS
WEIGHT: 293 LBS | RESPIRATION RATE: 22 BRPM | BODY MASS INDEX: 47.09 KG/M2 | DIASTOLIC BLOOD PRESSURE: 124 MMHG | SYSTOLIC BLOOD PRESSURE: 163 MMHG | HEIGHT: 66 IN | HEART RATE: 84 BPM | TEMPERATURE: 96.6 F

## 2022-05-18 DIAGNOSIS — U09.9 POST-COVID-19 SYNDROME MANIFESTING AS CHRONIC DYSPNEA: ICD-10-CM

## 2022-05-18 DIAGNOSIS — Z28.310 UNVACCINATED FOR COVID-19: ICD-10-CM

## 2022-05-18 DIAGNOSIS — D64.9 ANEMIA, UNSPECIFIED TYPE: ICD-10-CM

## 2022-05-18 DIAGNOSIS — J45.50 SEVERE PERSISTENT ASTHMA WITHOUT COMPLICATION: ICD-10-CM

## 2022-05-18 DIAGNOSIS — K90.9 IRON MALABSORPTION: Primary | ICD-10-CM

## 2022-05-18 DIAGNOSIS — K90.9 IRON MALABSORPTION: ICD-10-CM

## 2022-05-18 DIAGNOSIS — E66.01 CLASS 3 SEVERE OBESITY DUE TO EXCESS CALORIES WITH SERIOUS COMORBIDITY AND BODY MASS INDEX (BMI) OF 60.0 TO 69.9 IN ADULT (HCC): ICD-10-CM

## 2022-05-18 DIAGNOSIS — Z98.84 HISTORY OF BARIATRIC SURGERY: ICD-10-CM

## 2022-05-18 DIAGNOSIS — U09.9 POST-COVID-19 SYNDROME MANIFESTING AS CHRONIC DYSPNEA: Primary | ICD-10-CM

## 2022-05-18 DIAGNOSIS — R06.09 POST-COVID-19 SYNDROME MANIFESTING AS CHRONIC DYSPNEA: Primary | ICD-10-CM

## 2022-05-18 DIAGNOSIS — R06.09 POST-COVID-19 SYNDROME MANIFESTING AS CHRONIC DYSPNEA: ICD-10-CM

## 2022-05-18 LAB
ABSOLUTE EOS #: <0.03 K/UL (ref 0–0.44)
ABSOLUTE IMMATURE GRANULOCYTE: <0.03 K/UL (ref 0–0.3)
ABSOLUTE LYMPH #: 2.32 K/UL (ref 1.1–3.7)
ABSOLUTE MONO #: 0.61 K/UL (ref 0.1–1.2)
BASOPHILS # BLD: 1 % (ref 0–2)
BASOPHILS ABSOLUTE: 0.08 K/UL (ref 0–0.2)
EOSINOPHILS RELATIVE PERCENT: 0 % (ref 1–4)
HCT VFR BLD CALC: 36.2 % (ref 36.3–47.1)
HEMOGLOBIN: 11.1 G/DL (ref 11.9–15.1)
IMMATURE GRANULOCYTES: 0 %
LYMPHOCYTES # BLD: 25 % (ref 24–43)
MCH RBC QN AUTO: 30.1 PG (ref 25.2–33.5)
MCHC RBC AUTO-ENTMCNC: 30.7 G/DL (ref 28.4–34.8)
MCV RBC AUTO: 98.1 FL (ref 82.6–102.9)
MONOCYTES # BLD: 7 % (ref 3–12)
NRBC AUTOMATED: 0 PER 100 WBC
PDW BLD-RTO: 13.4 % (ref 11.8–14.4)
PLATELET # BLD: 343 K/UL (ref 138–453)
PMV BLD AUTO: 10.6 FL (ref 8.1–13.5)
RBC # BLD: 3.69 M/UL (ref 3.95–5.11)
SEG NEUTROPHILS: 67 % (ref 36–65)
SEGMENTED NEUTROPHILS ABSOLUTE COUNT: 6.2 K/UL (ref 1.5–8.1)
WBC # BLD: 9.2 K/UL (ref 3.5–11.3)

## 2022-05-18 PROCEDURE — G8417 CALC BMI ABV UP PARAM F/U: HCPCS | Performed by: INTERNAL MEDICINE

## 2022-05-18 PROCEDURE — 99214 OFFICE O/P EST MOD 30 MIN: CPT | Performed by: NURSE PRACTITIONER

## 2022-05-18 PROCEDURE — G8427 DOCREV CUR MEDS BY ELIG CLIN: HCPCS | Performed by: NURSE PRACTITIONER

## 2022-05-18 PROCEDURE — 99214 OFFICE O/P EST MOD 30 MIN: CPT | Performed by: INTERNAL MEDICINE

## 2022-05-18 PROCEDURE — 3017F COLORECTAL CA SCREEN DOC REV: CPT | Performed by: NURSE PRACTITIONER

## 2022-05-18 PROCEDURE — G8427 DOCREV CUR MEDS BY ELIG CLIN: HCPCS | Performed by: INTERNAL MEDICINE

## 2022-05-18 PROCEDURE — 83540 ASSAY OF IRON: CPT

## 2022-05-18 PROCEDURE — 71046 X-RAY EXAM CHEST 2 VIEWS: CPT

## 2022-05-18 PROCEDURE — 36415 COLL VENOUS BLD VENIPUNCTURE: CPT

## 2022-05-18 PROCEDURE — 85025 COMPLETE CBC W/AUTO DIFF WBC: CPT

## 2022-05-18 PROCEDURE — 3017F COLORECTAL CA SCREEN DOC REV: CPT | Performed by: INTERNAL MEDICINE

## 2022-05-18 PROCEDURE — G8417 CALC BMI ABV UP PARAM F/U: HCPCS | Performed by: NURSE PRACTITIONER

## 2022-05-18 PROCEDURE — 83550 IRON BINDING TEST: CPT

## 2022-05-18 PROCEDURE — 1036F TOBACCO NON-USER: CPT | Performed by: INTERNAL MEDICINE

## 2022-05-18 PROCEDURE — 82728 ASSAY OF FERRITIN: CPT

## 2022-05-18 PROCEDURE — 1036F TOBACCO NON-USER: CPT | Performed by: NURSE PRACTITIONER

## 2022-05-18 RX ORDER — DIPHENHYDRAMINE HYDROCHLORIDE 50 MG/ML
50 INJECTION INTRAMUSCULAR; INTRAVENOUS
Status: CANCELLED | OUTPATIENT
Start: 2022-05-19

## 2022-05-18 RX ORDER — AMLODIPINE BESYLATE 10 MG/1
TABLET ORAL
COMMUNITY
Start: 2022-04-22

## 2022-05-18 RX ORDER — MONTELUKAST SODIUM 10 MG/1
10 TABLET ORAL NIGHTLY
Qty: 30 TABLET | Refills: 11 | Status: SHIPPED | OUTPATIENT
Start: 2022-05-18

## 2022-05-18 RX ORDER — HEPARIN SODIUM (PORCINE) LOCK FLUSH IV SOLN 100 UNIT/ML 100 UNIT/ML
500 SOLUTION INTRAVENOUS PRN
Status: CANCELLED | OUTPATIENT
Start: 2022-05-19

## 2022-05-18 RX ORDER — SODIUM CHLORIDE 9 MG/ML
5-250 INJECTION, SOLUTION INTRAVENOUS PRN
Status: CANCELLED | OUTPATIENT
Start: 2022-05-19

## 2022-05-18 RX ORDER — EPINEPHRINE 1 MG/ML
0.3 INJECTION, SOLUTION, CONCENTRATE INTRAVENOUS PRN
Status: CANCELLED | OUTPATIENT
Start: 2022-05-19

## 2022-05-18 RX ORDER — SODIUM CHLORIDE 9 MG/ML
INJECTION, SOLUTION INTRAVENOUS CONTINUOUS
Status: CANCELLED | OUTPATIENT
Start: 2022-05-19

## 2022-05-18 RX ORDER — SODIUM CHLORIDE 0.9 % (FLUSH) 0.9 %
5-40 SYRINGE (ML) INJECTION PRN
Status: CANCELLED | OUTPATIENT
Start: 2022-05-19

## 2022-05-18 RX ORDER — ALBUTEROL SULFATE 90 UG/1
2 AEROSOL, METERED RESPIRATORY (INHALATION) 4 TIMES DAILY PRN
Qty: 54 G | Refills: 11 | Status: SHIPPED | OUTPATIENT
Start: 2022-05-18

## 2022-05-18 RX ORDER — ONDANSETRON 2 MG/ML
8 INJECTION INTRAMUSCULAR; INTRAVENOUS
Status: CANCELLED | OUTPATIENT
Start: 2022-05-19

## 2022-05-18 RX ORDER — ALBUTEROL SULFATE 90 UG/1
4 AEROSOL, METERED RESPIRATORY (INHALATION) PRN
Status: CANCELLED | OUTPATIENT
Start: 2022-05-19

## 2022-05-18 RX ORDER — ACETAMINOPHEN 325 MG/1
650 TABLET ORAL
Status: CANCELLED | OUTPATIENT
Start: 2022-05-19

## 2022-05-18 RX ORDER — MEPERIDINE HYDROCHLORIDE 50 MG/ML
12.5 INJECTION INTRAMUSCULAR; INTRAVENOUS; SUBCUTANEOUS PRN
Status: CANCELLED | OUTPATIENT
Start: 2022-05-19

## 2022-05-18 RX ORDER — FAMOTIDINE 10 MG/ML
20 INJECTION, SOLUTION INTRAVENOUS
Status: CANCELLED | OUTPATIENT
Start: 2022-05-19

## 2022-05-18 ASSESSMENT — ENCOUNTER SYMPTOMS
EYES NEGATIVE: 1
GASTROINTESTINAL NEGATIVE: 1
ALLERGIC/IMMUNOLOGIC NEGATIVE: 1

## 2022-05-18 NOTE — PATIENT INSTRUCTIONS
SURVEY:    You may be receiving a survey from [employer] regarding your visit today. Please complete the survey to enable us to provide the highest quality of care to you and your family. If you cannot score us a very good on any question, please call the office to discuss how we could have made your experience a very good one. Thank you.
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
## 2022-05-19 LAB
FERRITIN: 382 NG/ML (ref 13–150)
IRON SATURATION: 20 % (ref 20–55)
IRON: 53 UG/DL (ref 37–145)
TOTAL IRON BINDING CAPACITY: 266 UG/DL (ref 250–450)
UNSATURATED IRON BINDING CAPACITY: 213 UG/DL (ref 112–347)

## 2022-05-24 ENCOUNTER — HOSPITAL ENCOUNTER (OUTPATIENT)
Dept: INFUSION THERAPY | Age: 52
Discharge: HOME | End: 2022-05-24
Payer: MEDICAID

## 2022-05-24 VITALS
DIASTOLIC BLOOD PRESSURE: 79 MMHG | HEART RATE: 79 BPM | SYSTOLIC BLOOD PRESSURE: 179 MMHG | TEMPERATURE: 97 F | RESPIRATION RATE: 20 BRPM

## 2022-05-24 DIAGNOSIS — K90.9 IRON MALABSORPTION: Primary | ICD-10-CM

## 2022-05-24 DIAGNOSIS — D50.9 IRON DEFICIENCY ANEMIA, UNSPECIFIED IRON DEFICIENCY ANEMIA TYPE: ICD-10-CM

## 2022-05-24 PROCEDURE — 2580000003 HC RX 258: Performed by: INTERNAL MEDICINE

## 2022-05-24 PROCEDURE — 6360000002 HC RX W HCPCS: Performed by: INTERNAL MEDICINE

## 2022-05-24 PROCEDURE — 96366 THER/PROPH/DIAG IV INF ADDON: CPT

## 2022-05-24 PROCEDURE — 96365 THER/PROPH/DIAG IV INF INIT: CPT

## 2022-05-24 RX ORDER — SODIUM CHLORIDE 9 MG/ML
INJECTION, SOLUTION INTRAVENOUS CONTINUOUS
Status: CANCELLED | OUTPATIENT
Start: 2022-05-31

## 2022-05-24 RX ORDER — DIPHENHYDRAMINE HYDROCHLORIDE 50 MG/ML
50 INJECTION INTRAMUSCULAR; INTRAVENOUS
Status: CANCELLED | OUTPATIENT
Start: 2022-05-31

## 2022-05-24 RX ORDER — ONDANSETRON 2 MG/ML
8 INJECTION INTRAMUSCULAR; INTRAVENOUS
Status: CANCELLED | OUTPATIENT
Start: 2022-05-31

## 2022-05-24 RX ORDER — EPINEPHRINE 1 MG/ML
0.3 INJECTION, SOLUTION, CONCENTRATE INTRAVENOUS PRN
Status: CANCELLED | OUTPATIENT
Start: 2022-05-31

## 2022-05-24 RX ORDER — SODIUM CHLORIDE 0.9 % (FLUSH) 0.9 %
5-40 SYRINGE (ML) INJECTION PRN
Status: DISCONTINUED | OUTPATIENT
Start: 2022-05-24 | End: 2022-05-25 | Stop reason: HOSPADM

## 2022-05-24 RX ORDER — ACETAMINOPHEN 325 MG/1
650 TABLET ORAL
Status: CANCELLED | OUTPATIENT
Start: 2022-05-31

## 2022-05-24 RX ORDER — SODIUM CHLORIDE 9 MG/ML
5-250 INJECTION, SOLUTION INTRAVENOUS PRN
Status: CANCELLED | OUTPATIENT
Start: 2022-05-31

## 2022-05-24 RX ORDER — SODIUM CHLORIDE 9 MG/ML
INJECTION, SOLUTION INTRAVENOUS CONTINUOUS
Status: DISCONTINUED | OUTPATIENT
Start: 2022-05-24 | End: 2022-05-25 | Stop reason: HOSPADM

## 2022-05-24 RX ORDER — SODIUM CHLORIDE 0.9 % (FLUSH) 0.9 %
5-40 SYRINGE (ML) INJECTION PRN
Status: CANCELLED | OUTPATIENT
Start: 2022-05-31

## 2022-05-24 RX ORDER — MEPERIDINE HYDROCHLORIDE 25 MG/ML
12.5 INJECTION INTRAMUSCULAR; INTRAVENOUS; SUBCUTANEOUS PRN
Status: CANCELLED | OUTPATIENT
Start: 2022-05-31

## 2022-05-24 RX ORDER — SPIRONOLACTONE 25 MG/1
50 TABLET ORAL DAILY
COMMUNITY

## 2022-05-24 RX ORDER — ALBUTEROL SULFATE 90 UG/1
4 AEROSOL, METERED RESPIRATORY (INHALATION) PRN
Status: CANCELLED | OUTPATIENT
Start: 2022-05-31

## 2022-05-24 RX ORDER — HEPARIN SODIUM (PORCINE) LOCK FLUSH IV SOLN 100 UNIT/ML 100 UNIT/ML
500 SOLUTION INTRAVENOUS PRN
Status: CANCELLED | OUTPATIENT
Start: 2022-05-31

## 2022-05-24 RX ORDER — FAMOTIDINE 10 MG/ML
20 INJECTION, SOLUTION INTRAVENOUS
Status: CANCELLED | OUTPATIENT
Start: 2022-05-31

## 2022-05-24 RX ADMIN — SODIUM CHLORIDE: 9 INJECTION, SOLUTION INTRAVENOUS at 11:50

## 2022-05-24 RX ADMIN — IRON SUCROSE 300 MG: 20 INJECTION, SOLUTION INTRAVENOUS at 12:00

## 2022-05-24 NOTE — PLAN OF CARE
Problem: KNOWLEDGE DEFICIT  Goal: Patient/S.O. demonstrates understanding of disease process, treatment plan, medications, and discharge instructions.   Outcome: Adequate for Discharge

## 2022-05-31 ENCOUNTER — HOSPITAL ENCOUNTER (OUTPATIENT)
Dept: INFUSION THERAPY | Age: 52
Discharge: HOME | End: 2022-05-31
Payer: MEDICAID

## 2022-05-31 VITALS
RESPIRATION RATE: 18 BRPM | DIASTOLIC BLOOD PRESSURE: 79 MMHG | TEMPERATURE: 97.6 F | SYSTOLIC BLOOD PRESSURE: 165 MMHG | HEART RATE: 80 BPM

## 2022-05-31 DIAGNOSIS — D50.9 IRON DEFICIENCY ANEMIA, UNSPECIFIED IRON DEFICIENCY ANEMIA TYPE: ICD-10-CM

## 2022-05-31 DIAGNOSIS — K90.9 IRON MALABSORPTION: Primary | ICD-10-CM

## 2022-05-31 PROCEDURE — 96365 THER/PROPH/DIAG IV INF INIT: CPT

## 2022-05-31 PROCEDURE — 2580000003 HC RX 258: Performed by: INTERNAL MEDICINE

## 2022-05-31 PROCEDURE — 96366 THER/PROPH/DIAG IV INF ADDON: CPT

## 2022-05-31 PROCEDURE — 6360000002 HC RX W HCPCS: Performed by: INTERNAL MEDICINE

## 2022-05-31 RX ORDER — EPINEPHRINE 1 MG/ML
0.3 INJECTION, SOLUTION, CONCENTRATE INTRAVENOUS PRN
Status: CANCELLED | OUTPATIENT
Start: 2022-06-07

## 2022-05-31 RX ORDER — ACETAMINOPHEN 325 MG/1
650 TABLET ORAL
Status: CANCELLED | OUTPATIENT
Start: 2022-06-07

## 2022-05-31 RX ORDER — MEPERIDINE HYDROCHLORIDE 25 MG/ML
12.5 INJECTION INTRAMUSCULAR; INTRAVENOUS; SUBCUTANEOUS PRN
Status: CANCELLED | OUTPATIENT
Start: 2022-06-07

## 2022-05-31 RX ORDER — SODIUM CHLORIDE 9 MG/ML
INJECTION, SOLUTION INTRAVENOUS CONTINUOUS
Status: CANCELLED | OUTPATIENT
Start: 2022-06-07

## 2022-05-31 RX ORDER — HEPARIN SODIUM (PORCINE) LOCK FLUSH IV SOLN 100 UNIT/ML 100 UNIT/ML
500 SOLUTION INTRAVENOUS PRN
Status: CANCELLED | OUTPATIENT
Start: 2022-06-07

## 2022-05-31 RX ORDER — SODIUM CHLORIDE 0.9 % (FLUSH) 0.9 %
5-40 SYRINGE (ML) INJECTION PRN
Status: DISCONTINUED | OUTPATIENT
Start: 2022-05-31 | End: 2022-06-01 | Stop reason: HOSPADM

## 2022-05-31 RX ORDER — ONDANSETRON 2 MG/ML
8 INJECTION INTRAMUSCULAR; INTRAVENOUS
Status: CANCELLED | OUTPATIENT
Start: 2022-06-07

## 2022-05-31 RX ORDER — SODIUM CHLORIDE 9 MG/ML
INJECTION, SOLUTION INTRAVENOUS CONTINUOUS
Status: ACTIVE | OUTPATIENT
Start: 2022-05-31 | End: 2022-05-31

## 2022-05-31 RX ORDER — IRBESARTAN 300 MG/1
300 TABLET ORAL NIGHTLY
COMMUNITY

## 2022-05-31 RX ORDER — SODIUM CHLORIDE 9 MG/ML
5-250 INJECTION, SOLUTION INTRAVENOUS PRN
Status: CANCELLED | OUTPATIENT
Start: 2022-06-07

## 2022-05-31 RX ORDER — ALBUTEROL SULFATE 90 UG/1
4 AEROSOL, METERED RESPIRATORY (INHALATION) PRN
Status: CANCELLED | OUTPATIENT
Start: 2022-06-07

## 2022-05-31 RX ORDER — SODIUM CHLORIDE 0.9 % (FLUSH) 0.9 %
5-40 SYRINGE (ML) INJECTION PRN
Status: CANCELLED | OUTPATIENT
Start: 2022-06-07

## 2022-05-31 RX ORDER — FAMOTIDINE 10 MG/ML
20 INJECTION, SOLUTION INTRAVENOUS
Status: CANCELLED | OUTPATIENT
Start: 2022-06-07

## 2022-05-31 RX ORDER — DIPHENHYDRAMINE HYDROCHLORIDE 50 MG/ML
50 INJECTION INTRAMUSCULAR; INTRAVENOUS
Status: CANCELLED | OUTPATIENT
Start: 2022-06-07

## 2022-05-31 RX ADMIN — IRON SUCROSE 300 MG: 20 INJECTION, SOLUTION INTRAVENOUS at 11:43

## 2022-05-31 RX ADMIN — SODIUM CHLORIDE: 9 INJECTION, SOLUTION INTRAVENOUS at 11:31

## 2022-05-31 ASSESSMENT — PAIN DESCRIPTION - ORIENTATION: ORIENTATION: RIGHT;LEFT

## 2022-05-31 ASSESSMENT — PAIN SCALES - GENERAL: PAINLEVEL_OUTOF10: 5

## 2022-05-31 ASSESSMENT — PAIN DESCRIPTION - LOCATION: LOCATION: KNEE;HIP

## 2022-05-31 NOTE — PLAN OF CARE
Problem: Pain  Goal: Verbalizes/displays adequate comfort level or baseline comfort level  Outcome: Adequate for Discharge     Problem: KNOWLEDGE DEFICIT  Goal: Patient/S.O. demonstrates understanding of disease process, treatment plan, medications, and discharge instructions.   Outcome: Adequate for Discharge

## 2022-06-08 ENCOUNTER — HOSPITAL ENCOUNTER (OUTPATIENT)
Dept: INFUSION THERAPY | Age: 52
Discharge: HOME | End: 2022-06-08
Payer: MEDICAID

## 2022-06-08 VITALS
HEART RATE: 71 BPM | DIASTOLIC BLOOD PRESSURE: 64 MMHG | TEMPERATURE: 98 F | RESPIRATION RATE: 18 BRPM | SYSTOLIC BLOOD PRESSURE: 146 MMHG

## 2022-06-08 DIAGNOSIS — K90.9 IRON MALABSORPTION: Primary | ICD-10-CM

## 2022-06-08 DIAGNOSIS — D50.9 IRON DEFICIENCY ANEMIA, UNSPECIFIED IRON DEFICIENCY ANEMIA TYPE: ICD-10-CM

## 2022-06-08 PROCEDURE — 2580000003 HC RX 258: Performed by: INTERNAL MEDICINE

## 2022-06-08 PROCEDURE — 96365 THER/PROPH/DIAG IV INF INIT: CPT

## 2022-06-08 PROCEDURE — 6360000002 HC RX W HCPCS: Performed by: INTERNAL MEDICINE

## 2022-06-08 RX ORDER — SODIUM CHLORIDE 9 MG/ML
INJECTION, SOLUTION INTRAVENOUS CONTINUOUS
Status: ACTIVE | OUTPATIENT
Start: 2022-06-08 | End: 2022-06-08

## 2022-06-08 RX ORDER — EPINEPHRINE 1 MG/ML
0.3 INJECTION, SOLUTION, CONCENTRATE INTRAVENOUS PRN
Status: CANCELLED | OUTPATIENT
Start: 2022-06-14

## 2022-06-08 RX ORDER — ACETAMINOPHEN 325 MG/1
650 TABLET ORAL
Status: CANCELLED | OUTPATIENT
Start: 2022-06-14

## 2022-06-08 RX ORDER — ONDANSETRON 2 MG/ML
8 INJECTION INTRAMUSCULAR; INTRAVENOUS
Status: CANCELLED | OUTPATIENT
Start: 2022-06-14

## 2022-06-08 RX ORDER — SODIUM CHLORIDE 0.9 % (FLUSH) 0.9 %
5-40 SYRINGE (ML) INJECTION PRN
Status: CANCELLED | OUTPATIENT
Start: 2022-06-14

## 2022-06-08 RX ORDER — MEPERIDINE HYDROCHLORIDE 25 MG/ML
12.5 INJECTION INTRAMUSCULAR; INTRAVENOUS; SUBCUTANEOUS PRN
Status: CANCELLED | OUTPATIENT
Start: 2022-06-14

## 2022-06-08 RX ORDER — FAMOTIDINE 10 MG/ML
20 INJECTION, SOLUTION INTRAVENOUS
Status: CANCELLED | OUTPATIENT
Start: 2022-06-14

## 2022-06-08 RX ORDER — SODIUM CHLORIDE 0.9 % (FLUSH) 0.9 %
5-40 SYRINGE (ML) INJECTION PRN
Status: DISCONTINUED | OUTPATIENT
Start: 2022-06-08 | End: 2022-06-09 | Stop reason: HOSPADM

## 2022-06-08 RX ORDER — SODIUM CHLORIDE 9 MG/ML
5-250 INJECTION, SOLUTION INTRAVENOUS PRN
Status: CANCELLED | OUTPATIENT
Start: 2022-06-14

## 2022-06-08 RX ORDER — DIPHENHYDRAMINE HYDROCHLORIDE 50 MG/ML
50 INJECTION INTRAMUSCULAR; INTRAVENOUS
Status: CANCELLED | OUTPATIENT
Start: 2022-06-14

## 2022-06-08 RX ORDER — SODIUM CHLORIDE 9 MG/ML
INJECTION, SOLUTION INTRAVENOUS CONTINUOUS
Status: CANCELLED | OUTPATIENT
Start: 2022-06-14

## 2022-06-08 RX ORDER — HEPARIN SODIUM (PORCINE) LOCK FLUSH IV SOLN 100 UNIT/ML 100 UNIT/ML
500 SOLUTION INTRAVENOUS PRN
Status: CANCELLED | OUTPATIENT
Start: 2022-06-14

## 2022-06-08 RX ORDER — ALBUTEROL SULFATE 90 UG/1
4 AEROSOL, METERED RESPIRATORY (INHALATION) PRN
Status: CANCELLED | OUTPATIENT
Start: 2022-06-14

## 2022-06-08 RX ADMIN — IRON SUCROSE 300 MG: 20 INJECTION, SOLUTION INTRAVENOUS at 10:32

## 2022-06-08 RX ADMIN — SODIUM CHLORIDE: 9 INJECTION, SOLUTION INTRAVENOUS at 10:22

## 2022-06-08 RX ADMIN — SODIUM CHLORIDE, PRESERVATIVE FREE 10 ML: 5 INJECTION INTRAVENOUS at 10:15

## 2022-06-15 ENCOUNTER — HOSPITAL ENCOUNTER (OUTPATIENT)
Dept: INFUSION THERAPY | Age: 52
Discharge: HOME | End: 2022-06-15
Payer: MEDICAID

## 2022-06-15 ENCOUNTER — OFFICE VISIT (OUTPATIENT)
Dept: UROLOGY | Age: 52
End: 2022-06-15
Payer: MEDICAID

## 2022-06-15 VITALS
SYSTOLIC BLOOD PRESSURE: 128 MMHG | RESPIRATION RATE: 18 BRPM | TEMPERATURE: 97.4 F | HEART RATE: 68 BPM | DIASTOLIC BLOOD PRESSURE: 68 MMHG

## 2022-06-15 VITALS
SYSTOLIC BLOOD PRESSURE: 162 MMHG | TEMPERATURE: 96.8 F | HEART RATE: 81 BPM | DIASTOLIC BLOOD PRESSURE: 99 MMHG | BODY MASS INDEX: 47.09 KG/M2 | HEIGHT: 66 IN | WEIGHT: 293 LBS

## 2022-06-15 DIAGNOSIS — N39.46 MIXED INCONTINENCE: Primary | ICD-10-CM

## 2022-06-15 DIAGNOSIS — K90.9 IRON MALABSORPTION: Primary | ICD-10-CM

## 2022-06-15 DIAGNOSIS — D50.9 IRON DEFICIENCY ANEMIA, UNSPECIFIED IRON DEFICIENCY ANEMIA TYPE: ICD-10-CM

## 2022-06-15 PROCEDURE — 1036F TOBACCO NON-USER: CPT | Performed by: PHYSICIAN ASSISTANT

## 2022-06-15 PROCEDURE — 96365 THER/PROPH/DIAG IV INF INIT: CPT

## 2022-06-15 PROCEDURE — G8427 DOCREV CUR MEDS BY ELIG CLIN: HCPCS | Performed by: PHYSICIAN ASSISTANT

## 2022-06-15 PROCEDURE — 3017F COLORECTAL CA SCREEN DOC REV: CPT | Performed by: PHYSICIAN ASSISTANT

## 2022-06-15 PROCEDURE — 6360000002 HC RX W HCPCS: Performed by: INTERNAL MEDICINE

## 2022-06-15 PROCEDURE — 2580000003 HC RX 258: Performed by: INTERNAL MEDICINE

## 2022-06-15 PROCEDURE — 99204 OFFICE O/P NEW MOD 45 MIN: CPT | Performed by: PHYSICIAN ASSISTANT

## 2022-06-15 PROCEDURE — 51798 US URINE CAPACITY MEASURE: CPT | Performed by: PHYSICIAN ASSISTANT

## 2022-06-15 PROCEDURE — 96366 THER/PROPH/DIAG IV INF ADDON: CPT

## 2022-06-15 PROCEDURE — G8417 CALC BMI ABV UP PARAM F/U: HCPCS | Performed by: PHYSICIAN ASSISTANT

## 2022-06-15 RX ORDER — SODIUM CHLORIDE 0.9 % (FLUSH) 0.9 %
5-40 SYRINGE (ML) INJECTION PRN
OUTPATIENT
Start: 2022-06-21

## 2022-06-15 RX ORDER — SODIUM CHLORIDE 9 MG/ML
INJECTION, SOLUTION INTRAVENOUS CONTINUOUS
OUTPATIENT
Start: 2022-06-21

## 2022-06-15 RX ORDER — ALBUTEROL SULFATE 90 UG/1
4 AEROSOL, METERED RESPIRATORY (INHALATION) PRN
OUTPATIENT
Start: 2022-06-21

## 2022-06-15 RX ORDER — ACETAMINOPHEN 325 MG/1
650 TABLET ORAL
OUTPATIENT
Start: 2022-06-21

## 2022-06-15 RX ORDER — MEPERIDINE HYDROCHLORIDE 25 MG/ML
12.5 INJECTION INTRAMUSCULAR; INTRAVENOUS; SUBCUTANEOUS PRN
OUTPATIENT
Start: 2022-06-21

## 2022-06-15 RX ORDER — SODIUM CHLORIDE 9 MG/ML
INJECTION, SOLUTION INTRAVENOUS CONTINUOUS
Status: ACTIVE | OUTPATIENT
Start: 2022-06-15 | End: 2022-06-15

## 2022-06-15 RX ORDER — ONDANSETRON 2 MG/ML
8 INJECTION INTRAMUSCULAR; INTRAVENOUS
OUTPATIENT
Start: 2022-06-21

## 2022-06-15 RX ORDER — FAMOTIDINE 10 MG/ML
20 INJECTION, SOLUTION INTRAVENOUS
OUTPATIENT
Start: 2022-06-21

## 2022-06-15 RX ORDER — SODIUM CHLORIDE 0.9 % (FLUSH) 0.9 %
5-40 SYRINGE (ML) INJECTION PRN
Status: DISCONTINUED | OUTPATIENT
Start: 2022-06-15 | End: 2022-06-16 | Stop reason: HOSPADM

## 2022-06-15 RX ORDER — OXYBUTYNIN CHLORIDE 10 MG/1
10 TABLET, EXTENDED RELEASE ORAL DAILY
Qty: 30 TABLET | Refills: 3 | Status: SHIPPED | OUTPATIENT
Start: 2022-06-15 | End: 2022-07-29 | Stop reason: SINTOL

## 2022-06-15 RX ORDER — EPINEPHRINE 1 MG/ML
0.3 INJECTION, SOLUTION, CONCENTRATE INTRAVENOUS PRN
OUTPATIENT
Start: 2022-06-21

## 2022-06-15 RX ORDER — HEPARIN SODIUM (PORCINE) LOCK FLUSH IV SOLN 100 UNIT/ML 100 UNIT/ML
500 SOLUTION INTRAVENOUS PRN
OUTPATIENT
Start: 2022-06-21

## 2022-06-15 RX ORDER — SODIUM CHLORIDE 9 MG/ML
5-250 INJECTION, SOLUTION INTRAVENOUS PRN
OUTPATIENT
Start: 2022-06-21

## 2022-06-15 RX ORDER — DIPHENHYDRAMINE HYDROCHLORIDE 50 MG/ML
50 INJECTION INTRAMUSCULAR; INTRAVENOUS
OUTPATIENT
Start: 2022-06-21

## 2022-06-15 RX ADMIN — IRON SUCROSE 300 MG: 20 INJECTION, SOLUTION INTRAVENOUS at 09:23

## 2022-06-15 RX ADMIN — SODIUM CHLORIDE: 9 INJECTION, SOLUTION INTRAVENOUS at 09:17

## 2022-06-15 ASSESSMENT — ENCOUNTER SYMPTOMS
CONSTIPATION: 0
VOMITING: 0
COLOR CHANGE: 0
NAUSEA: 0
BACK PAIN: 0
ABDOMINAL PAIN: 0
SHORTNESS OF BREATH: 0
APNEA: 0
EYE REDNESS: 0
WHEEZING: 0
COUGH: 0
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
## 2022-06-15 NOTE — PLAN OF CARE
Problem: KNOWLEDGE DEFICIT  Goal: Patient/S.O. demonstrates understanding of disease process, treatment plan, medications, and discharge instructions.   Outcome: Adequate for Discharge     Problem: Pain  Goal: Verbalizes/displays adequate comfort level or baseline comfort level  Outcome: Adequate for Discharge

## 2022-06-15 NOTE — PATIENT INSTRUCTIONS
BLADDER IRRITANTS     There are several changes you can make to your diet to help improve your urinary symptoms. The following have been shown to cause irritation to the bladder and should be AVOIDED if possible:  ~ Coffee (including decaffeinated)   ~ ALL Tea (including green teas and decaffeinated)  ~ Soda/Pop/carbonated beverages/Energy drinks (especially dark dyed geoffrey, root beers, mountain dew, etc)  ~These are MUCH worse if they have caffeine, but can also be irritative to the bladder even without caffeine  ~ Alcoholic beverages  ~ Spicy foods (peppers contain capsaicin, which is very irritating to the bladder)  ~ Acidic foods (for example: tomato based foods, orange juice, etc)    We encourage increased water intake, unless you have been placed on a fluid restriction by another provider.

## 2022-06-17 ENCOUNTER — HOSPITAL ENCOUNTER (OUTPATIENT)
Age: 52
Discharge: HOME | End: 2022-06-17
Payer: MEDICAID

## 2022-06-17 DIAGNOSIS — N39.46 MIXED INCONTINENCE: ICD-10-CM

## 2022-06-17 LAB
-: ABNORMAL
BACTERIA: ABNORMAL
BILIRUBIN URINE: NEGATIVE
COLOR: YELLOW
EPITHELIAL CELLS UA: ABNORMAL /HPF (ref 0–25)
GLUCOSE URINE: NEGATIVE
KETONES, URINE: NEGATIVE
LEUKOCYTE ESTERASE, URINE: NEGATIVE
NITRITE, URINE: NEGATIVE
PH UA: 6 (ref 5–9)
PROTEIN UA: ABNORMAL
RBC UA: ABNORMAL /HPF (ref 0–2)
RENAL EPITHELIAL, UA: ABNORMAL /HPF
SPECIFIC GRAVITY UA: 1.02 (ref 1.01–1.02)
TURBIDITY: CLEAR
URINE HGB: ABNORMAL
UROBILINOGEN, URINE: NORMAL
WBC UA: ABNORMAL /HPF (ref 0–5)
YEAST: ABNORMAL

## 2022-06-17 PROCEDURE — 81001 URINALYSIS AUTO W/SCOPE: CPT

## 2022-06-17 PROCEDURE — 87086 URINE CULTURE/COLONY COUNT: CPT

## 2022-06-18 LAB
CULTURE: NORMAL
SPECIMEN DESCRIPTION: NORMAL

## 2022-06-20 ENCOUNTER — TELEPHONE (OUTPATIENT)
Dept: UROLOGY | Age: 52
End: 2022-06-20

## 2022-06-20 NOTE — TELEPHONE ENCOUNTER
----- [facility]  8:34 AM EDT -----  Please call pt - urine culture reviewed and does not show UTI

## 2022-07-29 ENCOUNTER — OFFICE VISIT (OUTPATIENT)
Dept: UROLOGY | Age: 52
End: 2022-07-29
Payer: MEDICAID

## 2022-07-29 VITALS
HEIGHT: 66 IN | SYSTOLIC BLOOD PRESSURE: 152 MMHG | BODY MASS INDEX: 47.09 KG/M2 | HEART RATE: 79 BPM | TEMPERATURE: 97.3 F | DIASTOLIC BLOOD PRESSURE: 95 MMHG | WEIGHT: 293 LBS

## 2022-07-29 DIAGNOSIS — N39.46 MIXED INCONTINENCE: Primary | ICD-10-CM

## 2022-07-29 DIAGNOSIS — R35.1 NOCTURIA: ICD-10-CM

## 2022-07-29 PROCEDURE — 51798 US URINE CAPACITY MEASURE: CPT | Performed by: NURSE PRACTITIONER

## 2022-07-29 PROCEDURE — G8417 CALC BMI ABV UP PARAM F/U: HCPCS | Performed by: NURSE PRACTITIONER

## 2022-07-29 PROCEDURE — G8427 DOCREV CUR MEDS BY ELIG CLIN: HCPCS | Performed by: NURSE PRACTITIONER

## 2022-07-29 PROCEDURE — 3017F COLORECTAL CA SCREEN DOC REV: CPT | Performed by: NURSE PRACTITIONER

## 2022-07-29 PROCEDURE — 1036F TOBACCO NON-USER: CPT | Performed by: NURSE PRACTITIONER

## 2022-07-29 PROCEDURE — 99214 OFFICE O/P EST MOD 30 MIN: CPT | Performed by: NURSE PRACTITIONER

## 2022-07-29 RX ORDER — TROSPIUM CHLORIDE 20 MG/1
20 TABLET, FILM COATED ORAL 2 TIMES DAILY
Qty: 60 TABLET | Refills: 3 | Status: SHIPPED | OUTPATIENT
Start: 2022-07-29 | End: 2022-09-08

## 2022-07-29 NOTE — PATIENT INSTRUCTIONS
SURVEY:    You may be receiving a survey from [employer] regarding your visit today. Please complete the survey to enable us to provide the highest quality of care to you and your family. If you cannot score us a very good on any question, please call the office to discuss how we could have made your experience a very good one. Thank you.

## 2022-07-29 NOTE — PROGRESS NOTES
WISDOM TOOTH EXTRACTION  02/2015    2 BOTTOM WISDOM TEETH REMOVED 2 TOP MOLARS REMOVED     Outpatient Encounter Medications as of 7/29/2022   Medication Sig Dispense Refill    trospium (SANCTURA) 20 MG tablet Take 1 tablet by mouth in the morning and 1 tablet before bedtime.  60 tablet 3    irbesartan (AVAPRO) 300 MG tablet Take 300 mg by mouth nightly      spironolactone (ALDACTONE) 25 MG tablet Take 50 mg by mouth daily       amLODIPine (NORVASC) 10 MG tablet take 1 tablet by mouth once daily      albuterol sulfate HFA (VENTOLIN HFA) 108 (90 Base) MCG/ACT inhaler Inhale 2 puffs into the lungs 4 times daily as needed for Wheezing 54 g 11    montelukast (SINGULAIR) 10 MG tablet Take 1 tablet by mouth nightly 30 tablet 11    mometasone-formoterol (DULERA) 200-5 MCG/ACT inhaler Inhale 2 puffs into the lungs 2 times daily 1 each 11    atorvastatin (LIPITOR) 20 MG tablet Take 1 tablet by mouth daily 30 tablet 1    tiZANidine (ZANAFLEX) 4 MG tablet Take 4 mg by mouth as needed  0    ranitidine (ZANTAC) 300 MG tablet Take 1 tablet by mouth nightly 30 tablet 3    metoclopramide (REGLAN) 5 MG tablet Take 1 tablet by mouth 3 times daily 120 tablet 3    pantoprazole (PROTONIX) 40 MG tablet take 1 tablet by mouth once daily 30 tablet 3    fluticasone (FLONASE) 50 MCG/ACT nasal spray 2 sprays by Nasal route daily 16 g 11    docusate sodium (COLACE) 100 MG capsule Take 100 mg by mouth 2 times daily      potassium chloride (KLOR-CON) 10 MEQ extended release tablet Take 10 mEq by mouth daily  1    nabumetone (RELAFEN) 500 MG tablet Take 500 mg by mouth 2 times daily      Cholecalciferol (VITAMIN D PO) Take 1 tablet by mouth every morning       vitamin B-1 (THIAMINE) 100 MG tablet Take 100 mg by mouth daily       ferrous sulfate 325 (65 FE) MG tablet Take 325 mg by mouth daily (with breakfast)      Multiple Vitamin (MVI, BARIATRIC ADVANTAGE MULTI-FORMULA, CHEW TAB) Take 1 tablet by mouth 2 times daily BA caps with iron -2 caps daily Calcium Citrate-Vitamin D (CALCIUM CITRATE + D PO) Take 500 mg by mouth 3 times daily      albuterol (PROVENTIL) (2.5 MG/3ML) 0.083% nebulizer solution INHALE CONTENTS OF 1 VIAL BY MOUTH VIA NEBULIZER EVERY 6 HOURS AS NEEDED 360 mL 11    silver sulfADIAZINE (SILVADENE) 1 % cream Apply  topically 2 times daily as needed. Apply topically daily. 100 g 1    [DISCONTINUED] oxybutynin (DITROPAN XL) 10 MG extended release tablet Take 1 tablet by mouth daily 30 tablet 3    ascorbic acid (VITAMIN C) 500 MG tablet Take 1 tablet by mouth 2 times daily for 7 days 14 tablet 0    EPINEPHrine (EPIPEN) 0.3 MG/0.3ML SOAJ injection Inject 0.3 mg into the muscle as needed Use as directed for allergic reaction (Patient not taking: Reported on 7/29/2022)      gabapentin (NEURONTIN) 100 MG capsule take 3 capsules by mouth at bedtime. (Patient taking differently: Take 100 mg by mouth daily. Takes 1 at bedtime) 90 capsule 2    Dietary Management Product (VASCULERA) TABS Take 630 mg by mouth daily (Patient not taking: No sig reported)       No facility-administered encounter medications on file as of 7/29/2022.       Current Outpatient Medications on File Prior to Visit   Medication Sig Dispense Refill    irbesartan (AVAPRO) 300 MG tablet Take 300 mg by mouth nightly      spironolactone (ALDACTONE) 25 MG tablet Take 50 mg by mouth daily       amLODIPine (NORVASC) 10 MG tablet take 1 tablet by mouth once daily      albuterol sulfate HFA (VENTOLIN HFA) 108 (90 Base) MCG/ACT inhaler Inhale 2 puffs into the lungs 4 times daily as needed for Wheezing 54 g 11    montelukast (SINGULAIR) 10 MG tablet Take 1 tablet by mouth nightly 30 tablet 11    mometasone-formoterol (DULERA) 200-5 MCG/ACT inhaler Inhale 2 puffs into the lungs 2 times daily 1 each 11    atorvastatin (LIPITOR) 20 MG tablet Take 1 tablet by mouth daily 30 tablet 1    tiZANidine (ZANAFLEX) 4 MG tablet Take 4 mg by mouth as needed  0    ranitidine (ZANTAC) 300 MG tablet Take 1 History   Problem Relation Age of Onset    Cancer Mother         uterine    Heart Disease Mother     High Blood Pressure Mother     Stroke Mother     Arthritis Mother     Depression Mother     Stroke Father     High Blood Pressure Father     Diabetes Father     High Cholesterol Father     Sickle Cell Anemia Maternal Aunt         aunt has the trait    Kidney Disease Sister         KIDNEY FAILURE    Kidney Disease Sister         SIDS-2 MONTHS OLD     Social History     Tobacco Use   Smoking Status Never   Smokeless Tobacco Never       Social History     Substance and Sexual Activity   Alcohol Use No       Review of Systems   Constitutional:  Negative for appetite change, chills and fever. Eyes:  Negative for redness and visual disturbance. Respiratory:  Negative for apnea, cough, shortness of breath and wheezing. Cardiovascular:  Negative for chest pain and leg swelling. Gastrointestinal:  Negative for abdominal pain, constipation, nausea and vomiting. Genitourinary:  Positive for frequency. Negative for difficulty urinating, dyspareunia, dysuria, enuresis, flank pain, hematuria, pelvic pain, urgency, vaginal bleeding and vaginal discharge. Musculoskeletal:  Negative for back pain, joint swelling and myalgias. Skin:  Negative for color change, rash and wound. Neurological:  Negative for dizziness, tremors and numbness. Hematological:  Negative for adenopathy. Does not bruise/bleed easily. Psychiatric/Behavioral:  Negative for sleep disturbance. BP (!) 152/95 (Site: Right Lower Arm, Position: Sitting, Cuff Size: Large Adult)   Pulse 79   Temp 97.3 °F (36.3 °C)   Ht 5' 6\" (1.676 m)   Wt (!) 400 lb (181.4 kg)   BMI 64.56 kg/m²       PHYSICAL EXAM:  Constitutional: Patient resting comfortably, in no acute distress. Neuro: Alert and oriented to person place and time. Cranial nerves grossly intact.     Psych: Mood and affect normal.  Skin: Warm, dry  HEENT: normocephalic, atraumatic  Lymphatics: No palpable lymphadenopathy  Lungs: Respiratory effort normal, unlabored  Cardiovascular:  Normal peripheral pulses  Abdomen: Soft, non-tender, non-distended with no organomegaly or palpable masses. : No CVA tenderness. Bladder non-tender and not distended. Pelvic: Deferred    Lab Results   Component Value Date    BUN 12 12/23/2021     Lab Results   Component Value Date    CREATININE 1.00 (H) 12/23/2021       ASSESSMENT:   Diagnosis Orders   1. Mixed incontinence  CT MEASUREMENT,POST-VOID RESIDUAL VOLUME BY US,NON-IMAGING      2.  Nocturia  CT MEASUREMENT,POST-VOID RESIDUAL VOLUME BY US,NON-IMAGING              PLAN:  Stop oxybutynin    Start trospium BID    F/U in 6 weeks or sooner if needed

## 2022-08-01 ASSESSMENT — ENCOUNTER SYMPTOMS
VOMITING: 0
EYE REDNESS: 0
APNEA: 0
COUGH: 0
CONSTIPATION: 0
SHORTNESS OF BREATH: 0
COLOR CHANGE: 0
ABDOMINAL PAIN: 0
NAUSEA: 0
WHEEZING: 0
BACK PAIN: 0

## 2022-08-02 ENCOUNTER — TELEPHONE (OUTPATIENT)
Dept: UROLOGY | Age: 52
End: 2022-08-02

## 2022-08-02 RX ORDER — SOLIFENACIN SUCCINATE 10 MG/1
10 TABLET, FILM COATED ORAL DAILY
Qty: 30 TABLET | Refills: 5 | Status: SHIPPED | OUTPATIENT
Start: 2022-08-02 | End: 2022-09-08

## 2022-08-02 NOTE — TELEPHONE ENCOUNTER
Let patient know please this and let her know that I sent in vesicare. .. Just so you know when I send in vesicare is says it require a PA. When I send trospium it is not asking for a PA.

## 2022-08-02 NOTE — TELEPHONE ENCOUNTER
I called [facility] and it went through with zero copay. Informed patient. She has still been taking the other med until she gort this the new omne. She asked if the vesicare had the same side effects of nausea and dry mouth.

## 2022-08-02 NOTE — TELEPHONE ENCOUNTER
Trospium prior Donata Comber was denied. She has to fail 30 days of one of the following: Flavoxate, Myrbetriq, Solifenacin, or Toviaz. Or we need to say she can't take any of these.

## 2022-08-02 NOTE — TELEPHONE ENCOUNTER
Stop the oxybutynin    The vesicare might has the same side effects, but because of her insurance I have to try this one

## 2022-08-11 ENCOUNTER — TELEPHONE (OUTPATIENT)
Dept: UROLOGY | Age: 52
End: 2022-08-11

## 2022-08-11 NOTE — TELEPHONE ENCOUNTER
She should be taking vesicare. Her insurance denied trospium    She just started this, if she can give it another week. It should get better.

## 2022-08-11 NOTE — TELEPHONE ENCOUNTER
Called patient and she is taking the Vesicare.   She stated that she will try it for another week to see if symptoms improve

## 2022-08-11 NOTE — TELEPHONE ENCOUNTER
Patient called stating she is having nausea and dizziness with Trospium. She stated she would rather take Oxybutynin.

## 2022-08-29 ENCOUNTER — HOSPITAL ENCOUNTER (OUTPATIENT)
Age: 52
Discharge: HOME | End: 2022-08-29
Payer: MEDICAID

## 2022-08-29 DIAGNOSIS — D64.9 ANEMIA, UNSPECIFIED TYPE: ICD-10-CM

## 2022-08-29 DIAGNOSIS — K90.9 IRON MALABSORPTION: ICD-10-CM

## 2022-08-29 LAB
ABSOLUTE EOS #: <0.03 K/UL (ref 0–0.44)
ABSOLUTE IMMATURE GRANULOCYTE: 0.03 K/UL (ref 0–0.3)
ABSOLUTE LYMPH #: 2.3 K/UL (ref 1.1–3.7)
ABSOLUTE MONO #: 0.71 K/UL (ref 0.1–1.2)
BASOPHILS # BLD: 1 % (ref 0–2)
BASOPHILS ABSOLUTE: 0.05 K/UL (ref 0–0.2)
EOSINOPHILS RELATIVE PERCENT: 0 % (ref 1–4)
FERRITIN: 479 NG/ML (ref 13–150)
HCT VFR BLD CALC: 32.8 % (ref 36.3–47.1)
HEMOGLOBIN: 10 G/DL (ref 11.9–15.1)
IMMATURE GRANULOCYTES: 0 %
IRON SATURATION: 20 % (ref 20–55)
IRON: 46 UG/DL (ref 37–145)
LYMPHOCYTES # BLD: 29 % (ref 24–43)
MCH RBC QN AUTO: 29.9 PG (ref 25.2–33.5)
MCHC RBC AUTO-ENTMCNC: 30.5 G/DL (ref 28.4–34.8)
MCV RBC AUTO: 97.9 FL (ref 82.6–102.9)
MONOCYTES # BLD: 9 % (ref 3–12)
NRBC AUTOMATED: 0 PER 100 WBC
PDW BLD-RTO: 13.5 % (ref 11.8–14.4)
PLATELET # BLD: 245 K/UL (ref 138–453)
PMV BLD AUTO: 11.5 FL (ref 8.1–13.5)
RBC # BLD: 3.35 M/UL (ref 3.95–5.11)
SEG NEUTROPHILS: 61 % (ref 36–65)
SEGMENTED NEUTROPHILS ABSOLUTE COUNT: 4.81 K/UL (ref 1.5–8.1)
TOTAL IRON BINDING CAPACITY: 228 UG/DL (ref 250–450)
UNSATURATED IRON BINDING CAPACITY: 182 UG/DL (ref 112–347)
WBC # BLD: 7.9 K/UL (ref 3.5–11.3)

## 2022-08-29 PROCEDURE — 85025 COMPLETE CBC W/AUTO DIFF WBC: CPT

## 2022-08-29 PROCEDURE — 36415 COLL VENOUS BLD VENIPUNCTURE: CPT

## 2022-08-29 PROCEDURE — 83540 ASSAY OF IRON: CPT

## 2022-08-29 PROCEDURE — 83550 IRON BINDING TEST: CPT

## 2022-08-29 PROCEDURE — 82728 ASSAY OF FERRITIN: CPT

## 2022-08-31 ENCOUNTER — OFFICE VISIT (OUTPATIENT)
Dept: ONCOLOGY | Age: 52
End: 2022-08-31
Payer: MEDICAID

## 2022-08-31 VITALS
TEMPERATURE: 96.7 F | SYSTOLIC BLOOD PRESSURE: 197 MMHG | BODY MASS INDEX: 65.69 KG/M2 | DIASTOLIC BLOOD PRESSURE: 93 MMHG | WEIGHT: 293 LBS | HEART RATE: 81 BPM | RESPIRATION RATE: 20 BRPM

## 2022-08-31 DIAGNOSIS — D50.9 IRON DEFICIENCY ANEMIA, UNSPECIFIED IRON DEFICIENCY ANEMIA TYPE: Primary | ICD-10-CM

## 2022-08-31 PROCEDURE — G8427 DOCREV CUR MEDS BY ELIG CLIN: HCPCS | Performed by: INTERNAL MEDICINE

## 2022-08-31 PROCEDURE — G8417 CALC BMI ABV UP PARAM F/U: HCPCS | Performed by: INTERNAL MEDICINE

## 2022-08-31 PROCEDURE — 1036F TOBACCO NON-USER: CPT | Performed by: INTERNAL MEDICINE

## 2022-08-31 PROCEDURE — 99214 OFFICE O/P EST MOD 30 MIN: CPT | Performed by: INTERNAL MEDICINE

## 2022-08-31 PROCEDURE — 3017F COLORECTAL CA SCREEN DOC REV: CPT | Performed by: INTERNAL MEDICINE
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
## 2022-09-08 ENCOUNTER — OFFICE VISIT (OUTPATIENT)
Dept: UROLOGY | Age: 52
End: 2022-09-08
Payer: MEDICAID

## 2022-09-08 VITALS
DIASTOLIC BLOOD PRESSURE: 104 MMHG | SYSTOLIC BLOOD PRESSURE: 160 MMHG | RESPIRATION RATE: 18 BRPM | BODY MASS INDEX: 47.09 KG/M2 | WEIGHT: 293 LBS | HEART RATE: 71 BPM | TEMPERATURE: 98 F | HEIGHT: 66 IN

## 2022-09-08 DIAGNOSIS — N39.46 MIXED INCONTINENCE: Primary | ICD-10-CM

## 2022-09-08 DIAGNOSIS — R35.1 NOCTURIA: ICD-10-CM

## 2022-09-08 PROCEDURE — 1036F TOBACCO NON-USER: CPT | Performed by: UROLOGY

## 2022-09-08 PROCEDURE — G8417 CALC BMI ABV UP PARAM F/U: HCPCS | Performed by: UROLOGY

## 2022-09-08 PROCEDURE — 99214 OFFICE O/P EST MOD 30 MIN: CPT | Performed by: UROLOGY

## 2022-09-08 PROCEDURE — G8427 DOCREV CUR MEDS BY ELIG CLIN: HCPCS | Performed by: UROLOGY

## 2022-09-08 PROCEDURE — 3017F COLORECTAL CA SCREEN DOC REV: CPT | Performed by: UROLOGY

## 2022-09-08 ASSESSMENT — ENCOUNTER SYMPTOMS
APNEA: 0
VOMITING: 0
WHEEZING: 0
ABDOMINAL PAIN: 0
COUGH: 0
NAUSEA: 0
BACK PAIN: 0
CONSTIPATION: 0
EYE REDNESS: 0
SHORTNESS OF BREATH: 0
COLOR CHANGE: 0
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
## 2022-11-16 ENCOUNTER — OFFICE VISIT (OUTPATIENT)
Dept: PULMONOLOGY | Age: 52
End: 2022-11-16
Payer: MEDICAID

## 2022-11-16 VITALS
WEIGHT: 293 LBS | HEART RATE: 89 BPM | RESPIRATION RATE: 22 BRPM | OXYGEN SATURATION: 98 % | SYSTOLIC BLOOD PRESSURE: 190 MMHG | HEIGHT: 66 IN | DIASTOLIC BLOOD PRESSURE: 99 MMHG | TEMPERATURE: 97.5 F | BODY MASS INDEX: 47.09 KG/M2

## 2022-11-16 DIAGNOSIS — E66.01 CLASS 3 SEVERE OBESITY DUE TO EXCESS CALORIES WITH SERIOUS COMORBIDITY AND BODY MASS INDEX (BMI) OF 60.0 TO 69.9 IN ADULT (HCC): ICD-10-CM

## 2022-11-16 DIAGNOSIS — U09.9 POST-COVID-19 SYNDROME MANIFESTING AS CHRONIC DYSPNEA: Primary | ICD-10-CM

## 2022-11-16 DIAGNOSIS — Z78.9: ICD-10-CM

## 2022-11-16 DIAGNOSIS — R06.09 POST-COVID-19 SYNDROME MANIFESTING AS CHRONIC DYSPNEA: Primary | ICD-10-CM

## 2022-11-16 DIAGNOSIS — Z98.84 HISTORY OF BARIATRIC SURGERY: ICD-10-CM

## 2022-11-16 DIAGNOSIS — J45.50 SEVERE PERSISTENT ASTHMA WITHOUT COMPLICATION: ICD-10-CM

## 2022-11-16 PROCEDURE — G8417 CALC BMI ABV UP PARAM F/U: HCPCS | Performed by: INTERNAL MEDICINE

## 2022-11-16 PROCEDURE — 3074F SYST BP LT 130 MM HG: CPT | Performed by: INTERNAL MEDICINE

## 2022-11-16 PROCEDURE — 99213 OFFICE O/P EST LOW 20 MIN: CPT | Performed by: INTERNAL MEDICINE

## 2022-11-16 PROCEDURE — 3017F COLORECTAL CA SCREEN DOC REV: CPT | Performed by: INTERNAL MEDICINE

## 2022-11-16 PROCEDURE — G8484 FLU IMMUNIZE NO ADMIN: HCPCS | Performed by: INTERNAL MEDICINE

## 2022-11-16 PROCEDURE — 1036F TOBACCO NON-USER: CPT | Performed by: INTERNAL MEDICINE

## 2022-11-16 PROCEDURE — G8427 DOCREV CUR MEDS BY ELIG CLIN: HCPCS | Performed by: INTERNAL MEDICINE

## 2022-11-16 PROCEDURE — 3078F DIAST BP <80 MM HG: CPT | Performed by: INTERNAL MEDICINE

## 2022-11-16 RX ORDER — MONTELUKAST SODIUM 10 MG/1
10 TABLET ORAL NIGHTLY
Qty: 30 TABLET | Refills: 11 | Status: SHIPPED | OUTPATIENT
Start: 2022-11-16

## 2022-11-16 RX ORDER — ALBUTEROL SULFATE 90 UG/1
2 AEROSOL, METERED RESPIRATORY (INHALATION) 4 TIMES DAILY PRN
Qty: 54 G | Refills: 11 | Status: SHIPPED | OUTPATIENT
Start: 2022-11-16

## 2022-11-16 ASSESSMENT — ENCOUNTER SYMPTOMS
EYES NEGATIVE: 1
SHORTNESS OF BREATH: 1
GASTROINTESTINAL NEGATIVE: 1

## 2022-11-16 NOTE — PATIENT INSTRUCTIONS
SURVEY:    You may be receiving a survey from [employer] regarding your visit today. Please complete the survey to enable us to provide the highest quality of care to you and your family. If you cannot score us a very good on any question, please call the office to discuss how we could have made your experience a very good one. Thank you.

## 2022-11-16 NOTE — PROGRESS NOTES
Subjective:      Patient ID: Diana Cobb is a 47 y.o. female being seen in my clinic for   Chief Complaint   Patient presents with    Follow-up     Post covid -shortness of breath-doing a little better-has to continue to use inhaler but not quite as frequently       HPI  Follow-up visit for post-COVID syndrome. Since her episode of COVID in January she has had persistent shortness of breath. She is substantially better than she was last spring. She ascribes this to the pulmonary rehab program.  Continues to participate in some sessions. Pulmonary function studies demonstrated a mild restrictive ventilatory defect. Mild reduction in diffusing capacity. Chest x-ray reviewed. Lung fields clear. Heart size not enlarged. Remains on Dulera 200 mcg, albuterol, Singulair 10 mg nightly. Needs refills. Just completed a course of prednisone apparently for acute bronchitis. Has not received her omicron booster but did receive her primary series of COVID vaccination. Last injection in July. No flu shot. Patient not currently on CPAP. States that after her bariatric surgery she was told she did not have sleep apnea. Review of Systems   Constitutional: Negative. HENT: Negative. Eyes: Negative. Respiratory:  Positive for shortness of breath. Cardiovascular:  Positive for leg swelling. Gastrointestinal: Negative. Musculoskeletal:  Positive for gait problem. All other systems reviewed and are negative.     Objective:     Vitals:    11/16/22 1347 11/16/22 1354   BP: (!) 140/102 (!) 190/99   Site: Left Lower Arm Left Lower Arm   Position: Sitting Sitting   Cuff Size: Medium Adult Medium Adult   Pulse: 87 89   Resp: 22    Temp: 97.5 °F (36.4 °C)    TempSrc: Temporal    SpO2: 98%    Weight: (!) 411 lb 1.6 oz (186.5 kg)    Height: 5' 6\" (1.676 m)      Current Outpatient Medications   Medication Sig Dispense Refill    mometasone-formoterol (DULERA) 200-5 MCG/ACT inhaler Inhale 2 puffs into the lungs in the morning and 2 puffs in the evening. 1 each 11    albuterol sulfate HFA (VENTOLIN HFA) 108 (90 Base) MCG/ACT inhaler Inhale 2 puffs into the lungs 4 times daily as needed for Wheezing 54 g 11    montelukast (SINGULAIR) 10 MG tablet Take 1 tablet by mouth nightly 30 tablet 11    irbesartan (AVAPRO) 300 MG tablet Take 300 mg by mouth nightly      spironolactone (ALDACTONE) 25 MG tablet Take 50 mg by mouth daily       amLODIPine (NORVASC) 10 MG tablet take 1 tablet by mouth once daily      ascorbic acid (VITAMIN C) 500 MG tablet Take 1 tablet by mouth 2 times daily for 7 days 14 tablet 0    EPINEPHrine (EPIPEN) 0.3 MG/0.3ML SOAJ injection Inject 0.3 mg into the muscle as needed Use as directed for allergic reaction      atorvastatin (LIPITOR) 20 MG tablet Take 1 tablet by mouth daily 30 tablet 1    gabapentin (NEURONTIN) 100 MG capsule take 3 capsules by mouth at bedtime. (Patient taking differently: Take 100 mg by mouth daily.  Takes 1 at bedtime) 90 capsule 2    tiZANidine (ZANAFLEX) 4 MG tablet Take 4 mg by mouth as needed  0    ranitidine (ZANTAC) 300 MG tablet Take 1 tablet by mouth nightly 30 tablet 3    metoclopramide (REGLAN) 5 MG tablet Take 1 tablet by mouth 3 times daily 120 tablet 3    pantoprazole (PROTONIX) 40 MG tablet take 1 tablet by mouth once daily 30 tablet 3    fluticasone (FLONASE) 50 MCG/ACT nasal spray 2 sprays by Nasal route daily 16 g 11    docusate sodium (COLACE) 100 MG capsule Take 100 mg by mouth 2 times daily      potassium chloride (KLOR-CON) 10 MEQ extended release tablet Take 10 mEq by mouth daily  1    nabumetone (RELAFEN) 500 MG tablet Take 500 mg by mouth 2 times daily      Cholecalciferol (VITAMIN D PO) Take 1 tablet by mouth every morning       vitamin B-1 (THIAMINE) 100 MG tablet Take 100 mg by mouth daily       ferrous sulfate 325 (65 FE) MG tablet Take 325 mg by mouth daily (with breakfast)      Multiple Vitamin (MVI, BARIATRIC ADVANTAGE MULTI-FORMULA, CHEW TAB) Take 1 tablet by mouth 2 times daily BA caps with iron -2 caps daily      Calcium Citrate-Vitamin D (CALCIUM CITRATE + D PO) Take 500 mg by mouth 3 times daily      albuterol (PROVENTIL) (2.5 MG/3ML) 0.083% nebulizer solution INHALE CONTENTS OF 1 VIAL BY MOUTH VIA NEBULIZER EVERY 6 HOURS AS NEEDED 360 mL 11    silver sulfADIAZINE (SILVADENE) 1 % cream Apply  topically 2 times daily as needed. Apply topically daily. 100 g 1     No current facility-administered medications for this visit. Physical Exam  Vitals and nursing note reviewed. Constitutional:       Appearance: She is well-developed. She is obese. Comments: Extreme obesity. HENT:      Head: Normocephalic. Nose: Nose normal. No congestion. Mouth/Throat:      Mouth: Mucous membranes are moist.      Pharynx: Oropharynx is clear. No oropharyngeal exudate. Eyes:      General: No scleral icterus. Conjunctiva/sclera: Conjunctivae normal.   Neck:      Thyroid: No thyromegaly. Vascular: No JVD. Trachea: No tracheal deviation. Cardiovascular:      Rate and Rhythm: Normal rate and regular rhythm. Heart sounds: Normal heart sounds. No murmur heard. No gallop. Pulmonary:      Effort: Pulmonary effort is normal. No respiratory distress. Breath sounds: No wheezing or rales. Chest:      Chest wall: No tenderness. Abdominal:      Palpations: Abdomen is soft. Tenderness: There is no abdominal tenderness. Musculoskeletal:      Cervical back: Neck supple. Right lower leg: Edema present. Left lower leg: Edema present. Comments: Severe lymphedema. Lymphadenopathy:      Cervical: No cervical adenopathy. Skin:     General: Skin is warm and dry. Neurological:      Mental Status: She is alert and oriented to person, place, and time.      Wt Readings from Last 3 Encounters:   11/16/22 (!) 411 lb 1.6 oz (186.5 kg)   09/08/22 (!) 402 lb (182.3 kg)   08/31/22 (!) 407 lb (184.6 kg)     Results for orders placed or performed during the hospital encounter of 08/29/22   CBC with Auto Differential   Result Value Ref Range    WBC 7.9 3.5 - 11.3 k/uL    RBC 3.35 (L) 3.95 - 5.11 m/uL    Hemoglobin 10.0 (L) 11.9 - 15.1 g/dL    Hematocrit 32.8 (L) 36.3 - 47.1 %    MCV 97.9 82.6 - 102.9 fL    MCH 29.9 25.2 - 33.5 pg    MCHC 30.5 28.4 - 34.8 g/dL    RDW 13.5 11.8 - 14.4 %    Platelets 378 276 - 812 k/uL    MPV 11.5 8.1 - 13.5 fL    NRBC Automated 0.0 0.0 per 100 WBC    Seg Neutrophils 61 36 - 65 %    Lymphocytes 29 24 - 43 %    Monocytes 9 3 - 12 %    Eosinophils % 0 (L) 1 - 4 %    Basophils 1 0 - 2 %    Immature Granulocytes 0 0 %    Segs Absolute 4.81 1.50 - 8.10 k/uL    Absolute Lymph # 2.30 1.10 - 3.70 k/uL    Absolute Mono # 0.71 0.10 - 1.20 k/uL    Absolute Eos # <0.03 0.00 - 0.44 k/uL    Basophils Absolute 0.05 0.00 - 0.20 k/uL    Absolute Immature Granulocyte 0.03 0.00 - 0.30 k/uL   Iron and TIBC   Result Value Ref Range    Iron 46 37 - 145 ug/dL    TIBC 228 (L) 250 - 450 ug/dL    Iron Saturation 20 20 - 55 %    UIBC 182 112 - 347 ug/dL   Ferritin   Result Value Ref Range    Ferritin 479 (H) 13 - 150 ng/mL       :      1. 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 Severe persistent asthma without complication    3. Class 3 severe obesity due to excess calories with serious comorbidity and body mass index (BMI) of 60.0 to 69.9 in adult ([address]    4. History of bariatric surgery    5.  Pulmonary rehabilitation completed      Patient Active Problem List   Diagnosis    Pain in joint, lower leg    Myalgia and myositis    Lymphedema    KRISSY (iron deficiency anemia)    HTN, goal below 140/90    DAVIDSON on CPAP    Asthma, chronic    Allergic rhinitis    Vitamin D deficiency    Chronic low back pain    Hyperlipidemia    Cholelithiasis    Arthritis    Thiamine deficiency    Chronic cholecystitis    Ganglion cyst    Closed fracture of acromial process of right scapula    Closed dislocation of glenohumeral joint Shoulder fracture, right    Morbid obesity with BMI of 60.0-69.9, adult (HCC)    LUQ abdominal pain    S/P laparoscopic sleeve gastrectomy    Subluxation of tendon of long head of biceps    Prediabetes    Headaches due to old head injury    Hearing loss    Lumbar stenosis    TBI (traumatic brain injury)    Hyperparathyroidism ([address]    Iron malabsorption         Plan:      Refilled bronchodilators. Continue exercise and weight loss. Will help asthma control. Encouraged omicron booster and flu shot. Discussed strategies for management of asthma including escalation and de-escalation of therapy. Return in 6 months. Orders Placed This Encounter   Medications    mometasone-formoterol (DULERA) 200-5 MCG/ACT inhaler     Sig: Inhale 2 puffs into the lungs in the morning and 2 puffs in the evening. Dispense:  1 each     Refill:  11    albuterol sulfate HFA (VENTOLIN HFA) 108 (90 Base) MCG/ACT inhaler     Sig: Inhale 2 puffs into the lungs 4 times daily as needed for Wheezing     Dispense:  54 g     Refill:  11    montelukast (SINGULAIR) 10 MG tablet     Sig: Take 1 tablet by mouth nightly     Dispense:  30 tablet     Refill:  11     No orders of the defined types were placed in this encounter. Return in about 6 months (around 5/16/2023).        Electronically signed by Deuce Beltran DO on 11/16/2022at 2:21 PM

## 2022-11-22 ENCOUNTER — OFFICE VISIT (OUTPATIENT)
Dept: UROLOGY | Age: 52
End: 2022-11-22
Payer: MEDICAID

## 2022-11-22 VITALS
HEIGHT: 66 IN | HEART RATE: 76 BPM | DIASTOLIC BLOOD PRESSURE: 98 MMHG | SYSTOLIC BLOOD PRESSURE: 169 MMHG | BODY MASS INDEX: 47.09 KG/M2 | WEIGHT: 293 LBS | TEMPERATURE: 98.4 F

## 2022-11-22 DIAGNOSIS — N32.81 OAB (OVERACTIVE BLADDER): ICD-10-CM

## 2022-11-22 DIAGNOSIS — R35.1 NOCTURIA: ICD-10-CM

## 2022-11-22 DIAGNOSIS — R35.0 FREQUENCY OF URINATION: ICD-10-CM

## 2022-11-22 DIAGNOSIS — N39.46 MIXED INCONTINENCE: Primary | ICD-10-CM

## 2022-11-22 PROCEDURE — 3078F DIAST BP <80 MM HG: CPT | Performed by: NURSE PRACTITIONER

## 2022-11-22 PROCEDURE — 1036F TOBACCO NON-USER: CPT | Performed by: NURSE PRACTITIONER

## 2022-11-22 PROCEDURE — G8427 DOCREV CUR MEDS BY ELIG CLIN: HCPCS | Performed by: NURSE PRACTITIONER

## 2022-11-22 PROCEDURE — 99214 OFFICE O/P EST MOD 30 MIN: CPT | Performed by: NURSE PRACTITIONER

## 2022-11-22 PROCEDURE — 3017F COLORECTAL CA SCREEN DOC REV: CPT | Performed by: NURSE PRACTITIONER

## 2022-11-22 PROCEDURE — 51798 US URINE CAPACITY MEASURE: CPT | Performed by: NURSE PRACTITIONER

## 2022-11-22 PROCEDURE — G8484 FLU IMMUNIZE NO ADMIN: HCPCS | Performed by: NURSE PRACTITIONER

## 2022-11-22 PROCEDURE — 3074F SYST BP LT 130 MM HG: CPT | Performed by: NURSE PRACTITIONER

## 2022-11-22 PROCEDURE — G8417 CALC BMI ABV UP PARAM F/U: HCPCS | Performed by: NURSE PRACTITIONER

## 2022-11-22 RX ORDER — OXYBUTYNIN CHLORIDE 10 MG/1
10 TABLET, EXTENDED RELEASE ORAL DAILY
Qty: 30 TABLET | Refills: 3 | Status: SHIPPED | OUTPATIENT
Start: 2022-11-22

## 2022-11-22 ASSESSMENT — ENCOUNTER SYMPTOMS
COLOR CHANGE: 0
CONSTIPATION: 0
COUGH: 0
ABDOMINAL PAIN: 0
EYE REDNESS: 0
APNEA: 0
NAUSEA: 0
VOMITING: 0
WHEEZING: 0
BACK PAIN: 0
SHORTNESS OF BREATH: 0
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
## 2022-11-22 NOTE — PATIENT INSTRUCTIONS
SURVEY:    You may be receiving a survey from [employer] regarding your visit today. Please complete the survey to enable us to provide the highest quality of care to you and your family. If you cannot score us a very good on any question, please call the office to discuss how we could have made your experience a very good one. Thank you.

## 2022-11-29 ENCOUNTER — HOSPITAL ENCOUNTER (OUTPATIENT)
Age: 52
Discharge: HOME | End: 2022-11-29
Payer: MEDICAID

## 2022-11-29 DIAGNOSIS — D64.9 ANEMIA, UNSPECIFIED TYPE: ICD-10-CM

## 2022-11-29 DIAGNOSIS — D50.9 IRON DEFICIENCY ANEMIA, UNSPECIFIED IRON DEFICIENCY ANEMIA TYPE: ICD-10-CM

## 2022-11-29 DIAGNOSIS — K90.9 IRON MALABSORPTION: ICD-10-CM

## 2022-11-29 LAB
ABSOLUTE EOS #: 0.08 K/UL (ref 0–0.44)
ABSOLUTE IMMATURE GRANULOCYTE: 0.08 K/UL (ref 0–0.3)
ABSOLUTE LYMPH #: 2.46 K/UL (ref 1.1–3.7)
ABSOLUTE MONO #: 0.15 K/UL (ref 0.1–1.2)
ABSOLUTE RETIC #: 0.06 M/UL (ref 0.03–0.08)
ALBUMIN SERPL-MCNC: 4.4 G/DL (ref 3.5–5.2)
ALBUMIN/GLOBULIN RATIO: 1.1 (ref 1–2.5)
ALP BLD-CCNC: 86 U/L (ref 35–104)
ALT SERPL-CCNC: 6 U/L (ref 5–33)
ANION GAP SERPL CALCULATED.3IONS-SCNC: 17 MMOL/L (ref 9–17)
AST SERPL-CCNC: 10 U/L
BASOPHILS # BLD: 0 % (ref 0–2)
BASOPHILS ABSOLUTE: 0 K/UL (ref 0–0.2)
BILIRUB SERPL-MCNC: 0.3 MG/DL (ref 0.3–1.2)
BUN BLDV-MCNC: 41 MG/DL (ref 6–20)
BUN/CREAT BLD: 8 (ref 9–20)
CALCIUM SERPL-MCNC: 9.7 MG/DL (ref 8.6–10.4)
CHLORIDE BLD-SCNC: 104 MMOL/L (ref 98–107)
CO2: 15 MMOL/L (ref 20–31)
CREAT SERPL-MCNC: 4.98 MG/DL (ref 0.5–0.9)
EOSINOPHILS RELATIVE PERCENT: 1 % (ref 1–4)
FERRITIN: 455 NG/ML (ref 13–150)
GFR SERPL CREATININE-BSD FRML MDRD: 10 ML/MIN/1.73M2
GLUCOSE BLD-MCNC: 102 MG/DL (ref 70–99)
HCT VFR BLD CALC: 37.9 % (ref 36.3–47.1)
HEMOGLOBIN: 11.7 G/DL (ref 11.9–15.1)
IMMATURE GRANULOCYTES: 1 %
IMMATURE RETIC FRACT: 14.8 % (ref 2.7–18.3)
IRON SATURATION: 22 % (ref 20–55)
IRON: 51 UG/DL (ref 37–145)
LYMPHOCYTES # BLD: 32 % (ref 24–43)
MCH RBC QN AUTO: 30.3 PG (ref 25.2–33.5)
MCHC RBC AUTO-ENTMCNC: 30.9 G/DL (ref 28.4–34.8)
MCV RBC AUTO: 98.2 FL (ref 82.6–102.9)
MONOCYTES # BLD: 2 % (ref 3–12)
MORPHOLOGY: ABNORMAL
NRBC AUTOMATED: 0 PER 100 WBC
PDW BLD-RTO: 14.3 % (ref 11.8–14.4)
PLATELET # BLD: 288 K/UL (ref 138–453)
PMV BLD AUTO: 10.5 FL (ref 8.1–13.5)
POTASSIUM SERPL-SCNC: 4.8 MMOL/L (ref 3.7–5.3)
RBC # BLD: 3.86 M/UL (ref 3.95–5.11)
RETIC %: 1.4 % (ref 0.5–1.9)
RETIC HEMOGLOBIN: 33.7 PG (ref 28.2–35.7)
SEG NEUTROPHILS: 64 % (ref 36–65)
SEGMENTED NEUTROPHILS ABSOLUTE COUNT: 4.93 K/UL (ref 1.5–8.1)
SODIUM BLD-SCNC: 136 MMOL/L (ref 135–144)
TOTAL IRON BINDING CAPACITY: 232 UG/DL (ref 250–450)
TOTAL PROTEIN: 8.4 G/DL (ref 6.4–8.3)
UNSATURATED IRON BINDING CAPACITY: 181 UG/DL (ref 112–347)
WBC # BLD: 7.7 K/UL (ref 3.5–11.3)

## 2022-11-29 PROCEDURE — 36415 COLL VENOUS BLD VENIPUNCTURE: CPT

## 2022-11-29 PROCEDURE — 84155 ASSAY OF PROTEIN SERUM: CPT

## 2022-11-29 PROCEDURE — 83540 ASSAY OF IRON: CPT

## 2022-11-29 PROCEDURE — 85045 AUTOMATED RETICULOCYTE COUNT: CPT

## 2022-11-29 PROCEDURE — 83550 IRON BINDING TEST: CPT

## 2022-11-29 PROCEDURE — 83521 IG LIGHT CHAINS FREE EACH: CPT

## 2022-11-29 PROCEDURE — 82728 ASSAY OF FERRITIN: CPT

## 2022-11-29 PROCEDURE — 84165 PROTEIN E-PHORESIS SERUM: CPT

## 2022-11-29 PROCEDURE — 85025 COMPLETE CBC W/AUTO DIFF WBC: CPT

## 2022-11-29 PROCEDURE — 80053 COMPREHEN METABOLIC PANEL: CPT

## 2022-11-30 ENCOUNTER — OFFICE VISIT (OUTPATIENT)
Dept: ONCOLOGY | Age: 52
End: 2022-11-30
Payer: MEDICAID

## 2022-11-30 VITALS
WEIGHT: 293 LBS | HEART RATE: 62 BPM | TEMPERATURE: 95.8 F | SYSTOLIC BLOOD PRESSURE: 189 MMHG | RESPIRATION RATE: 18 BRPM | BODY MASS INDEX: 65.53 KG/M2 | DIASTOLIC BLOOD PRESSURE: 97 MMHG

## 2022-11-30 DIAGNOSIS — D64.9 ANEMIA, UNSPECIFIED TYPE: ICD-10-CM

## 2022-11-30 DIAGNOSIS — K90.9 IRON MALABSORPTION: ICD-10-CM

## 2022-11-30 DIAGNOSIS — D50.9 IRON DEFICIENCY ANEMIA, UNSPECIFIED IRON DEFICIENCY ANEMIA TYPE: Primary | ICD-10-CM

## 2022-11-30 LAB
ALBUMIN (CALCULATED): 4.6 G/DL (ref 3.2–5.2)
ALBUMIN PERCENT: 60 % (ref 45–65)
ALPHA 1 PERCENT: 3 % (ref 3–6)
ALPHA 2 PERCENT: 12 % (ref 6–13)
ALPHA-1-GLOBULIN: 0.3 G/DL (ref 0.1–0.4)
ALPHA-2-GLOBULIN: 0.9 G/DL (ref 0.5–0.9)
BETA GLOBULIN: 0.8 G/DL (ref 0.5–1.1)
BETA PERCENT: 10 % (ref 11–19)
FREE KAPPA/LAMBDA RATIO: 5 (ref 0.26–1.65)
GAMMA GLOBULIN %: 15 % (ref 9–20)
GAMMA GLOBULIN: 1.2 G/DL (ref 0.5–1.5)
KAPPA FREE LIGHT CHAINS QNT: 20.21 MG/DL (ref 0.37–1.94)
LAMBDA FREE LIGHT CHAINS QNT: 4.04 MG/DL (ref 0.57–2.63)
PATHOLOGIST: ABNORMAL
PROTEIN ELECTROPHORESIS, SERUM: ABNORMAL
TOTAL PROT. SUM,%: 100 % (ref 98–102)
TOTAL PROT. SUM: 7.8 G/DL (ref 6.3–8.2)
TOTAL PROTEIN: 7.7 G/DL (ref 6.4–8.3)

## 2022-11-30 PROCEDURE — G8484 FLU IMMUNIZE NO ADMIN: HCPCS | Performed by: INTERNAL MEDICINE

## 2022-11-30 PROCEDURE — G8417 CALC BMI ABV UP PARAM F/U: HCPCS | Performed by: INTERNAL MEDICINE

## 2022-11-30 PROCEDURE — 99214 OFFICE O/P EST MOD 30 MIN: CPT | Performed by: INTERNAL MEDICINE

## 2022-11-30 PROCEDURE — G8428 CUR MEDS NOT DOCUMENT: HCPCS | Performed by: INTERNAL MEDICINE

## 2022-11-30 PROCEDURE — 3074F SYST BP LT 130 MM HG: CPT | Performed by: INTERNAL MEDICINE

## 2022-11-30 PROCEDURE — 3017F COLORECTAL CA SCREEN DOC REV: CPT | Performed by: INTERNAL MEDICINE

## 2022-11-30 PROCEDURE — 3078F DIAST BP <80 MM HG: CPT | Performed by: INTERNAL MEDICINE

## 2022-11-30 PROCEDURE — 1036F TOBACCO NON-USER: CPT | Performed by: INTERNAL MEDICINE
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
## 2022-12-07 ENCOUNTER — HOSPITAL ENCOUNTER (OUTPATIENT)
Dept: WOMENS IMAGING | Age: 52
Discharge: HOME | End: 2022-12-09
Payer: MEDICAID

## 2022-12-07 DIAGNOSIS — Z12.31 SCREENING MAMMOGRAM FOR BREAST CANCER: ICD-10-CM

## 2022-12-07 PROCEDURE — 77067 SCR MAMMO BI INCL CAD: CPT

## 2023-03-19 ENCOUNTER — HOSPITAL ENCOUNTER (EMERGENCY)
Age: 53
Discharge: HOME | DRG: 469 | End: 2023-03-19
Payer: MEDICAID

## 2023-03-19 VITALS
SYSTOLIC BLOOD PRESSURE: 175 MMHG | BODY MASS INDEX: 66.18 KG/M2 | OXYGEN SATURATION: 96 % | RESPIRATION RATE: 18 BRPM | HEART RATE: 78 BPM | DIASTOLIC BLOOD PRESSURE: 69 MMHG | TEMPERATURE: 98.4 F | WEIGHT: 293 LBS

## 2023-03-19 DIAGNOSIS — N28.9 KIDNEY FUNCTION ABNORMAL: ICD-10-CM

## 2023-03-19 DIAGNOSIS — S81.811A ISTAP TYPE 1 SKIN TEAR OF RIGHT LOWER EXTREMITY: Primary | ICD-10-CM

## 2023-03-19 LAB
ABSOLUTE EOS #: 0.36 K/UL (ref 0–0.44)
ABSOLUTE IMMATURE GRANULOCYTE: 0 K/UL (ref 0–0.3)
ABSOLUTE LYMPH #: 1.37 K/UL (ref 1.1–3.7)
ABSOLUTE MONO #: 0.46 K/UL (ref 0.1–1.2)
ALBUMIN SERPL-MCNC: 3.8 G/DL (ref 3.5–5.2)
ALBUMIN/GLOBULIN RATIO: 1 (ref 1–2.5)
ALP SERPL-CCNC: 89 U/L (ref 35–104)
ALT SERPL-CCNC: 8 U/L (ref 5–33)
ANION GAP SERPL CALCULATED.3IONS-SCNC: 15 MMOL/L (ref 9–17)
AST SERPL-CCNC: 16 U/L
BASOPHILS # BLD: 0 % (ref 0–2)
BASOPHILS ABSOLUTE: 0 K/UL (ref 0–0.2)
BILIRUB SERPL-MCNC: 0.4 MG/DL (ref 0.3–1.2)
BUN SERPL-MCNC: 26 MG/DL (ref 6–20)
BUN/CREAT BLD: 7 (ref 9–20)
CALCIUM SERPL-MCNC: 9.2 MG/DL (ref 8.6–10.4)
CHLORIDE SERPL-SCNC: 107 MMOL/L (ref 98–107)
CO2 SERPL-SCNC: 16 MMOL/L (ref 20–31)
CREAT SERPL-MCNC: 3.92 MG/DL (ref 0.5–0.9)
EOSINOPHILS RELATIVE PERCENT: 4 % (ref 1–4)
GFR SERPL CREATININE-BSD FRML MDRD: 13 ML/MIN/1.73M2
GLUCOSE SERPL-MCNC: 89 MG/DL (ref 70–99)
HCT VFR BLD AUTO: 34.1 % (ref 36.3–47.1)
HGB BLD-MCNC: 10.8 G/DL (ref 11.9–15.1)
IMMATURE GRANULOCYTES: 0 %
LYMPHOCYTES # BLD: 15 % (ref 24–43)
MCH RBC QN AUTO: 29.8 PG (ref 25.2–33.5)
MCHC RBC AUTO-ENTMCNC: 31.7 G/DL (ref 28.4–34.8)
MCV RBC AUTO: 93.9 FL (ref 82.6–102.9)
MONOCYTES # BLD: 5 % (ref 3–12)
MORPHOLOGY: ABNORMAL
NRBC AUTOMATED: 0 PER 100 WBC
PDW BLD-RTO: 13.9 % (ref 11.8–14.4)
PLATELET # BLD AUTO: ABNORMAL K/UL (ref 138–453)
PLATELET, FLUORESCENCE: 242 K/UL (ref 138–453)
PLATELET, IMMATURE FRACTION: 5.2 % (ref 1.1–10.3)
POTASSIUM SERPL-SCNC: 5.2 MMOL/L (ref 3.7–5.3)
PROT SERPL-MCNC: 7.8 G/DL (ref 6.4–8.3)
RBC # BLD: 3.63 M/UL (ref 3.95–5.11)
SEG NEUTROPHILS: 76 % (ref 36–65)
SEGMENTED NEUTROPHILS ABSOLUTE COUNT: 6.91 K/UL (ref 1.5–8.1)
SODIUM SERPL-SCNC: 138 MMOL/L (ref 135–144)
WBC # BLD AUTO: 9.1 K/UL (ref 3.5–11.3)

## 2023-03-19 PROCEDURE — 36415 COLL VENOUS BLD VENIPUNCTURE: CPT

## 2023-03-19 PROCEDURE — 6370000000 HC RX 637 (ALT 250 FOR IP): Performed by: PHYSICIAN ASSISTANT

## 2023-03-19 PROCEDURE — 85025 COMPLETE CBC W/AUTO DIFF WBC: CPT

## 2023-03-19 PROCEDURE — 80053 COMPREHEN METABOLIC PANEL: CPT

## 2023-03-19 PROCEDURE — 99283 EMERGENCY DEPT VISIT LOW MDM: CPT

## 2023-03-19 RX ORDER — SULFAMETHOXAZOLE AND TRIMETHOPRIM 800; 160 MG/1; MG/1
1 TABLET ORAL ONCE
Status: COMPLETED | OUTPATIENT
Start: 2023-03-19 | End: 2023-03-19

## 2023-03-19 RX ORDER — BUMETANIDE 2 MG/1
2 TABLET ORAL 2 TIMES DAILY
Status: ON HOLD | COMMUNITY
End: 2023-03-27 | Stop reason: HOSPADM

## 2023-03-19 RX ORDER — SULFAMETHOXAZOLE AND TRIMETHOPRIM 800; 160 MG/1; MG/1
1 TABLET ORAL 2 TIMES DAILY
Qty: 10 TABLET | Refills: 0 | Status: ON HOLD | OUTPATIENT
Start: 2023-03-19 | End: 2023-03-27 | Stop reason: HOSPADM

## 2023-03-19 RX ADMIN — SULFAMETHOXAZOLE AND TRIMETHOPRIM 1 TABLET: 800; 160 TABLET ORAL at 20:54

## 2023-03-19 ASSESSMENT — ENCOUNTER SYMPTOMS
ROS SKIN COMMENTS: SEE HPI
RESPIRATORY NEGATIVE: 1

## 2023-03-19 ASSESSMENT — PAIN - FUNCTIONAL ASSESSMENT: PAIN_FUNCTIONAL_ASSESSMENT: NONE - DENIES PAIN

## 2023-03-19 NOTE — ED PROVIDER NOTES
[facility] ED  EMERGENCY DEPARTMENT ENCOUNTER      Pt Name: Gonzalo Evans  MRN: [id number]  [place] 1970  Date of evaluation: 3/19/2023  Provider: Chaim Whitney Dr       Chief Complaint   Patient presents with    Abrasion     Pt states she has lymphedema and believes increased rubbing over the last couple weeks has irritated the skin on her right thigh. Pt states she woke up to her bed wet from drainage on the back of thigh         HISTORY OF PRESENT ILLNESS   (Location/Symptom, Timing/Onset, Context/Setting, Quality, Duration, Modifying Factors, Severity)  Note limiting factors. Gonzalo Evans is a 47 y.o. female who presents to the emergency department PMH lymphedema bilateral lower extremities EMEA, CKD reports questionable wound on the backside of her right leg as patient reports over the past few weeks she has had to wear compression garments to her lower extremities due to lymphedema noting its been rubbing and she is concerned she may have an infected wound. Patient reports she cannot visualize area. Patient reports she takes Bumex and spironolactone as directed. Patient reports history of COVID and notes she has been diagnosed a \"COVID long-hauler\" by her pulmonologist.  Patient denies worsening chest pain acute shortness of breath history of fever calf pain, pain to wound site or other complaints. Patient admits to history of \"low immune system. \"    HPI    Nursing Notes were reviewed. REVIEW OF SYSTEMS    (2-9 systems for level 4, 10 or more for level 5)     Review of Systems   Constitutional: Negative. Negative for fever. HENT: Negative. Respiratory: Negative. Cardiovascular: Negative. Musculoskeletal: Negative. Skin:         See hpi   Neurological: Negative. Except as noted above the remainder of the review of systems was reviewed and negative.        PAST MEDICAL HISTORY     Past Medical History:   Diagnosis Date    Anemia 2008    IRON daily    BUMETANIDE (BUMEX) 2 MG TABLET    Take 2 mg by mouth 2 times daily    CALCIUM CITRATE-VITAMIN D (CALCIUM CITRATE + D PO)    Take 500 mg by mouth 3 times daily    CHOLECALCIFEROL (VITAMIN D PO)    Take 1 tablet by mouth every morning     DOCUSATE SODIUM (COLACE) 100 MG CAPSULE    Take 100 mg by mouth 2 times daily    EPINEPHRINE (EPIPEN) 0.3 MG/0.3ML SOAJ INJECTION    Inject 0.3 mg into the muscle as needed Use as directed for allergic reaction    FERROUS SULFATE 325 (65 FE) MG TABLET    Take 325 mg by mouth daily (with breakfast)    FLUTICASONE (FLONASE) 50 MCG/ACT NASAL SPRAY    2 sprays by Nasal route daily    GABAPENTIN (NEURONTIN) 100 MG CAPSULE    take 3 capsules by mouth at bedtime. IRBESARTAN (AVAPRO) 300 MG TABLET    Take 300 mg by mouth nightly    METOCLOPRAMIDE (REGLAN) 5 MG TABLET    Take 1 tablet by mouth 3 times daily    MOMETASONE-FORMOTEROL (DULERA) 200-5 MCG/ACT INHALER    Inhale 2 puffs into the lungs in the morning and 2 puffs in the evening. MONTELUKAST (SINGULAIR) 10 MG TABLET    Take 1 tablet by mouth nightly    MULTIPLE VITAMIN (MVI, BARIATRIC ADVANTAGE MULTI-FORMULA, CHEW TAB)    Take 1 tablet by mouth 2 times daily BA caps with iron -2 caps daily    NABUMETONE (RELAFEN) 500 MG TABLET    Take 500 mg by mouth 2 times daily    OXYBUTYNIN (DITROPAN XL) 10 MG EXTENDED RELEASE TABLET    Take 1 tablet by mouth daily    PANTOPRAZOLE (PROTONIX) 40 MG TABLET    take 1 tablet by mouth once daily    POTASSIUM CHLORIDE (KLOR-CON) 10 MEQ EXTENDED RELEASE TABLET    Take 10 mEq by mouth daily    RANITIDINE (ZANTAC) 300 MG TABLET    Take 1 tablet by mouth nightly    SILVER SULFADIAZINE (SILVADENE) 1 % CREAM    Apply  topically 2 times daily as needed. Apply topically daily.     SPIRONOLACTONE (ALDACTONE) 25 MG TABLET    Take 50 mg by mouth daily     TIZANIDINE (ZANAFLEX) 4 MG TABLET    Take 4 mg by mouth as needed    VITAMIN B-1 (THIAMINE) 100 MG TABLET    Take 100 mg by mouth daily

## 2023-03-20 NOTE — DISCHARGE INSTRUCTIONS
Follow-up with primary care doctor later this week for reevaluation of wound on right lower leg. It is important that you phone the kidney doctor, nephrologist first thing in the morning and set up an appointment. Continue home medications as prescribed. Promptly return to emergency department for new, changing, worsening of symptoms or other concerns.

## 2023-03-21 NOTE — PROGRESS NOTES
This chart came to me? Whose patient is this/  Her creatinine is severely elevated  Who is seeing for nephrology?   If none, then needs appt with nephrology ASAP

## 2023-03-22 ENCOUNTER — TELEPHONE (OUTPATIENT)
Dept: NEPHROLOGY | Age: 53
End: 2023-03-22

## 2023-03-22 ENCOUNTER — HOSPITAL ENCOUNTER (INPATIENT)
Age: 53
LOS: 5 days | Discharge: HOME | DRG: 469 | End: 2023-03-27
Attending: STUDENT IN AN ORGANIZED HEALTH CARE EDUCATION/TRAINING PROGRAM | Admitting: STUDENT IN AN ORGANIZED HEALTH CARE EDUCATION/TRAINING PROGRAM
Payer: MEDICAID

## 2023-03-22 ENCOUNTER — HOSPITAL ENCOUNTER (OUTPATIENT)
Age: 53
Discharge: HOME | End: 2023-03-22
Payer: MEDICAID

## 2023-03-22 ENCOUNTER — HOSPITAL ENCOUNTER (OUTPATIENT)
Dept: ULTRASOUND IMAGING | Age: 53
Discharge: HOME | End: 2023-03-24
Payer: MEDICAID

## 2023-03-22 DIAGNOSIS — S71.109A WOUND OF THIGH: ICD-10-CM

## 2023-03-22 DIAGNOSIS — N17.9 ACUTE RENAL FAILURE, UNSPECIFIED ACUTE RENAL FAILURE TYPE (HCC): Primary | ICD-10-CM

## 2023-03-22 DIAGNOSIS — N17.9 AKI (ACUTE KIDNEY INJURY) (HCC): ICD-10-CM

## 2023-03-22 DIAGNOSIS — E87.20 METABOLIC ACIDOSIS WITH INCREASED ANION GAP AND ACCUMULATION OF ORGANIC ACIDS: ICD-10-CM

## 2023-03-22 DIAGNOSIS — N17.9 AKI (ACUTE KIDNEY INJURY) (HCC): Primary | ICD-10-CM

## 2023-03-22 LAB
ANION GAP SERPL CALCULATED.3IONS-SCNC: 19 MMOL/L (ref 9–17)
BETA-HYDROXYBUTYRATE: 0.17 MMOL/L (ref 0.02–0.27)
BILIRUBIN URINE: NEGATIVE
BUN SERPL-MCNC: 25 MG/DL (ref 6–20)
BUN/CREAT BLD: 6 (ref 9–20)
CALCIUM SERPL-MCNC: 9.1 MG/DL (ref 8.6–10.4)
CHLORIDE SERPL-SCNC: 102 MMOL/L (ref 98–107)
CHLORIDE, UR: 47 MMOL/L
CO2 SERPL-SCNC: 14 MMOL/L (ref 20–31)
COLOR: YELLOW
CREAT SERPL-MCNC: 4.39 MG/DL (ref 0.5–0.9)
CREATININE URINE: 108.2 MG/DL (ref 28–217)
EPITHELIAL CELLS UA: ABNORMAL /HPF (ref 0–25)
GFR SERPL CREATININE-BSD FRML MDRD: 12 ML/MIN/1.73M2
GLUCOSE SERPL-MCNC: 84 MG/DL (ref 70–99)
GLUCOSE UR STRIP.AUTO-MCNC: NEGATIVE MG/DL
HCO3 VENOUS: 18.3 MMOL/L (ref 24–30)
HCT VFR BLD AUTO: 35.6 % (ref 36.3–47.1)
HGB BLD-MCNC: 11.1 G/DL (ref 11.9–15.1)
KETONES UR STRIP.AUTO-MCNC: NEGATIVE MG/DL
LACTATE PLASV-SCNC: 2.1 MMOL/L (ref 0.5–2.2)
LEUKOCYTE ESTERASE UR QL STRIP.AUTO: NEGATIVE
MAGNESIUM SERPL-MCNC: 1.9 MG/DL (ref 1.6–2.6)
MCH RBC QN AUTO: 29.8 PG (ref 25.2–33.5)
MCHC RBC AUTO-ENTMCNC: 31.2 G/DL (ref 28.4–34.8)
MCV RBC AUTO: 95.7 FL (ref 82.6–102.9)
NEGATIVE BASE EXCESS, VEN: 7.1 MMOL/L (ref 0–2)
NITRITE UR QL STRIP.AUTO: NEGATIVE
NRBC AUTOMATED: 0 PER 100 WBC
O2 SAT, VEN: 63.2 % (ref 60–85)
PCO2, VEN: 36.6 MM HG (ref 39–55)
PDW BLD-RTO: 14.1 % (ref 11.8–14.4)
PH VENOUS: 7.32 (ref 7.32–7.42)
PHOSPHATE SERPL-MCNC: 2.7 MG/DL (ref 2.6–4.5)
PLATELET # BLD AUTO: 294 K/UL (ref 138–453)
PMV BLD AUTO: 9.9 FL (ref 8.1–13.5)
PO2, VEN: 35.3 MM HG (ref 30–50)
POTASSIUM SERPL-SCNC: 4.1 MMOL/L (ref 3.7–5.3)
PROT UR STRIP.AUTO-MCNC: 6 MG/DL (ref 5–9)
PROT UR STRIP.AUTO-MCNC: ABNORMAL MG/DL
RBC # BLD: 3.72 M/UL (ref 3.95–5.11)
RBC CLUMPS #/AREA URNS AUTO: ABNORMAL /HPF (ref 0–2)
SODIUM SERPL-SCNC: 135 MMOL/L (ref 135–144)
SODIUM,UR: 71 MMOL/L
SPECIFIC GRAVITY UA: 1.02 (ref 1.01–1.02)
TOTAL PROTEIN, URINE: 242 MG/DL
TSH SERPL-ACNC: 0.75 UIU/ML (ref 0.3–5)
TURBIDITY: CLEAR
URINE HGB: ABNORMAL
URINE TOTAL PROTEIN CREATININE RATIO: 2.24 (ref 0–0.2)
UROBILINOGEN, URINE: NORMAL
WBC # BLD AUTO: 9 K/UL (ref 3.5–11.3)
WBC UA: ABNORMAL /HPF (ref 0–5)
YEAST: ABNORMAL

## 2023-03-22 PROCEDURE — 81001 URINALYSIS AUTO W/SCOPE: CPT

## 2023-03-22 PROCEDURE — 94664 DEMO&/EVAL PT USE INHALER: CPT

## 2023-03-22 PROCEDURE — 94761 N-INVAS EAR/PLS OXIMETRY MLT: CPT

## 2023-03-22 PROCEDURE — 82570 ASSAY OF URINE CREATININE: CPT

## 2023-03-22 PROCEDURE — 6360000002 HC RX W HCPCS: Performed by: STUDENT IN AN ORGANIZED HEALTH CARE EDUCATION/TRAINING PROGRAM

## 2023-03-22 PROCEDURE — 82436 ASSAY OF URINE CHLORIDE: CPT

## 2023-03-22 PROCEDURE — 84156 ASSAY OF PROTEIN URINE: CPT

## 2023-03-22 PROCEDURE — 80048 BASIC METABOLIC PNL TOTAL CA: CPT

## 2023-03-22 PROCEDURE — 36415 COLL VENOUS BLD VENIPUNCTURE: CPT

## 2023-03-22 PROCEDURE — 94640 AIRWAY INHALATION TREATMENT: CPT

## 2023-03-22 PROCEDURE — 82805 BLOOD GASES W/O2 SATURATION: CPT

## 2023-03-22 PROCEDURE — 85027 COMPLETE CBC AUTOMATED: CPT

## 2023-03-22 PROCEDURE — 84300 ASSAY OF URINE SODIUM: CPT

## 2023-03-22 PROCEDURE — 82010 KETONE BODYS QUAN: CPT

## 2023-03-22 PROCEDURE — 84443 ASSAY THYROID STIM HORMONE: CPT

## 2023-03-22 PROCEDURE — 83735 ASSAY OF MAGNESIUM: CPT

## 2023-03-22 PROCEDURE — 82607 VITAMIN B-12: CPT

## 2023-03-22 PROCEDURE — 1200000000 HC SEMI PRIVATE

## 2023-03-22 PROCEDURE — 6370000000 HC RX 637 (ALT 250 FOR IP): Performed by: STUDENT IN AN ORGANIZED HEALTH CARE EDUCATION/TRAINING PROGRAM

## 2023-03-22 PROCEDURE — 84100 ASSAY OF PHOSPHORUS: CPT

## 2023-03-22 PROCEDURE — 93005 ELECTROCARDIOGRAM TRACING: CPT | Performed by: STUDENT IN AN ORGANIZED HEALTH CARE EDUCATION/TRAINING PROGRAM

## 2023-03-22 PROCEDURE — 76770 US EXAM ABDO BACK WALL COMP: CPT

## 2023-03-22 PROCEDURE — 82746 ASSAY OF FOLIC ACID SERUM: CPT

## 2023-03-22 PROCEDURE — 99285 EMERGENCY DEPT VISIT HI MDM: CPT

## 2023-03-22 PROCEDURE — 2580000003 HC RX 258: Performed by: STUDENT IN AN ORGANIZED HEALTH CARE EDUCATION/TRAINING PROGRAM

## 2023-03-22 PROCEDURE — 2500000003 HC RX 250 WO HCPCS: Performed by: STUDENT IN AN ORGANIZED HEALTH CARE EDUCATION/TRAINING PROGRAM

## 2023-03-22 PROCEDURE — 83605 ASSAY OF LACTIC ACID: CPT

## 2023-03-22 PROCEDURE — 82306 VITAMIN D 25 HYDROXY: CPT

## 2023-03-22 RX ORDER — BUMETANIDE 1 MG/1
2 TABLET ORAL 2 TIMES DAILY
Status: DISCONTINUED | OUTPATIENT
Start: 2023-03-22 | End: 2023-03-27

## 2023-03-22 RX ORDER — LOSARTAN POTASSIUM 50 MG/1
100 TABLET ORAL DAILY
Status: DISCONTINUED | OUTPATIENT
Start: 2023-03-23 | End: 2023-03-27

## 2023-03-22 RX ORDER — SODIUM CHLORIDE 0.9 % (FLUSH) 0.9 %
10 SYRINGE (ML) INJECTION EVERY 12 HOURS SCHEDULED
Status: DISCONTINUED | OUTPATIENT
Start: 2023-03-22 | End: 2023-03-27 | Stop reason: HOSPADM

## 2023-03-22 RX ORDER — ALBUTEROL SULFATE 2.5 MG/3ML
2.5 SOLUTION RESPIRATORY (INHALATION) EVERY 6 HOURS PRN
Status: DISCONTINUED | OUTPATIENT
Start: 2023-03-22 | End: 2023-03-22

## 2023-03-22 RX ORDER — POTASSIUM CHLORIDE 750 MG/1
10 TABLET, EXTENDED RELEASE ORAL DAILY
Status: DISCONTINUED | OUTPATIENT
Start: 2023-03-23 | End: 2023-03-27

## 2023-03-22 RX ORDER — METOCLOPRAMIDE 5 MG/1
5 TABLET ORAL 3 TIMES DAILY
Status: DISCONTINUED | OUTPATIENT
Start: 2023-03-22 | End: 2023-03-27 | Stop reason: HOSPADM

## 2023-03-22 RX ORDER — ATORVASTATIN CALCIUM 20 MG/1
20 TABLET, FILM COATED ORAL DAILY
Status: DISCONTINUED | OUTPATIENT
Start: 2023-03-23 | End: 2023-03-27 | Stop reason: HOSPADM

## 2023-03-22 RX ORDER — SPIRONOLACTONE 25 MG/1
50 TABLET ORAL DAILY
Status: DISCONTINUED | OUTPATIENT
Start: 2023-03-23 | End: 2023-03-27

## 2023-03-22 RX ORDER — ALBUTEROL SULFATE 90 UG/1
2 AEROSOL, METERED RESPIRATORY (INHALATION) EVERY 4 HOURS PRN
Status: DISCONTINUED | OUTPATIENT
Start: 2023-03-22 | End: 2023-03-27 | Stop reason: HOSPADM

## 2023-03-22 RX ORDER — ONDANSETRON 2 MG/ML
4 INJECTION INTRAMUSCULAR; INTRAVENOUS EVERY 6 HOURS PRN
Status: DISCONTINUED | OUTPATIENT
Start: 2023-03-22 | End: 2023-03-27 | Stop reason: HOSPADM

## 2023-03-22 RX ORDER — ACETAMINOPHEN 650 MG/1
650 SUPPOSITORY RECTAL EVERY 6 HOURS PRN
Status: DISCONTINUED | OUTPATIENT
Start: 2023-03-22 | End: 2023-03-27 | Stop reason: HOSPADM

## 2023-03-22 RX ORDER — TIZANIDINE 4 MG/1
4 TABLET ORAL PRN
Status: DISCONTINUED | OUTPATIENT
Start: 2023-03-22 | End: 2023-03-24

## 2023-03-22 RX ORDER — AMLODIPINE BESYLATE 10 MG/1
10 TABLET ORAL DAILY
Status: DISCONTINUED | OUTPATIENT
Start: 2023-03-22 | End: 2023-03-22

## 2023-03-22 RX ORDER — NABUMETONE 500 MG/1
500 TABLET, FILM COATED ORAL 2 TIMES DAILY
Status: DISCONTINUED | OUTPATIENT
Start: 2023-03-22 | End: 2023-03-22

## 2023-03-22 RX ORDER — ALBUTEROL SULFATE 2.5 MG/3ML
2.5 SOLUTION RESPIRATORY (INHALATION) 3 TIMES DAILY
Status: DISCONTINUED | OUTPATIENT
Start: 2023-03-23 | End: 2023-03-27 | Stop reason: HOSPADM

## 2023-03-22 RX ORDER — POLYETHYLENE GLYCOL 3350 17 G/17G
17 POWDER, FOR SOLUTION ORAL DAILY PRN
Status: DISCONTINUED | OUTPATIENT
Start: 2023-03-22 | End: 2023-03-27 | Stop reason: HOSPADM

## 2023-03-22 RX ORDER — OXYBUTYNIN CHLORIDE 5 MG/1
10 TABLET, EXTENDED RELEASE ORAL DAILY
Status: DISCONTINUED | OUTPATIENT
Start: 2023-03-23 | End: 2023-03-27 | Stop reason: HOSPADM

## 2023-03-22 RX ORDER — ACETAMINOPHEN 325 MG/1
650 TABLET ORAL EVERY 6 HOURS PRN
Status: DISCONTINUED | OUTPATIENT
Start: 2023-03-22 | End: 2023-03-27 | Stop reason: HOSPADM

## 2023-03-22 RX ORDER — FERROUS SULFATE 325(65) MG
325 TABLET ORAL
Status: DISCONTINUED | OUTPATIENT
Start: 2023-03-23 | End: 2023-03-27 | Stop reason: HOSPADM

## 2023-03-22 RX ORDER — DOCUSATE SODIUM 100 MG/1
100 CAPSULE, LIQUID FILLED ORAL 2 TIMES DAILY
Status: DISCONTINUED | OUTPATIENT
Start: 2023-03-22 | End: 2023-03-27 | Stop reason: HOSPADM

## 2023-03-22 RX ORDER — ALBUTEROL SULFATE 90 UG/1
2 AEROSOL, METERED RESPIRATORY (INHALATION) 4 TIMES DAILY PRN
Status: DISCONTINUED | OUTPATIENT
Start: 2023-03-22 | End: 2023-03-22

## 2023-03-22 RX ORDER — PANTOPRAZOLE SODIUM 40 MG/1
40 TABLET, DELAYED RELEASE ORAL DAILY
Status: DISCONTINUED | OUTPATIENT
Start: 2023-03-23 | End: 2023-03-27 | Stop reason: HOSPADM

## 2023-03-22 RX ORDER — AMLODIPINE BESYLATE 10 MG/1
10 TABLET ORAL DAILY
Status: DISCONTINUED | OUTPATIENT
Start: 2023-03-23 | End: 2023-03-27 | Stop reason: HOSPADM

## 2023-03-22 RX ORDER — GABAPENTIN 300 MG/1
300 CAPSULE ORAL NIGHTLY
Status: DISCONTINUED | OUTPATIENT
Start: 2023-03-22 | End: 2023-03-27 | Stop reason: HOSPADM

## 2023-03-22 RX ORDER — MONTELUKAST SODIUM 10 MG/1
10 TABLET ORAL NIGHTLY
Status: DISCONTINUED | OUTPATIENT
Start: 2023-03-22 | End: 2023-03-27 | Stop reason: HOSPADM

## 2023-03-22 RX ORDER — SODIUM CHLORIDE 9 MG/ML
INJECTION, SOLUTION INTRAVENOUS PRN
Status: DISCONTINUED | OUTPATIENT
Start: 2023-03-22 | End: 2023-03-27 | Stop reason: HOSPADM

## 2023-03-22 RX ORDER — SODIUM CHLORIDE 0.9 % (FLUSH) 0.9 %
10 SYRINGE (ML) INJECTION PRN
Status: DISCONTINUED | OUTPATIENT
Start: 2023-03-22 | End: 2023-03-27 | Stop reason: HOSPADM

## 2023-03-22 RX ORDER — HEPARIN SODIUM 5000 [USP'U]/ML
7500 INJECTION, SOLUTION INTRAVENOUS; SUBCUTANEOUS EVERY 8 HOURS SCHEDULED
Status: DISCONTINUED | OUTPATIENT
Start: 2023-03-22 | End: 2023-03-27 | Stop reason: HOSPADM

## 2023-03-22 RX ORDER — ONDANSETRON 4 MG/1
4 TABLET, ORALLY DISINTEGRATING ORAL EVERY 8 HOURS PRN
Status: DISCONTINUED | OUTPATIENT
Start: 2023-03-22 | End: 2023-03-27 | Stop reason: HOSPADM

## 2023-03-22 RX ORDER — GAUZE BANDAGE 2" X 2"
100 BANDAGE TOPICAL DAILY
Status: DISCONTINUED | OUTPATIENT
Start: 2023-03-23 | End: 2023-03-27 | Stop reason: HOSPADM

## 2023-03-22 RX ADMIN — ALBUTEROL SULFATE 2.5 MG: 2.5 SOLUTION RESPIRATORY (INHALATION) at 22:08

## 2023-03-22 RX ADMIN — TIZANIDINE 4 MG: 4 TABLET ORAL at 22:57

## 2023-03-22 RX ADMIN — CEFTRIAXONE 1000 MG: 1 INJECTION, POWDER, FOR SOLUTION INTRAMUSCULAR; INTRAVENOUS at 20:34

## 2023-03-22 RX ADMIN — ACETAMINOPHEN 650 MG: 325 TABLET ORAL at 22:57

## 2023-03-22 RX ADMIN — MONTELUKAST 10 MG: 10 TABLET, FILM COATED ORAL at 22:48

## 2023-03-22 RX ADMIN — SODIUM BICARBONATE: 84 INJECTION, SOLUTION INTRAVENOUS at 22:51

## 2023-03-22 RX ADMIN — SODIUM CHLORIDE, PRESERVATIVE FREE 10 ML: 5 INJECTION INTRAVENOUS at 22:48

## 2023-03-22 RX ADMIN — AMLODIPINE BESYLATE 10 MG: 5 TABLET ORAL at 19:43

## 2023-03-22 RX ADMIN — HEPARIN SODIUM 7500 UNITS: 5000 INJECTION INTRAVENOUS; SUBCUTANEOUS at 22:56

## 2023-03-22 ASSESSMENT — PAIN - FUNCTIONAL ASSESSMENT: PAIN_FUNCTIONAL_ASSESSMENT: ACTIVITIES ARE NOT PREVENTED

## 2023-03-22 ASSESSMENT — PAIN SCALES - GENERAL
PAINLEVEL_OUTOF10: 8
PAINLEVEL_OUTOF10: 2
PAINLEVEL_OUTOF10: 8
PAINLEVEL_OUTOF10: 2

## 2023-03-22 ASSESSMENT — PAIN DESCRIPTION - DESCRIPTORS
DESCRIPTORS: ACHING;THROBBING;STABBING
DESCRIPTORS: ACHING

## 2023-03-22 ASSESSMENT — PAIN DESCRIPTION - LOCATION
LOCATION: BACK
LOCATION: BACK;LEG

## 2023-03-22 ASSESSMENT — PAIN DESCRIPTION - ORIENTATION: ORIENTATION: LEFT;RIGHT;LOWER

## 2023-03-22 ASSESSMENT — PAIN DESCRIPTION - FREQUENCY: FREQUENCY: CONTINUOUS

## 2023-03-22 ASSESSMENT — PAIN DESCRIPTION - PAIN TYPE: TYPE: ACUTE PAIN

## 2023-03-22 ASSESSMENT — PAIN DESCRIPTION - ONSET: ONSET: ON-GOING

## 2023-03-22 NOTE — TELEPHONE ENCOUNTER
Spoke to pt, she understands she needs to go to the ED and why. She is going to [facility].

## 2023-03-22 NOTE — TELEPHONE ENCOUNTER
Verbal order per Dr. Mckee Cielo: CBC, BMP, UA, Urine chloride, urine sodium, us kidney. Schedule pt 3/24/23 at 12 pm.     Spoke to pt, she stated that she understood to get labs done and come to the [facility] office on Friday at noon.

## 2023-03-22 NOTE — TELEPHONE ENCOUNTER
----- Message from Deon Kirk MD sent at 3/22/2023  3:31 PM EDT -----  She needs to go to ED. Her bicarbonate is dropping. She is acidotic and will likely need bicarbonate etc...

## 2023-03-22 NOTE — TELEPHONE ENCOUNTER
US Renal is scheduled for today at 1:30 pm at [facility]. Pt is aware and will get labs done then.

## 2023-03-22 NOTE — RESULT ENCOUNTER NOTE
She needs to go to ED. Her bicarbonate is dropping. She is acidotic and will likely need bicarbonate etc.. Meliton Pringle

## 2023-03-22 NOTE — TELEPHONE ENCOUNTER
----- Message from Brandt Bangura MD sent at 3/21/2023  7:21 PM EDT -----  Need STAT BMP on Wednesday March 22    ----- Message -----  From: Automatic Discharge Provider  Sent: 3/19/2023   9:27 PM EDT  To: Brandt Bangura MD

## 2023-03-22 NOTE — ED NOTES
Pt states she did not take her morning dose of bp medications.  Provider notified and orders being placed     Chucky Silvestre RN  03/22/23 9710

## 2023-03-22 NOTE — ED PROVIDER NOTES
with Dr. La Kruse she was establishing care for the first time with a nephrologist  Back strain this patient is that we go she was here in emergency department diagnosed with superimposed iritis on top of chronic lymphedema of her lower extremities started on Bactrim  Her creatinine was getting worse and so she is given referral to nephrology  Today was the appointment where she establish care  She was told by Dr. La Kruse in his note substantiates that she was told to come to emergency room because her bicarb levels are low and that she may need to be admitted  Patient is hypertensive 200/100 although she has no chest pain abdominal pain headache no signs of endorgan damage except for known and worsening creatinine function  I did draw additional labs to look for cause of metabolic acidosis I obtained a blood gas shows pH 7.3, PCO2 22, bicarb 18  This is a compensating metabolic acidosis  Lactic acid not elevated  Beta-hydroxybutyrate not elevated she is not diabetic  She does take 1 pill of iron daily but I do not suspect iron overdose  Patient did miss her morning dose of amlodipine so I redosed it here in the emergency department  Ultimately I do believe patient is to stay in hospital for worsening renal function acute renal failure on top of known kidney disease and recurrent and worsening lymphedema with superimposed infection  I spoke with hospitalist who agreed admit the patient  I spoke with the nephrology group with Dr. La Kruse and spoke with physician on-call who states that they will agree to do telehealth and that if the patient does ultimately require dialysis this will require her to be transferred the patient is agreeable and amenable to this plan stay in hospital and will continue to check her labs  Nephrologist also agreed and asked that we started bicarb drip which was initiated here hospitalist asked that we start Rocephin 1 g IV which I gave her in the emergency

## 2023-03-23 ENCOUNTER — APPOINTMENT (OUTPATIENT)
Dept: NON INVASIVE DIAGNOSTICS | Age: 53
DRG: 469 | End: 2023-03-23
Payer: MEDICAID

## 2023-03-23 PROBLEM — S71.109A WOUND OF THIGH: Status: ACTIVE | Noted: 2023-03-23

## 2023-03-23 LAB
25(OH)D3 SERPL-MCNC: 32.3 NG/ML
ABSOLUTE EOS #: 0 K/UL (ref 0–0.44)
ABSOLUTE IMMATURE GRANULOCYTE: 0 K/UL (ref 0–0.3)
ABSOLUTE LYMPH #: 2.25 K/UL (ref 1.1–3.7)
ABSOLUTE MONO #: 0.75 K/UL (ref 0.1–1.2)
ALBUMIN SERPL-MCNC: 3.6 G/DL (ref 3.5–5.2)
ALBUMIN/GLOBULIN RATIO: 1.1 (ref 1–2.5)
ALP SERPL-CCNC: 76 U/L (ref 35–104)
ALT SERPL-CCNC: 8 U/L (ref 5–33)
ANION GAP SERPL CALCULATED.3IONS-SCNC: 10 MMOL/L (ref 9–17)
AST SERPL-CCNC: 11 U/L
BASOPHILS # BLD: 0 % (ref 0–2)
BASOPHILS ABSOLUTE: 0 K/UL (ref 0–0.2)
BILIRUB SERPL-MCNC: 0.3 MG/DL (ref 0.3–1.2)
BUN SERPL-MCNC: 24 MG/DL (ref 6–20)
BUN/CREAT BLD: 5 (ref 9–20)
CALCIUM SERPL-MCNC: 9.1 MG/DL (ref 8.6–10.4)
CHLORIDE SERPL-SCNC: 108 MMOL/L (ref 98–107)
CO2 SERPL-SCNC: 18 MMOL/L (ref 20–31)
COMPLEMENT C3: 170 MG/DL (ref 90–180)
COMPLEMENT C4: 43 MG/DL (ref 10–40)
CREAT SERPL-MCNC: 4.44 MG/DL (ref 0.5–0.9)
CRP SERPL HS-MCNC: 9.9 MG/L (ref 0–5)
EKG ATRIAL RATE: 79 BPM
EKG P AXIS: 64 DEGREES
EKG P-R INTERVAL: 172 MS
EKG Q-T INTERVAL: 386 MS
EKG QRS DURATION: 90 MS
EKG QTC CALCULATION (BAZETT): 442 MS
EKG R AXIS: -5 DEGREES
EKG T AXIS: 38 DEGREES
EKG VENTRICULAR RATE: 79 BPM
EOSINOPHILS RELATIVE PERCENT: 0 % (ref 1–4)
GFR SERPL CREATININE-BSD FRML MDRD: 11 ML/MIN/1.73M2
GLUCOSE SERPL-MCNC: 91 MG/DL (ref 70–99)
HBV SURFACE AG SER QL: NONREACTIVE
HCT VFR BLD AUTO: 30 % (ref 36.3–47.1)
HCV AB SER QL: NONREACTIVE
HGB BLD-MCNC: 9.6 G/DL (ref 11.9–15.1)
IMMATURE GRANULOCYTES: 0 %
LV EF: 65 %
LVEF MODALITY: NORMAL
LYMPHOCYTES # BLD: 30 % (ref 24–43)
MCH RBC QN AUTO: 30.1 PG (ref 25.2–33.5)
MCHC RBC AUTO-ENTMCNC: 32 G/DL (ref 28.4–34.8)
MCV RBC AUTO: 94 FL (ref 82.6–102.9)
MONOCYTES # BLD: 10 % (ref 3–12)
MORPHOLOGY: NORMAL
NRBC AUTOMATED: 0 PER 100 WBC
PDW BLD-RTO: 14 % (ref 11.8–14.4)
PHOSPHATE SERPL-MCNC: 3.2 MG/DL (ref 2.6–4.5)
PLATELET # BLD AUTO: 247 K/UL (ref 138–453)
PMV BLD AUTO: 10.1 FL (ref 8.1–13.5)
POTASSIUM SERPL-SCNC: 4.6 MMOL/L (ref 3.7–5.3)
PROT SERPL-MCNC: 6.9 G/DL (ref 6.4–8.3)
PTH-INTACT SERPL-MCNC: 261.6 PG/ML (ref 14–72)
RBC # BLD: 3.19 M/UL (ref 3.95–5.11)
SEG NEUTROPHILS: 60 % (ref 36–65)
SEGMENTED NEUTROPHILS ABSOLUTE COUNT: 4.5 K/UL (ref 1.5–8.1)
SODIUM SERPL-SCNC: 136 MMOL/L (ref 135–144)
WBC # BLD AUTO: 7.5 K/UL (ref 3.5–11.3)

## 2023-03-23 PROCEDURE — 85025 COMPLETE CBC W/AUTO DIFF WBC: CPT

## 2023-03-23 PROCEDURE — 97535 SELF CARE MNGMENT TRAINING: CPT

## 2023-03-23 PROCEDURE — 2580000003 HC RX 258: Performed by: INTERNAL MEDICINE

## 2023-03-23 PROCEDURE — 86140 C-REACTIVE PROTEIN: CPT

## 2023-03-23 PROCEDURE — 2580000003 HC RX 258: Performed by: STUDENT IN AN ORGANIZED HEALTH CARE EDUCATION/TRAINING PROGRAM

## 2023-03-23 PROCEDURE — 93010 ELECTROCARDIOGRAM REPORT: CPT | Performed by: INTERNAL MEDICINE

## 2023-03-23 PROCEDURE — 84100 ASSAY OF PHOSPHORUS: CPT

## 2023-03-23 PROCEDURE — 86225 DNA ANTIBODY NATIVE: CPT

## 2023-03-23 PROCEDURE — 93306 TTE W/DOPPLER COMPLETE: CPT

## 2023-03-23 PROCEDURE — 6370000000 HC RX 637 (ALT 250 FOR IP): Performed by: NURSE PRACTITIONER

## 2023-03-23 PROCEDURE — 99222 1ST HOSP IP/OBS MODERATE 55: CPT | Performed by: INTERNAL MEDICINE

## 2023-03-23 PROCEDURE — 86803 HEPATITIS C AB TEST: CPT

## 2023-03-23 PROCEDURE — 86235 NUCLEAR ANTIGEN ANTIBODY: CPT

## 2023-03-23 PROCEDURE — 1200000000 HC SEMI PRIVATE

## 2023-03-23 PROCEDURE — 6370000000 HC RX 637 (ALT 250 FOR IP): Performed by: STUDENT IN AN ORGANIZED HEALTH CARE EDUCATION/TRAINING PROGRAM

## 2023-03-23 PROCEDURE — 83516 IMMUNOASSAY NONANTIBODY: CPT

## 2023-03-23 PROCEDURE — 36415 COLL VENOUS BLD VENIPUNCTURE: CPT

## 2023-03-23 PROCEDURE — 83970 ASSAY OF PARATHORMONE: CPT

## 2023-03-23 PROCEDURE — 94640 AIRWAY INHALATION TREATMENT: CPT

## 2023-03-23 PROCEDURE — 97116 GAIT TRAINING THERAPY: CPT

## 2023-03-23 PROCEDURE — 80053 COMPREHEN METABOLIC PANEL: CPT

## 2023-03-23 PROCEDURE — 97110 THERAPEUTIC EXERCISES: CPT

## 2023-03-23 PROCEDURE — 86160 COMPLEMENT ANTIGEN: CPT

## 2023-03-23 PROCEDURE — 84155 ASSAY OF PROTEIN SERUM: CPT

## 2023-03-23 PROCEDURE — 94761 N-INVAS EAR/PLS OXIMETRY MLT: CPT

## 2023-03-23 PROCEDURE — 97166 OT EVAL MOD COMPLEX 45 MIN: CPT

## 2023-03-23 PROCEDURE — 6360000002 HC RX W HCPCS: Performed by: STUDENT IN AN ORGANIZED HEALTH CARE EDUCATION/TRAINING PROGRAM

## 2023-03-23 PROCEDURE — 94664 DEMO&/EVAL PT USE INHALER: CPT

## 2023-03-23 PROCEDURE — 97162 PT EVAL MOD COMPLEX 30 MIN: CPT

## 2023-03-23 PROCEDURE — 2500000003 HC RX 250 WO HCPCS: Performed by: INTERNAL MEDICINE

## 2023-03-23 PROCEDURE — 87340 HEPATITIS B SURFACE AG IA: CPT

## 2023-03-23 PROCEDURE — 86038 ANTINUCLEAR ANTIBODIES: CPT

## 2023-03-23 PROCEDURE — 84165 PROTEIN E-PHORESIS SERUM: CPT

## 2023-03-23 RX ORDER — FLUTICASONE PROPIONATE 50 MCG
1 SPRAY, SUSPENSION (ML) NASAL DAILY
Status: DISCONTINUED | OUTPATIENT
Start: 2023-03-23 | End: 2023-03-27 | Stop reason: HOSPADM

## 2023-03-23 RX ORDER — BENZONATATE 100 MG/1
100 CAPSULE ORAL 3 TIMES DAILY PRN
Status: DISCONTINUED | OUTPATIENT
Start: 2023-03-23 | End: 2023-03-27 | Stop reason: HOSPADM

## 2023-03-23 RX ADMIN — FERROUS SULFATE TAB 325 MG (65 MG ELEMENTAL FE) 325 MG: 325 (65 FE) TAB at 08:15

## 2023-03-23 RX ADMIN — ALBUTEROL SULFATE 2.5 MG: 2.5 SOLUTION RESPIRATORY (INHALATION) at 16:19

## 2023-03-23 RX ADMIN — ONDANSETRON 4 MG: 2 INJECTION INTRAMUSCULAR; INTRAVENOUS at 11:18

## 2023-03-23 RX ADMIN — DOCUSATE SODIUM 100 MG: 100 CAPSULE, LIQUID FILLED ORAL at 21:43

## 2023-03-23 RX ADMIN — HEPARIN SODIUM 7500 UNITS: 5000 INJECTION INTRAVENOUS; SUBCUTANEOUS at 15:05

## 2023-03-23 RX ADMIN — ACETAMINOPHEN 650 MG: 325 TABLET ORAL at 07:20

## 2023-03-23 RX ADMIN — ACETAMINOPHEN 650 MG: 325 TABLET ORAL at 18:57

## 2023-03-23 RX ADMIN — ALBUTEROL SULFATE 2.5 MG: 2.5 SOLUTION RESPIRATORY (INHALATION) at 10:47

## 2023-03-23 RX ADMIN — MOMETASONE FUROATE AND FORMOTEROL FUMARATE DIHYDRATE 2 PUFF: 200; 5 AEROSOL RESPIRATORY (INHALATION) at 10:58

## 2023-03-23 RX ADMIN — HEPARIN SODIUM 7500 UNITS: 5000 INJECTION INTRAVENOUS; SUBCUTANEOUS at 07:20

## 2023-03-23 RX ADMIN — TIZANIDINE 4 MG: 4 TABLET ORAL at 08:15

## 2023-03-23 RX ADMIN — CEFTRIAXONE 1000 MG: 1 INJECTION, POWDER, FOR SOLUTION INTRAMUSCULAR; INTRAVENOUS at 21:43

## 2023-03-23 RX ADMIN — GUAIFENESIN AND DEXTROMETHORPHAN HYDROBROMIDE 1 TABLET: 600; 30 TABLET, EXTENDED RELEASE ORAL at 08:15

## 2023-03-23 RX ADMIN — SODIUM BICARBONATE: 84 INJECTION, SOLUTION INTRAVENOUS at 16:14

## 2023-03-23 RX ADMIN — MONTELUKAST 10 MG: 10 TABLET, FILM COATED ORAL at 21:43

## 2023-03-23 RX ADMIN — THIAMINE HCL TAB 100 MG 100 MG: 100 TAB at 08:16

## 2023-03-23 RX ADMIN — HEPARIN SODIUM 7500 UNITS: 5000 INJECTION INTRAVENOUS; SUBCUTANEOUS at 21:43

## 2023-03-23 RX ADMIN — MOMETASONE FUROATE AND FORMOTEROL FUMARATE DIHYDRATE 2 PUFF: 200; 5 AEROSOL RESPIRATORY (INHALATION) at 20:38

## 2023-03-23 RX ADMIN — ALBUTEROL SULFATE 2.5 MG: 2.5 SOLUTION RESPIRATORY (INHALATION) at 20:26

## 2023-03-23 RX ADMIN — BENZONATATE 100 MG: 100 CAPSULE ORAL at 15:05

## 2023-03-23 RX ADMIN — PANTOPRAZOLE SODIUM 40 MG: 40 TABLET, DELAYED RELEASE ORAL at 08:14

## 2023-03-23 RX ADMIN — GUAIFENESIN AND DEXTROMETHORPHAN HYDROBROMIDE 1 TABLET: 600; 30 TABLET, EXTENDED RELEASE ORAL at 21:43

## 2023-03-23 RX ADMIN — TIZANIDINE 4 MG: 4 TABLET ORAL at 23:58

## 2023-03-23 RX ADMIN — ATORVASTATIN CALCIUM 20 MG: 20 TABLET, FILM COATED ORAL at 08:14

## 2023-03-23 RX ADMIN — FLUTICASONE PROPIONATE 1 SPRAY: 50 SPRAY, METERED NASAL at 08:24

## 2023-03-23 RX ADMIN — AMLODIPINE BESYLATE 10 MG: 10 TABLET ORAL at 08:15

## 2023-03-23 RX ADMIN — DOCUSATE SODIUM 100 MG: 100 CAPSULE, LIQUID FILLED ORAL at 08:15

## 2023-03-23 ASSESSMENT — PAIN SCALES - GENERAL
PAINLEVEL_OUTOF10: 5
PAINLEVEL_OUTOF10: 8
PAINLEVEL_OUTOF10: 6

## 2023-03-23 ASSESSMENT — PAIN DESCRIPTION - DESCRIPTORS
DESCRIPTORS: ACHING
DESCRIPTORS: DULL;ACHING

## 2023-03-23 ASSESSMENT — PAIN DESCRIPTION - LOCATION
LOCATION: LEG
LOCATION: BACK;LEG
LOCATION: BACK;LEG
LOCATION: GENERALIZED
LOCATION: GENERALIZED

## 2023-03-23 ASSESSMENT — PAIN DESCRIPTION - ORIENTATION
ORIENTATION: LEFT;RIGHT;LOWER
ORIENTATION: RIGHT;LEFT
ORIENTATION: RIGHT;LEFT

## 2023-03-23 ASSESSMENT — PAIN DESCRIPTION - ONSET: ONSET: ON-GOING

## 2023-03-23 ASSESSMENT — PAIN - FUNCTIONAL ASSESSMENT
PAIN_FUNCTIONAL_ASSESSMENT: ACTIVITIES ARE NOT PREVENTED
PAIN_FUNCTIONAL_ASSESSMENT: ACTIVITIES ARE NOT PREVENTED

## 2023-03-23 ASSESSMENT — PAIN DESCRIPTION - PAIN TYPE: TYPE: CHRONIC PAIN

## 2023-03-23 ASSESSMENT — PAIN DESCRIPTION - FREQUENCY: FREQUENCY: CONTINUOUS

## 2023-03-23 NOTE — CARE COORDINATION
Case Management Assessment  Initial Evaluation    Date/Time of Evaluation: 3/23/2023 9:29 AM  Assessment Completed by: KELLEN Lyles    If patient is discharged prior to next notation, then this note serves as note for discharge by case management. Patient Name: Greta Russell                   YOB: 1970  Diagnosis: JOSÉ (acute kidney injury) ([facility] [I38.9]  Metabolic acidosis with increased anion gap and accumulation of organic acids [E87.20]  Acute renal failure, unspecified acute renal failure type ([facility] [N17.9]                   Date / Time: 3/22/2023  5:37 PM    Patient Admission Status: Inpatient   Readmission Risk (Low < 19, Mod (19-27), High > 27): Readmission Risk Score: 16.3    Current PCP: ABRAM Keys CNP  PCP verified by CM? Yes    Chart Reviewed: Yes      History Provided by: Patient  Patient Orientation: Alert and Oriented, Person, Place, Situation, Self    Patient Cognition: Alert    Hospitalization in the last 30 days (Readmission):  No    If yes, Readmission Assessment in CM Navigator will be completed. Advance Directives:      Code Status: Full Code   Patient's Primary Decision Maker is: Named in [address] [place] [address]    Primary Decision Maker: Kyle Morales - [facility] - [phone]    Discharge Planning:    Patient lives with: Alone Type of Home: Apartment  Primary Care Giver: Self  Patient Support Systems include: [employer], Friends/Neighbors   Current Financial resources: Medicaid  Current community resources: None  Current services prior to admission: Durable Medical Equipment            Current DME: Cane            Type of Home Care services:  Nursing Services    ADLS  Prior functional level: Independent in ADLs/IADLs  Current functional level:  Independent in ADLs/IADLs    PT AM-PAC:   /24  OT AM-PAC: 23 /24    Family can provide assistance at DC: No  Would you like Case Management to discuss the discharge plan with any other family

## 2023-03-23 NOTE — H&P
BACTERIA 1+ (A) 06/17/2022    YEAST PRESENCE NOTED (A) 03/22/2023       Lactic Acid:   Lab Results   Component Value Date    LACTA 2.1 03/22/2023       D-Dimer:  Lab Results   Component Value Date    DDIMER 0.77 (H) 01/12/2014       PT/INR:  Lab Results   Component Value Date/Time    PROTIME 10.6 09/22/2016 11:03 PM    PROTIME 11.4 09/19/2016 09:01 AM    INR 1.0 09/22/2016 11:03 PM       High Sensitivity Troponin:  No results for input(s): TROPHS in the last 72 hours. ABGs:   Lab Results   Component Value Date/Time    FIO2 UNKNOWN 09/22/2016 11:03 PM           No orders to display             Assessment:    Principal Problem:    JOSÉ (acute kidney injury) ([address]  Active Problems:    Lymphedema    HTN, goal below 140/90    Morbid obesity with BMI of 60.0-69.9, adult ([address]  Resolved Problems:    * No resolved hospital problems.  *      Patient Active Problem List    Diagnosis Date Noted    Pain in joint, lower leg     Myalgia and myositis     Anemia 11/30/2022    OAB (overactive bladder) 11/22/2022    Lymphedema 03/20/2014    JOSÉ (acute kidney injury) ([address] 03/22/2023    Iron malabsorption 01/30/2019    Hyperparathyroidism ([address] 03/08/2018    TBI (traumatic brain injury) 01/09/2018    Lumbar stenosis 09/26/2017    Headaches due to old head injury 04/25/2017    Hearing loss 04/25/2017    Prediabetes 03/17/2017    Subluxation of tendon of long head of biceps     Morbid obesity with BMI of 60.0-69.9, adult ([address] 11/02/2016    LUQ abdominal pain 11/02/2016    S/P laparoscopic sleeve gastrectomy 11/02/2016    Shoulder fracture, right     Closed fracture of acromial process of right scapula 09/23/2016    Closed dislocation of glenohumeral joint 09/23/2016    Chronic cholecystitis     Ganglion cyst     Thiamine deficiency 01/12/2016    Vitamin D deficiency 01/11/2016    Chronic low back pain 01/11/2016    Cholelithiasis 01/11/2016    Arthritis 01/11/2016    Hyperlipidemia     DAVIDSON on CPAP 05/07/2015    Asthma, chronic

## 2023-03-24 LAB
ABSOLUTE EOS #: 0.17 K/UL (ref 0–0.44)
ABSOLUTE IMMATURE GRANULOCYTE: <0.03 K/UL (ref 0–0.3)
ABSOLUTE LYMPH #: 2.55 K/UL (ref 1.1–3.7)
ABSOLUTE MONO #: 0.72 K/UL (ref 0.1–1.2)
ALBUMIN SERPL-MCNC: 3.7 G/DL (ref 3.5–5.2)
ALBUMIN/GLOBULIN RATIO: 0.9 (ref 1–2.5)
ALP SERPL-CCNC: 80 U/L (ref 35–104)
ALT SERPL-CCNC: 6 U/L (ref 5–33)
ANA SER QL IA: POSITIVE
ANCA MYELOPEROXIDASE: 0.3 AU/ML (ref 0–3.5)
ANCA PROTEINASE 3: <0.7 AU/ML (ref 0–2)
ANION GAP SERPL CALCULATED.3IONS-SCNC: 15 MMOL/L (ref 9–17)
AST SERPL-CCNC: 15 U/L
BASOPHILS # BLD: 1 % (ref 0–2)
BASOPHILS ABSOLUTE: 0.06 K/UL (ref 0–0.2)
BILIRUB SERPL-MCNC: 0.3 MG/DL (ref 0.3–1.2)
BUN SERPL-MCNC: 25 MG/DL (ref 6–20)
BUN/CREAT BLD: 6 (ref 9–20)
CALCIUM SERPL-MCNC: 8.9 MG/DL (ref 8.6–10.4)
CHLORIDE SERPL-SCNC: 104 MMOL/L (ref 98–107)
CO2 SERPL-SCNC: 19 MMOL/L (ref 20–31)
CREAT SERPL-MCNC: 4.35 MG/DL (ref 0.5–0.9)
CRP SERPL HS-MCNC: 16.2 MG/L (ref 0–5)
DSDNA IGG SER QL IA: 1 IU/ML
EOSINOPHILS RELATIVE PERCENT: 2 % (ref 1–4)
GBM ANTIBODY IGG: <2 AU/ML (ref 0–7)
GFR SERPL CREATININE-BSD FRML MDRD: 12 ML/MIN/1.73M2
GLUCOSE SERPL-MCNC: 81 MG/DL (ref 70–99)
HCT VFR BLD AUTO: 33.3 % (ref 36.3–47.1)
HGB BLD-MCNC: 10.5 G/DL (ref 11.9–15.1)
IMMATURE GRANULOCYTES: 0 %
LYMPHOCYTES # BLD: 37 % (ref 24–43)
MCH RBC QN AUTO: 29.9 PG (ref 25.2–33.5)
MCHC RBC AUTO-ENTMCNC: 31.5 G/DL (ref 28.4–34.8)
MCV RBC AUTO: 94.9 FL (ref 82.6–102.9)
MONOCYTES # BLD: 10 % (ref 3–12)
NRBC AUTOMATED: 0 PER 100 WBC
NUCLEAR IGG SER IA-RTO: 29 U/ML
PDW BLD-RTO: 14.2 % (ref 11.8–14.4)
PLATELET # BLD AUTO: 269 K/UL (ref 138–453)
PMV BLD AUTO: 9.9 FL (ref 8.1–13.5)
POTASSIUM SERPL-SCNC: 4.8 MMOL/L (ref 3.7–5.3)
PROT SERPL-MCNC: 7.7 G/DL (ref 6.4–8.3)
RBC # BLD: 3.51 M/UL (ref 3.95–5.11)
SEG NEUTROPHILS: 50 % (ref 36–65)
SEGMENTED NEUTROPHILS ABSOLUTE COUNT: 3.47 K/UL (ref 1.5–8.1)
SODIUM SERPL-SCNC: 138 MMOL/L (ref 135–144)
WBC # BLD AUTO: 7 K/UL (ref 3.5–11.3)

## 2023-03-24 PROCEDURE — 86140 C-REACTIVE PROTEIN: CPT

## 2023-03-24 PROCEDURE — 6360000002 HC RX W HCPCS: Performed by: STUDENT IN AN ORGANIZED HEALTH CARE EDUCATION/TRAINING PROGRAM

## 2023-03-24 PROCEDURE — 2500000003 HC RX 250 WO HCPCS: Performed by: NURSE PRACTITIONER

## 2023-03-24 PROCEDURE — 94664 DEMO&/EVAL PT USE INHALER: CPT

## 2023-03-24 PROCEDURE — 6370000000 HC RX 637 (ALT 250 FOR IP): Performed by: NURSE PRACTITIONER

## 2023-03-24 PROCEDURE — 97110 THERAPEUTIC EXERCISES: CPT

## 2023-03-24 PROCEDURE — 2500000003 HC RX 250 WO HCPCS: Performed by: INTERNAL MEDICINE

## 2023-03-24 PROCEDURE — 85025 COMPLETE CBC W/AUTO DIFF WBC: CPT

## 2023-03-24 PROCEDURE — 1200000000 HC SEMI PRIVATE

## 2023-03-24 PROCEDURE — 97535 SELF CARE MNGMENT TRAINING: CPT

## 2023-03-24 PROCEDURE — 2580000003 HC RX 258: Performed by: INTERNAL MEDICINE

## 2023-03-24 PROCEDURE — 99233 SBSQ HOSP IP/OBS HIGH 50: CPT | Performed by: INTERNAL MEDICINE

## 2023-03-24 PROCEDURE — 6370000000 HC RX 637 (ALT 250 FOR IP): Performed by: STUDENT IN AN ORGANIZED HEALTH CARE EDUCATION/TRAINING PROGRAM

## 2023-03-24 PROCEDURE — 97116 GAIT TRAINING THERAPY: CPT

## 2023-03-24 PROCEDURE — 80053 COMPREHEN METABOLIC PANEL: CPT

## 2023-03-24 PROCEDURE — 94761 N-INVAS EAR/PLS OXIMETRY MLT: CPT

## 2023-03-24 PROCEDURE — 36415 COLL VENOUS BLD VENIPUNCTURE: CPT

## 2023-03-24 PROCEDURE — 94640 AIRWAY INHALATION TREATMENT: CPT

## 2023-03-24 RX ORDER — LABETALOL HYDROCHLORIDE 5 MG/ML
10 INJECTION, SOLUTION INTRAVENOUS ONCE
Status: COMPLETED | OUTPATIENT
Start: 2023-03-24 | End: 2023-03-24

## 2023-03-24 RX ORDER — TIZANIDINE 4 MG/1
4 TABLET ORAL EVERY 8 HOURS PRN
Status: DISCONTINUED | OUTPATIENT
Start: 2023-03-24 | End: 2023-03-27 | Stop reason: HOSPADM

## 2023-03-24 RX ORDER — HYDRALAZINE HYDROCHLORIDE 25 MG/1
25 TABLET, FILM COATED ORAL EVERY 8 HOURS SCHEDULED
Status: DISCONTINUED | OUTPATIENT
Start: 2023-03-24 | End: 2023-03-25

## 2023-03-24 RX ADMIN — HYDRALAZINE HYDROCHLORIDE 25 MG: 25 TABLET, FILM COATED ORAL at 21:19

## 2023-03-24 RX ADMIN — ONDANSETRON 4 MG: 2 INJECTION INTRAMUSCULAR; INTRAVENOUS at 22:22

## 2023-03-24 RX ADMIN — PANTOPRAZOLE SODIUM 40 MG: 40 TABLET, DELAYED RELEASE ORAL at 08:45

## 2023-03-24 RX ADMIN — HYDRALAZINE HYDROCHLORIDE 25 MG: 25 TABLET, FILM COATED ORAL at 13:42

## 2023-03-24 RX ADMIN — FERROUS SULFATE TAB 325 MG (65 MG ELEMENTAL FE) 325 MG: 325 (65 FE) TAB at 08:45

## 2023-03-24 RX ADMIN — DOCUSATE SODIUM 100 MG: 100 CAPSULE, LIQUID FILLED ORAL at 21:19

## 2023-03-24 RX ADMIN — HEPARIN SODIUM 7500 UNITS: 5000 INJECTION INTRAVENOUS; SUBCUTANEOUS at 13:42

## 2023-03-24 RX ADMIN — FLUTICASONE PROPIONATE 1 SPRAY: 50 SPRAY, METERED NASAL at 08:47

## 2023-03-24 RX ADMIN — ALBUTEROL SULFATE 2.5 MG: 2.5 SOLUTION RESPIRATORY (INHALATION) at 10:13

## 2023-03-24 RX ADMIN — GUAIFENESIN AND DEXTROMETHORPHAN HYDROBROMIDE 1 TABLET: 600; 30 TABLET, EXTENDED RELEASE ORAL at 08:45

## 2023-03-24 RX ADMIN — ALBUTEROL SULFATE 2.5 MG: 2.5 SOLUTION RESPIRATORY (INHALATION) at 14:11

## 2023-03-24 RX ADMIN — LABETALOL HYDROCHLORIDE 10 MG: 5 INJECTION INTRAVENOUS at 16:43

## 2023-03-24 RX ADMIN — HEPARIN SODIUM 7500 UNITS: 5000 INJECTION INTRAVENOUS; SUBCUTANEOUS at 21:20

## 2023-03-24 RX ADMIN — GUAIFENESIN AND DEXTROMETHORPHAN HYDROBROMIDE 1 TABLET: 600; 30 TABLET, EXTENDED RELEASE ORAL at 21:19

## 2023-03-24 RX ADMIN — SODIUM BICARBONATE: 84 INJECTION, SOLUTION INTRAVENOUS at 03:09

## 2023-03-24 RX ADMIN — MOMETASONE FUROATE AND FORMOTEROL FUMARATE DIHYDRATE 2 PUFF: 200; 5 AEROSOL RESPIRATORY (INHALATION) at 10:13

## 2023-03-24 RX ADMIN — ACETAMINOPHEN 650 MG: 325 TABLET ORAL at 05:15

## 2023-03-24 RX ADMIN — SODIUM BICARBONATE: 84 INJECTION, SOLUTION INTRAVENOUS at 14:36

## 2023-03-24 RX ADMIN — AMLODIPINE BESYLATE 10 MG: 10 TABLET ORAL at 08:45

## 2023-03-24 RX ADMIN — ALBUTEROL SULFATE 2.5 MG: 2.5 SOLUTION RESPIRATORY (INHALATION) at 19:00

## 2023-03-24 RX ADMIN — HEPARIN SODIUM 7500 UNITS: 5000 INJECTION INTRAVENOUS; SUBCUTANEOUS at 05:15

## 2023-03-24 RX ADMIN — DOCUSATE SODIUM 100 MG: 100 CAPSULE, LIQUID FILLED ORAL at 08:45

## 2023-03-24 RX ADMIN — ATORVASTATIN CALCIUM 20 MG: 20 TABLET, FILM COATED ORAL at 08:45

## 2023-03-24 RX ADMIN — MOMETASONE FUROATE AND FORMOTEROL FUMARATE DIHYDRATE 2 PUFF: 200; 5 AEROSOL RESPIRATORY (INHALATION) at 19:00

## 2023-03-24 RX ADMIN — THIAMINE HCL TAB 100 MG 100 MG: 100 TAB at 08:45

## 2023-03-24 RX ADMIN — MONTELUKAST 10 MG: 10 TABLET, FILM COATED ORAL at 21:19

## 2023-03-24 ASSESSMENT — PAIN DESCRIPTION - ORIENTATION
ORIENTATION: MID

## 2023-03-24 ASSESSMENT — PAIN DESCRIPTION - PAIN TYPE: TYPE: ACUTE PAIN

## 2023-03-24 ASSESSMENT — PAIN SCALES - GENERAL
PAINLEVEL_OUTOF10: 0
PAINLEVEL_OUTOF10: 7
PAINLEVEL_OUTOF10: 8
PAINLEVEL_OUTOF10: 6

## 2023-03-24 ASSESSMENT — PAIN - FUNCTIONAL ASSESSMENT: PAIN_FUNCTIONAL_ASSESSMENT: ACTIVITIES ARE NOT PREVENTED

## 2023-03-24 ASSESSMENT — PAIN DESCRIPTION - LOCATION
LOCATION: HEAD;LEG
LOCATION: HEAD
LOCATION: HEAD

## 2023-03-24 ASSESSMENT — PAIN DESCRIPTION - DESCRIPTORS
DESCRIPTORS: ACHING
DESCRIPTORS: SHARP;THROBBING
DESCRIPTORS: ACHING

## 2023-03-24 ASSESSMENT — PAIN DESCRIPTION - ONSET: ONSET: ON-GOING

## 2023-03-24 ASSESSMENT — PAIN DESCRIPTION - FREQUENCY: FREQUENCY: CONTINUOUS

## 2023-03-24 NOTE — CONSULTS
Authorizing Provider   bumetanide (BUMEX) 2 MG tablet Take 2 mg by mouth 2 times daily    Historical Provider, MD   oxybutynin (DITROPAN XL) 10 MG extended release tablet Take 1 tablet by mouth daily 11/22/22   ABRAM Gonzalez - IRIS   mometasone-formoterol (DULERA) 200-5 MCG/ACT inhaler Inhale 2 puffs into the lungs in the morning and 2 puffs in the evening. 11/16/22 11/16/23  Saul Solitario A Aracelis, DO   albuterol sulfate HFA (VENTOLIN HFA) 108 (90 Base) MCG/ACT inhaler Inhale 2 puffs into the lungs 4 times daily as needed for Wheezing 11/16/22   Saul Solitario A Aracelis, DO   montelukast (SINGULAIR) 10 MG tablet Take 1 tablet by mouth nightly 11/16/22   Brittanie Tristen Aracelis, DO   irbesartan (AVAPRO) 300 MG tablet Take 300 mg by mouth nightly    Historical Provider, MD   spironolactone (ALDACTONE) 25 MG tablet Take 50 mg by mouth daily     Historical Provider, MD   amLODIPine (NORVASC) 10 MG tablet take 1 tablet by mouth once daily 4/22/22   Historical Provider, MD   ascorbic acid (VITAMIN C) 500 MG tablet Take 1 tablet by mouth 2 times daily for 7 days 1/13/22 11/30/22  Jamila Dean PA-C   EPINEPHrine (EPIPEN) 0.3 MG/0.3ML SOAJ injection Inject 0.3 mg into the muscle as needed Use as directed for allergic reaction  Patient not taking: No sig reported    Historical Provider, MD   atorvastatin (LIPITOR) 20 MG tablet Take 1 tablet by mouth daily 9/26/18   Brittani Mercado MD   gabapentin (NEURONTIN) 100 MG capsule take 3 capsules by mouth at bedtime.   Patient taking differently: 300 mg. take 3 capsules by mouth at bedtime 6/26/18 3/22/23  Belén Quinonez MD   tiZANidine (ZANAFLEX) 4 MG tablet Take 4 mg by mouth as needed 11/27/17   Historical Provider, MD   ranitidine (ZANTAC) 300 MG tablet Take 1 tablet by mouth nightly 1/2/18   Donna White MD   metoclopramide (REGLAN) 5 MG tablet Take 1 tablet by mouth 3 times daily 1/2/18   Donna White MD   pantoprazole (PROTONIX) 40 MG tablet take 1 tablet by mouth once daily
Weight:   Wt Readings from Last 3 Encounters:   03/22/23 (!) 412 lb 14.4 oz (187.3 kg)   03/19/23 (!) 410 lb (186 kg)   11/30/22 (!) 406 lb (184.2 kg)     Constitutional and General Appearance: alert and cooperative, appears comfortable, no apparent distress, not diaphoretic, Appears well-developed and well-nourished. Eyes: no icteric sclera in left eye or right eye, no discharge seen from left eye or right eye. EOM intact  Neck: good ROM  Lungs: no use of accessory muscles or labored breathing noted, Respiratory effort observed to be normal  Extremities: +++ Lymphedema changes  Musculo: moves all extremities  Neuro: no slurred speech, no facial drooping  Psychiatric: Normal mood and affect, Not agitated  Mental status: Alert and awake, Oriented to person/place/time, Able to follow commands      Due to this being a TeleHealth encounter, evaluation of the following organ systems is limited: EENT/Resp/CV/GI//MS/Neuro/Skin/Lymph      Lab Data  CBC:   Recent Labs     03/22/23  1312 03/23/23  0558   WBC 9.0 7.5   HGB 11.1* 9.6*   HCT 35.6* 30.0*    247     BMP:  Recent Labs     03/22/23  1312 03/22/23  1810 03/23/23  0558     --  136   K 4.1  --  4.6     --  108*   CO2 14*  --  18*   BUN 25*  --  24*   CREATININE 4.39*  --  4.44*   GLUCOSE 84  --  91   CALCIUM 9.1  --  9.1   MG  --  1.9  --      Hepatic:   Recent Labs     03/23/23  0558   LABALBU 3.6   AST 11   ALT 8   BILITOT 0.3   ALKPHOS 76       Additional Labs:  Diagnostics:  UA 2+ protein, 0-2 RBCs, urine protein creatinine ratio 2.2 g, urine sodium 71, urine chloride 47    Old tests reviewed. Echo: EF 55% July 2016  Labs: Old labs reviewed. Impression and Plan:  Acute kidney injury on ? CKD versus subacute kidney injury versus progressive CKD. I reviewed her chart in detail. I had extremely lengthy discussion with the patient via video visit. Patient serum creatinine was around 1.0 in December 2021.   I do not have any labs

## 2023-03-25 LAB
ABSOLUTE EOS #: <0.03 K/UL (ref 0–0.44)
ABSOLUTE IMMATURE GRANULOCYTE: 0.03 K/UL (ref 0–0.3)
ABSOLUTE LYMPH #: 2.06 K/UL (ref 1.1–3.7)
ABSOLUTE MONO #: 0.61 K/UL (ref 0.1–1.2)
ALBUMIN SERPL-MCNC: 3.9 G/DL (ref 3.5–5.2)
ALBUMIN/GLOBULIN RATIO: 1.1 (ref 1–2.5)
ALP SERPL-CCNC: 75 U/L (ref 35–104)
ALT SERPL-CCNC: 7 U/L (ref 5–33)
ANION GAP SERPL CALCULATED.3IONS-SCNC: 11 MMOL/L (ref 9–17)
AST SERPL-CCNC: 11 U/L
BASOPHILS # BLD: 1 % (ref 0–2)
BASOPHILS ABSOLUTE: 0.07 K/UL (ref 0–0.2)
BILIRUB SERPL-MCNC: 0.3 MG/DL (ref 0.3–1.2)
BUN SERPL-MCNC: 23 MG/DL (ref 6–20)
BUN/CREAT BLD: 6 (ref 9–20)
CALCIUM SERPL-MCNC: 9.5 MG/DL (ref 8.6–10.4)
CHLORIDE SERPL-SCNC: 105 MMOL/L (ref 98–107)
CO2 SERPL-SCNC: 23 MMOL/L (ref 20–31)
CREAT SERPL-MCNC: 4.07 MG/DL (ref 0.5–0.9)
CRP SERPL HS-MCNC: 16.3 MG/L (ref 0–5)
EOSINOPHILS RELATIVE PERCENT: 0 % (ref 1–4)
GFR SERPL CREATININE-BSD FRML MDRD: 13 ML/MIN/1.73M2
GLUCOSE SERPL-MCNC: 89 MG/DL (ref 70–99)
HCT VFR BLD AUTO: 32.1 % (ref 36.3–47.1)
HGB BLD-MCNC: 10.3 G/DL (ref 11.9–15.1)
IMMATURE GRANULOCYTES: 0 %
LYMPHOCYTES # BLD: 24 % (ref 24–43)
MCH RBC QN AUTO: 29.9 PG (ref 25.2–33.5)
MCHC RBC AUTO-ENTMCNC: 32.1 G/DL (ref 28.4–34.8)
MCV RBC AUTO: 93.3 FL (ref 82.6–102.9)
MONOCYTES # BLD: 7 % (ref 3–12)
NRBC AUTOMATED: 0 PER 100 WBC
PDW BLD-RTO: 14.1 % (ref 11.8–14.4)
PLATELET # BLD AUTO: 255 K/UL (ref 138–453)
PMV BLD AUTO: 9.9 FL (ref 8.1–13.5)
POTASSIUM SERPL-SCNC: 4.4 MMOL/L (ref 3.7–5.3)
PROT SERPL-MCNC: 7.3 G/DL (ref 6.4–8.3)
RBC # BLD: 3.44 M/UL (ref 3.95–5.11)
SEG NEUTROPHILS: 68 % (ref 36–65)
SEGMENTED NEUTROPHILS ABSOLUTE COUNT: 5.67 K/UL (ref 1.5–8.1)
SODIUM SERPL-SCNC: 139 MMOL/L (ref 135–144)
WBC # BLD AUTO: 8.5 K/UL (ref 3.5–11.3)

## 2023-03-25 PROCEDURE — 6370000000 HC RX 637 (ALT 250 FOR IP): Performed by: NURSE PRACTITIONER

## 2023-03-25 PROCEDURE — 36415 COLL VENOUS BLD VENIPUNCTURE: CPT

## 2023-03-25 PROCEDURE — 2500000003 HC RX 250 WO HCPCS: Performed by: INTERNAL MEDICINE

## 2023-03-25 PROCEDURE — 97535 SELF CARE MNGMENT TRAINING: CPT

## 2023-03-25 PROCEDURE — 80053 COMPREHEN METABOLIC PANEL: CPT

## 2023-03-25 PROCEDURE — 85025 COMPLETE CBC W/AUTO DIFF WBC: CPT

## 2023-03-25 PROCEDURE — 6360000002 HC RX W HCPCS: Performed by: STUDENT IN AN ORGANIZED HEALTH CARE EDUCATION/TRAINING PROGRAM

## 2023-03-25 PROCEDURE — 94761 N-INVAS EAR/PLS OXIMETRY MLT: CPT

## 2023-03-25 PROCEDURE — 94664 DEMO&/EVAL PT USE INHALER: CPT

## 2023-03-25 PROCEDURE — 94640 AIRWAY INHALATION TREATMENT: CPT

## 2023-03-25 PROCEDURE — 97110 THERAPEUTIC EXERCISES: CPT

## 2023-03-25 PROCEDURE — 1200000000 HC SEMI PRIVATE

## 2023-03-25 PROCEDURE — 6370000000 HC RX 637 (ALT 250 FOR IP): Performed by: INTERNAL MEDICINE

## 2023-03-25 PROCEDURE — 86140 C-REACTIVE PROTEIN: CPT

## 2023-03-25 PROCEDURE — 2580000003 HC RX 258: Performed by: INTERNAL MEDICINE

## 2023-03-25 PROCEDURE — 99233 SBSQ HOSP IP/OBS HIGH 50: CPT | Performed by: INTERNAL MEDICINE

## 2023-03-25 PROCEDURE — 6370000000 HC RX 637 (ALT 250 FOR IP): Performed by: STUDENT IN AN ORGANIZED HEALTH CARE EDUCATION/TRAINING PROGRAM

## 2023-03-25 PROCEDURE — 2580000003 HC RX 258: Performed by: STUDENT IN AN ORGANIZED HEALTH CARE EDUCATION/TRAINING PROGRAM

## 2023-03-25 RX ORDER — HYDRALAZINE HYDROCHLORIDE 50 MG/1
50 TABLET, FILM COATED ORAL EVERY 8 HOURS SCHEDULED
Status: DISCONTINUED | OUTPATIENT
Start: 2023-03-25 | End: 2023-03-25

## 2023-03-25 RX ORDER — LABETALOL HYDROCHLORIDE 5 MG/ML
10 INJECTION, SOLUTION INTRAVENOUS EVERY 6 HOURS PRN
Status: DISCONTINUED | OUTPATIENT
Start: 2023-03-25 | End: 2023-03-27 | Stop reason: HOSPADM

## 2023-03-25 RX ORDER — HYDRALAZINE HYDROCHLORIDE 50 MG/1
100 TABLET, FILM COATED ORAL EVERY 8 HOURS SCHEDULED
Status: DISCONTINUED | OUTPATIENT
Start: 2023-03-25 | End: 2023-03-27 | Stop reason: HOSPADM

## 2023-03-25 RX ORDER — CARVEDILOL 12.5 MG/1
12.5 TABLET ORAL 2 TIMES DAILY WITH MEALS
Status: DISCONTINUED | OUTPATIENT
Start: 2023-03-25 | End: 2023-03-27

## 2023-03-25 RX ADMIN — HYDRALAZINE HYDROCHLORIDE 100 MG: 50 TABLET, FILM COATED ORAL at 22:55

## 2023-03-25 RX ADMIN — CARVEDILOL 12.5 MG: 12.5 TABLET, FILM COATED ORAL at 09:56

## 2023-03-25 RX ADMIN — GUAIFENESIN AND DEXTROMETHORPHAN HYDROBROMIDE 1 TABLET: 600; 30 TABLET, EXTENDED RELEASE ORAL at 09:56

## 2023-03-25 RX ADMIN — HEPARIN SODIUM 7500 UNITS: 5000 INJECTION INTRAVENOUS; SUBCUTANEOUS at 05:44

## 2023-03-25 RX ADMIN — MOMETASONE FUROATE AND FORMOTEROL FUMARATE DIHYDRATE 2 PUFF: 200; 5 AEROSOL RESPIRATORY (INHALATION) at 20:06

## 2023-03-25 RX ADMIN — FLUTICASONE PROPIONATE 1 SPRAY: 50 SPRAY, METERED NASAL at 10:00

## 2023-03-25 RX ADMIN — MOMETASONE FUROATE AND FORMOTEROL FUMARATE DIHYDRATE 2 PUFF: 200; 5 AEROSOL RESPIRATORY (INHALATION) at 10:30

## 2023-03-25 RX ADMIN — SODIUM BICARBONATE: 84 INJECTION, SOLUTION INTRAVENOUS at 00:15

## 2023-03-25 RX ADMIN — AMLODIPINE BESYLATE 10 MG: 10 TABLET ORAL at 06:29

## 2023-03-25 RX ADMIN — DOCUSATE SODIUM 100 MG: 100 CAPSULE, LIQUID FILLED ORAL at 09:56

## 2023-03-25 RX ADMIN — PANTOPRAZOLE SODIUM 40 MG: 40 TABLET, DELAYED RELEASE ORAL at 09:56

## 2023-03-25 RX ADMIN — SODIUM CHLORIDE, PRESERVATIVE FREE 10 ML: 5 INJECTION INTRAVENOUS at 10:00

## 2023-03-25 RX ADMIN — ALBUTEROL SULFATE 2.5 MG: 2.5 SOLUTION RESPIRATORY (INHALATION) at 16:07

## 2023-03-25 RX ADMIN — ACETAMINOPHEN 650 MG: 325 TABLET ORAL at 00:20

## 2023-03-25 RX ADMIN — ALBUTEROL SULFATE 2.5 MG: 2.5 SOLUTION RESPIRATORY (INHALATION) at 10:30

## 2023-03-25 RX ADMIN — THIAMINE HCL TAB 100 MG 100 MG: 100 TAB at 09:56

## 2023-03-25 RX ADMIN — ACETAMINOPHEN 650 MG: 325 TABLET ORAL at 09:56

## 2023-03-25 RX ADMIN — HEPARIN SODIUM 7500 UNITS: 5000 INJECTION INTRAVENOUS; SUBCUTANEOUS at 22:56

## 2023-03-25 RX ADMIN — ACETAMINOPHEN 650 MG: 325 TABLET ORAL at 17:13

## 2023-03-25 RX ADMIN — LABETALOL HYDROCHLORIDE 10 MG: 5 INJECTION INTRAVENOUS at 00:18

## 2023-03-25 RX ADMIN — CARVEDILOL 12.5 MG: 12.5 TABLET, FILM COATED ORAL at 17:18

## 2023-03-25 RX ADMIN — MONTELUKAST 10 MG: 10 TABLET, FILM COATED ORAL at 22:55

## 2023-03-25 RX ADMIN — LABETALOL HYDROCHLORIDE 10 MG: 5 INJECTION INTRAVENOUS at 11:14

## 2023-03-25 RX ADMIN — HYDRALAZINE HYDROCHLORIDE 50 MG: 50 TABLET, FILM COATED ORAL at 05:44

## 2023-03-25 RX ADMIN — FERROUS SULFATE TAB 325 MG (65 MG ELEMENTAL FE) 325 MG: 325 (65 FE) TAB at 09:56

## 2023-03-25 RX ADMIN — SODIUM CHLORIDE, PRESERVATIVE FREE 10 ML: 5 INJECTION INTRAVENOUS at 22:57

## 2023-03-25 RX ADMIN — TIZANIDINE 4 MG: 4 TABLET ORAL at 00:20

## 2023-03-25 RX ADMIN — ALBUTEROL SULFATE 2.5 MG: 2.5 SOLUTION RESPIRATORY (INHALATION) at 20:06

## 2023-03-25 RX ADMIN — HYDRALAZINE HYDROCHLORIDE 100 MG: 50 TABLET, FILM COATED ORAL at 15:07

## 2023-03-25 RX ADMIN — DOCUSATE SODIUM 100 MG: 100 CAPSULE, LIQUID FILLED ORAL at 22:56

## 2023-03-25 RX ADMIN — ATORVASTATIN CALCIUM 20 MG: 20 TABLET, FILM COATED ORAL at 09:56

## 2023-03-25 RX ADMIN — GUAIFENESIN AND DEXTROMETHORPHAN HYDROBROMIDE 1 TABLET: 600; 30 TABLET, EXTENDED RELEASE ORAL at 22:55

## 2023-03-25 RX ADMIN — BENZONATATE 100 MG: 100 CAPSULE ORAL at 05:47

## 2023-03-25 RX ADMIN — HEPARIN SODIUM 7500 UNITS: 5000 INJECTION INTRAVENOUS; SUBCUTANEOUS at 15:06

## 2023-03-25 ASSESSMENT — PAIN DESCRIPTION - DESCRIPTORS
DESCRIPTORS: ACHING
DESCRIPTORS: ACHING
DESCRIPTORS: ACHING;PRESSURE
DESCRIPTORS: ACHING;PRESSURE
DESCRIPTORS: ACHING

## 2023-03-25 ASSESSMENT — PAIN SCALES - GENERAL
PAINLEVEL_OUTOF10: 8
PAINLEVEL_OUTOF10: 6
PAINLEVEL_OUTOF10: 9
PAINLEVEL_OUTOF10: 8
PAINLEVEL_OUTOF10: 8

## 2023-03-25 ASSESSMENT — PAIN DESCRIPTION - FREQUENCY
FREQUENCY: INTERMITTENT

## 2023-03-25 ASSESSMENT — PAIN DESCRIPTION - PAIN TYPE
TYPE: CHRONIC PAIN

## 2023-03-25 ASSESSMENT — PAIN DESCRIPTION - LOCATION
LOCATION: BACK;LEG;KNEE
LOCATION: OTHER (COMMENT)
LOCATION: BACK;OTHER (COMMENT)
LOCATION: BACK;KNEE;LEG
LOCATION: BACK;OTHER (COMMENT)

## 2023-03-25 ASSESSMENT — PAIN - FUNCTIONAL ASSESSMENT
PAIN_FUNCTIONAL_ASSESSMENT: ACTIVITIES ARE NOT PREVENTED

## 2023-03-25 ASSESSMENT — PAIN DESCRIPTION - ONSET
ONSET: GRADUAL

## 2023-03-25 ASSESSMENT — PAIN DESCRIPTION - ORIENTATION
ORIENTATION: RIGHT;LEFT
ORIENTATION: LOWER
ORIENTATION: RIGHT;LEFT

## 2023-03-26 LAB
ABSOLUTE EOS #: <0.03 K/UL (ref 0–0.44)
ABSOLUTE IMMATURE GRANULOCYTE: 0.04 K/UL (ref 0–0.3)
ABSOLUTE LYMPH #: 2.64 K/UL (ref 1.1–3.7)
ABSOLUTE MONO #: 0.59 K/UL (ref 0.1–1.2)
ALBUMIN SERPL-MCNC: 3.8 G/DL (ref 3.5–5.2)
ALBUMIN/GLOBULIN RATIO: 1.2 (ref 1–2.5)
ALP SERPL-CCNC: 70 U/L (ref 35–104)
ALT SERPL-CCNC: 7 U/L (ref 5–33)
ANION GAP SERPL CALCULATED.3IONS-SCNC: 12 MMOL/L (ref 9–17)
AST SERPL-CCNC: 12 U/L
BASOPHILS # BLD: 1 % (ref 0–2)
BASOPHILS ABSOLUTE: 0.05 K/UL (ref 0–0.2)
BILIRUB SERPL-MCNC: 0.3 MG/DL (ref 0.3–1.2)
BUN SERPL-MCNC: 26 MG/DL (ref 6–20)
BUN/CREAT BLD: 6 (ref 9–20)
CALCIUM SERPL-MCNC: 9.6 MG/DL (ref 8.6–10.4)
CHLORIDE SERPL-SCNC: 107 MMOL/L (ref 98–107)
CO2 SERPL-SCNC: 23 MMOL/L (ref 20–31)
CREAT SERPL-MCNC: 4.22 MG/DL (ref 0.5–0.9)
CRP SERPL HS-MCNC: 16 MG/L (ref 0–5)
EOSINOPHILS RELATIVE PERCENT: 0 % (ref 1–4)
GFR SERPL CREATININE-BSD FRML MDRD: 12 ML/MIN/1.73M2
GLUCOSE SERPL-MCNC: 88 MG/DL (ref 70–99)
HCT VFR BLD AUTO: 31.4 % (ref 36.3–47.1)
HGB BLD-MCNC: 10 G/DL (ref 11.9–15.1)
IMMATURE GRANULOCYTES: 1 %
LYMPHOCYTES # BLD: 32 % (ref 24–43)
MCH RBC QN AUTO: 30 PG (ref 25.2–33.5)
MCHC RBC AUTO-ENTMCNC: 31.8 G/DL (ref 28.4–34.8)
MCV RBC AUTO: 94.3 FL (ref 82.6–102.9)
MONOCYTES # BLD: 7 % (ref 3–12)
NRBC AUTOMATED: 0 PER 100 WBC
PDW BLD-RTO: 14.2 % (ref 11.8–14.4)
PLATELET # BLD AUTO: 256 K/UL (ref 138–453)
PMV BLD AUTO: 10.1 FL (ref 8.1–13.5)
POTASSIUM SERPL-SCNC: 4.5 MMOL/L (ref 3.7–5.3)
PROT SERPL-MCNC: 7 G/DL (ref 6.4–8.3)
RBC # BLD: 3.33 M/UL (ref 3.95–5.11)
SEG NEUTROPHILS: 59 % (ref 36–65)
SEGMENTED NEUTROPHILS ABSOLUTE COUNT: 4.86 K/UL (ref 1.5–8.1)
SODIUM SERPL-SCNC: 142 MMOL/L (ref 135–144)
WBC # BLD AUTO: 8.2 K/UL (ref 3.5–11.3)

## 2023-03-26 PROCEDURE — 2500000003 HC RX 250 WO HCPCS: Performed by: STUDENT IN AN ORGANIZED HEALTH CARE EDUCATION/TRAINING PROGRAM

## 2023-03-26 PROCEDURE — 6360000002 HC RX W HCPCS: Performed by: STUDENT IN AN ORGANIZED HEALTH CARE EDUCATION/TRAINING PROGRAM

## 2023-03-26 PROCEDURE — 6370000000 HC RX 637 (ALT 250 FOR IP): Performed by: INTERNAL MEDICINE

## 2023-03-26 PROCEDURE — 94664 DEMO&/EVAL PT USE INHALER: CPT

## 2023-03-26 PROCEDURE — 2580000003 HC RX 258: Performed by: STUDENT IN AN ORGANIZED HEALTH CARE EDUCATION/TRAINING PROGRAM

## 2023-03-26 PROCEDURE — 85025 COMPLETE CBC W/AUTO DIFF WBC: CPT

## 2023-03-26 PROCEDURE — 94640 AIRWAY INHALATION TREATMENT: CPT

## 2023-03-26 PROCEDURE — 86140 C-REACTIVE PROTEIN: CPT

## 2023-03-26 PROCEDURE — 6370000000 HC RX 637 (ALT 250 FOR IP): Performed by: STUDENT IN AN ORGANIZED HEALTH CARE EDUCATION/TRAINING PROGRAM

## 2023-03-26 PROCEDURE — 1200000000 HC SEMI PRIVATE

## 2023-03-26 PROCEDURE — 2500000003 HC RX 250 WO HCPCS: Performed by: INTERNAL MEDICINE

## 2023-03-26 PROCEDURE — 97535 SELF CARE MNGMENT TRAINING: CPT

## 2023-03-26 PROCEDURE — 80053 COMPREHEN METABOLIC PANEL: CPT

## 2023-03-26 PROCEDURE — 94761 N-INVAS EAR/PLS OXIMETRY MLT: CPT

## 2023-03-26 PROCEDURE — 6370000000 HC RX 637 (ALT 250 FOR IP): Performed by: NURSE PRACTITIONER

## 2023-03-26 PROCEDURE — 97530 THERAPEUTIC ACTIVITIES: CPT

## 2023-03-26 PROCEDURE — 36415 COLL VENOUS BLD VENIPUNCTURE: CPT

## 2023-03-26 RX ORDER — DOXAZOSIN MESYLATE 4 MG/1
4 TABLET ORAL 2 TIMES DAILY
Status: DISCONTINUED | OUTPATIENT
Start: 2023-03-26 | End: 2023-03-27 | Stop reason: HOSPADM

## 2023-03-26 RX ORDER — DIPHENHYDRAMINE HCL 25 MG
25 CAPSULE ORAL EVERY 6 HOURS PRN
Status: DISCONTINUED | OUTPATIENT
Start: 2023-03-26 | End: 2023-03-27 | Stop reason: HOSPADM

## 2023-03-26 RX ADMIN — DOXAZOSIN 4 MG: 4 TABLET ORAL at 21:48

## 2023-03-26 RX ADMIN — DOCUSATE SODIUM 100 MG: 100 CAPSULE, LIQUID FILLED ORAL at 10:51

## 2023-03-26 RX ADMIN — TIZANIDINE 4 MG: 4 TABLET ORAL at 00:18

## 2023-03-26 RX ADMIN — FERROUS SULFATE TAB 325 MG (65 MG ELEMENTAL FE) 325 MG: 325 (65 FE) TAB at 10:51

## 2023-03-26 RX ADMIN — SODIUM CHLORIDE, PRESERVATIVE FREE 10 ML: 5 INJECTION INTRAVENOUS at 10:52

## 2023-03-26 RX ADMIN — HYDRALAZINE HYDROCHLORIDE 100 MG: 50 TABLET, FILM COATED ORAL at 05:39

## 2023-03-26 RX ADMIN — HYDRALAZINE HYDROCHLORIDE 100 MG: 50 TABLET, FILM COATED ORAL at 14:08

## 2023-03-26 RX ADMIN — GUAIFENESIN AND DEXTROMETHORPHAN HYDROBROMIDE 1 TABLET: 600; 30 TABLET, EXTENDED RELEASE ORAL at 21:48

## 2023-03-26 RX ADMIN — ACETAMINOPHEN 650 MG: 325 TABLET ORAL at 10:51

## 2023-03-26 RX ADMIN — ALBUTEROL SULFATE 2.5 MG: 2.5 SOLUTION RESPIRATORY (INHALATION) at 20:43

## 2023-03-26 RX ADMIN — ALBUTEROL SULFATE 2.5 MG: 2.5 SOLUTION RESPIRATORY (INHALATION) at 14:56

## 2023-03-26 RX ADMIN — DOCUSATE SODIUM 100 MG: 100 CAPSULE, LIQUID FILLED ORAL at 21:48

## 2023-03-26 RX ADMIN — LABETALOL HYDROCHLORIDE 10 MG: 5 INJECTION INTRAVENOUS at 01:41

## 2023-03-26 RX ADMIN — ONDANSETRON 4 MG: 2 INJECTION INTRAMUSCULAR; INTRAVENOUS at 08:27

## 2023-03-26 RX ADMIN — CARVEDILOL 12.5 MG: 12.5 TABLET, FILM COATED ORAL at 17:20

## 2023-03-26 RX ADMIN — HEPARIN SODIUM 7500 UNITS: 5000 INJECTION INTRAVENOUS; SUBCUTANEOUS at 21:48

## 2023-03-26 RX ADMIN — DOXAZOSIN 4 MG: 4 TABLET ORAL at 10:51

## 2023-03-26 RX ADMIN — BENZONATATE 100 MG: 100 CAPSULE ORAL at 22:00

## 2023-03-26 RX ADMIN — PANTOPRAZOLE SODIUM 40 MG: 40 TABLET, DELAYED RELEASE ORAL at 10:51

## 2023-03-26 RX ADMIN — MOMETASONE FUROATE AND FORMOTEROL FUMARATE DIHYDRATE 2 PUFF: 200; 5 AEROSOL RESPIRATORY (INHALATION) at 10:26

## 2023-03-26 RX ADMIN — GUAIFENESIN AND DEXTROMETHORPHAN HYDROBROMIDE 1 TABLET: 600; 30 TABLET, EXTENDED RELEASE ORAL at 10:51

## 2023-03-26 RX ADMIN — THIAMINE HCL TAB 100 MG 100 MG: 100 TAB at 10:51

## 2023-03-26 RX ADMIN — ACETAMINOPHEN 650 MG: 325 TABLET ORAL at 05:39

## 2023-03-26 RX ADMIN — MONTELUKAST 10 MG: 10 TABLET, FILM COATED ORAL at 21:48

## 2023-03-26 RX ADMIN — ATORVASTATIN CALCIUM 20 MG: 20 TABLET, FILM COATED ORAL at 10:51

## 2023-03-26 RX ADMIN — ALBUTEROL SULFATE 2.5 MG: 2.5 SOLUTION RESPIRATORY (INHALATION) at 10:26

## 2023-03-26 RX ADMIN — SODIUM CHLORIDE, PRESERVATIVE FREE 10 ML: 5 INJECTION INTRAVENOUS at 22:13

## 2023-03-26 RX ADMIN — ACETAMINOPHEN 650 MG: 325 TABLET ORAL at 00:18

## 2023-03-26 RX ADMIN — CARVEDILOL 12.5 MG: 12.5 TABLET, FILM COATED ORAL at 10:51

## 2023-03-26 RX ADMIN — MOMETASONE FUROATE AND FORMOTEROL FUMARATE DIHYDRATE 2 PUFF: 200; 5 AEROSOL RESPIRATORY (INHALATION) at 20:44

## 2023-03-26 RX ADMIN — SILVER SULFADIAZINE: 10 CREAM TOPICAL at 08:32

## 2023-03-26 RX ADMIN — DIPHENHYDRAMINE HYDROCHLORIDE 25 MG: 25 CAPSULE ORAL at 12:57

## 2023-03-26 RX ADMIN — TIZANIDINE 4 MG: 4 TABLET ORAL at 10:51

## 2023-03-26 RX ADMIN — AMLODIPINE BESYLATE 10 MG: 10 TABLET ORAL at 10:51

## 2023-03-26 RX ADMIN — BENZONATATE 100 MG: 100 CAPSULE ORAL at 05:41

## 2023-03-26 RX ADMIN — HEPARIN SODIUM 7500 UNITS: 5000 INJECTION INTRAVENOUS; SUBCUTANEOUS at 05:39

## 2023-03-26 RX ADMIN — HEPARIN SODIUM 7500 UNITS: 5000 INJECTION INTRAVENOUS; SUBCUTANEOUS at 14:08

## 2023-03-26 ASSESSMENT — PAIN SCALES - GENERAL
PAINLEVEL_OUTOF10: 4
PAINLEVEL_OUTOF10: 8
PAINLEVEL_OUTOF10: 8

## 2023-03-26 ASSESSMENT — PAIN DESCRIPTION - DESCRIPTORS
DESCRIPTORS: ACHING

## 2023-03-26 ASSESSMENT — PAIN DESCRIPTION - PAIN TYPE
TYPE: CHRONIC PAIN

## 2023-03-26 ASSESSMENT — PAIN DESCRIPTION - FREQUENCY
FREQUENCY: CONTINUOUS
FREQUENCY: INTERMITTENT
FREQUENCY: INTERMITTENT

## 2023-03-26 ASSESSMENT — PAIN DESCRIPTION - LOCATION
LOCATION: GENERALIZED

## 2023-03-26 ASSESSMENT — PAIN DESCRIPTION - ONSET
ONSET: ON-GOING
ONSET: ON-GOING
ONSET: GRADUAL

## 2023-03-26 ASSESSMENT — PAIN - FUNCTIONAL ASSESSMENT
PAIN_FUNCTIONAL_ASSESSMENT: ACTIVITIES ARE NOT PREVENTED

## 2023-03-26 ASSESSMENT — PAIN DESCRIPTION - ORIENTATION: ORIENTATION: RIGHT;LEFT

## 2023-03-27 ENCOUNTER — TELEPHONE (OUTPATIENT)
Dept: NEPHROLOGY | Age: 53
End: 2023-03-27

## 2023-03-27 VITALS
TEMPERATURE: 98.1 F | RESPIRATION RATE: 18 BRPM | WEIGHT: 293 LBS | HEIGHT: 66 IN | OXYGEN SATURATION: 95 % | DIASTOLIC BLOOD PRESSURE: 75 MMHG | BODY MASS INDEX: 47.09 KG/M2 | SYSTOLIC BLOOD PRESSURE: 148 MMHG | HEART RATE: 76 BPM

## 2023-03-27 DIAGNOSIS — R79.89 ELEVATED SERUM CREATININE: Primary | ICD-10-CM

## 2023-03-27 LAB
ABSOLUTE EOS #: 0.08 K/UL (ref 0–0.44)
ABSOLUTE IMMATURE GRANULOCYTE: 0 K/UL (ref 0–0.3)
ABSOLUTE LYMPH #: 2.77 K/UL (ref 1.1–3.7)
ABSOLUTE MONO #: 0.34 K/UL (ref 0.1–1.2)
ALBUMIN (CALCULATED): 4.1 G/DL (ref 3.2–5.2)
ALBUMIN PERCENT: 59 % (ref 45–65)
ALBUMIN SERPL-MCNC: 3.4 G/DL (ref 3.5–5.2)
ALBUMIN/GLOBULIN RATIO: 0.9 (ref 1–2.5)
ALP SERPL-CCNC: 64 U/L (ref 35–104)
ALPHA 1 PERCENT: 3 % (ref 3–6)
ALPHA 2 PERCENT: 13 % (ref 6–13)
ALPHA1 GLOB SERPL ELPH-MCNC: 0.2 G/DL (ref 0.1–0.4)
ALPHA2 GLOB SERPL ELPH-MCNC: 0.9 G/DL (ref 0.5–0.9)
ALT SERPL-CCNC: 9 U/L (ref 5–33)
ANION GAP SERPL CALCULATED.3IONS-SCNC: 12 MMOL/L (ref 9–17)
AST SERPL-CCNC: 16 U/L
B-GLOBULIN SERPL ELPH-MCNC: 0.8 G/DL (ref 0.5–1.1)
BASOPHILS # BLD: 0 % (ref 0–2)
BASOPHILS ABSOLUTE: 0 K/UL (ref 0–0.2)
BETA PERCENT: 11 % (ref 11–19)
BILIRUB SERPL-MCNC: 0.3 MG/DL (ref 0.3–1.2)
BUN SERPL-MCNC: 30 MG/DL (ref 6–20)
BUN/CREAT BLD: 7 (ref 9–20)
CALCIUM SERPL-MCNC: 9 MG/DL (ref 8.6–10.4)
CHLORIDE SERPL-SCNC: 102 MMOL/L (ref 98–107)
CO2 SERPL-SCNC: 20 MMOL/L (ref 20–31)
CREAT SERPL-MCNC: 4.44 MG/DL (ref 0.5–0.9)
CRP SERPL HS-MCNC: 10.3 MG/L (ref 0–5)
EOSINOPHILS RELATIVE PERCENT: 1 % (ref 1–4)
GAMMA GLOB SERPL ELPH-MCNC: 1 G/DL (ref 0.5–1.5)
GAMMA GLOBULIN %: 15 % (ref 9–20)
GFR SERPL CREATININE-BSD FRML MDRD: 11 ML/MIN/1.73M2
GLUCOSE SERPL-MCNC: 89 MG/DL (ref 70–99)
HCT VFR BLD AUTO: 31.7 % (ref 36.3–47.1)
HGB BLD-MCNC: 9.8 G/DL (ref 11.9–15.1)
IMMATURE GRANULOCYTES: 0 %
LYMPHOCYTES # BLD: 33 % (ref 24–43)
MCH RBC QN AUTO: 29.6 PG (ref 25.2–33.5)
MCHC RBC AUTO-ENTMCNC: 30.9 G/DL (ref 28.4–34.8)
MCV RBC AUTO: 95.8 FL (ref 82.6–102.9)
MONOCYTES # BLD: 4 % (ref 3–12)
MORPHOLOGY: ABNORMAL
MORPHOLOGY: ABNORMAL
NRBC AUTOMATED: 0 PER 100 WBC
PATHOLOGIST: NORMAL
PDW BLD-RTO: 14.4 % (ref 11.8–14.4)
PLATELET # BLD AUTO: 175 K/UL (ref 138–453)
PMV BLD AUTO: 11.9 FL (ref 8.1–13.5)
POTASSIUM SERPL-SCNC: 4.7 MMOL/L (ref 3.7–5.3)
PROT SERPL-MCNC: 6.9 G/DL (ref 6.4–8.3)
PROT SERPL-MCNC: 7.1 G/DL (ref 6.4–8.3)
PROTEIN ELECTROPHORESIS, SERUM: NORMAL
RBC # BLD: 3.31 M/UL (ref 3.95–5.11)
SEG NEUTROPHILS: 62 % (ref 36–65)
SEGMENTED NEUTROPHILS ABSOLUTE COUNT: 5.21 K/UL (ref 1.5–8.1)
SODIUM SERPL-SCNC: 134 MMOL/L (ref 135–144)
TOTAL PROT. SUM,%: 101 % (ref 98–102)
TOTAL PROT. SUM: 7 G/DL (ref 6.3–8.2)
WBC # BLD AUTO: 8.4 K/UL (ref 3.5–11.3)

## 2023-03-27 PROCEDURE — 6360000002 HC RX W HCPCS: Performed by: STUDENT IN AN ORGANIZED HEALTH CARE EDUCATION/TRAINING PROGRAM

## 2023-03-27 PROCEDURE — 2580000003 HC RX 258: Performed by: STUDENT IN AN ORGANIZED HEALTH CARE EDUCATION/TRAINING PROGRAM

## 2023-03-27 PROCEDURE — 6370000000 HC RX 637 (ALT 250 FOR IP): Performed by: NURSE PRACTITIONER

## 2023-03-27 PROCEDURE — 6370000000 HC RX 637 (ALT 250 FOR IP): Performed by: INTERNAL MEDICINE

## 2023-03-27 PROCEDURE — 97535 SELF CARE MNGMENT TRAINING: CPT

## 2023-03-27 PROCEDURE — 97116 GAIT TRAINING THERAPY: CPT

## 2023-03-27 PROCEDURE — 80053 COMPREHEN METABOLIC PANEL: CPT

## 2023-03-27 PROCEDURE — 97530 THERAPEUTIC ACTIVITIES: CPT

## 2023-03-27 PROCEDURE — 94640 AIRWAY INHALATION TREATMENT: CPT

## 2023-03-27 PROCEDURE — 36415 COLL VENOUS BLD VENIPUNCTURE: CPT

## 2023-03-27 PROCEDURE — 99232 SBSQ HOSP IP/OBS MODERATE 35: CPT | Performed by: INTERNAL MEDICINE

## 2023-03-27 PROCEDURE — 2500000003 HC RX 250 WO HCPCS: Performed by: STUDENT IN AN ORGANIZED HEALTH CARE EDUCATION/TRAINING PROGRAM

## 2023-03-27 PROCEDURE — 97110 THERAPEUTIC EXERCISES: CPT

## 2023-03-27 PROCEDURE — 94761 N-INVAS EAR/PLS OXIMETRY MLT: CPT

## 2023-03-27 PROCEDURE — 85025 COMPLETE CBC W/AUTO DIFF WBC: CPT

## 2023-03-27 PROCEDURE — 6370000000 HC RX 637 (ALT 250 FOR IP): Performed by: STUDENT IN AN ORGANIZED HEALTH CARE EDUCATION/TRAINING PROGRAM

## 2023-03-27 PROCEDURE — 94664 DEMO&/EVAL PT USE INHALER: CPT

## 2023-03-27 PROCEDURE — 86140 C-REACTIVE PROTEIN: CPT

## 2023-03-27 RX ORDER — HYDRALAZINE HYDROCHLORIDE 100 MG/1
100 TABLET, FILM COATED ORAL EVERY 8 HOURS SCHEDULED
Qty: 90 TABLET | Refills: 3 | Status: SHIPPED | OUTPATIENT
Start: 2023-03-27

## 2023-03-27 RX ORDER — BUMETANIDE 1 MG/1
1 TABLET ORAL 2 TIMES DAILY
Status: DISCONTINUED | OUTPATIENT
Start: 2023-03-28 | End: 2023-03-27 | Stop reason: HOSPADM

## 2023-03-27 RX ORDER — BUMETANIDE 1 MG/1
1 TABLET ORAL 2 TIMES DAILY
Qty: 30 TABLET | Refills: 3 | Status: SHIPPED | OUTPATIENT
Start: 2023-03-28

## 2023-03-27 RX ORDER — CARVEDILOL 25 MG/1
25 TABLET ORAL 2 TIMES DAILY WITH MEALS
Status: DISCONTINUED | OUTPATIENT
Start: 2023-03-27 | End: 2023-03-27 | Stop reason: HOSPADM

## 2023-03-27 RX ORDER — DOXAZOSIN MESYLATE 4 MG/1
4 TABLET ORAL 2 TIMES DAILY
Qty: 30 TABLET | Refills: 3 | Status: SHIPPED | OUTPATIENT
Start: 2023-03-27

## 2023-03-27 RX ORDER — TIZANIDINE 4 MG/1
4 TABLET ORAL EVERY 8 HOURS PRN
Qty: 30 TABLET | Refills: 0
Start: 2023-03-27

## 2023-03-27 RX ORDER — CARVEDILOL 25 MG/1
25 TABLET ORAL 2 TIMES DAILY WITH MEALS
Qty: 60 TABLET | Refills: 3 | Status: SHIPPED | OUTPATIENT
Start: 2023-03-27

## 2023-03-27 RX ADMIN — ACETAMINOPHEN 650 MG: 325 TABLET ORAL at 10:08

## 2023-03-27 RX ADMIN — ATORVASTATIN CALCIUM 20 MG: 20 TABLET, FILM COATED ORAL at 10:05

## 2023-03-27 RX ADMIN — FLUTICASONE PROPIONATE 1 SPRAY: 50 SPRAY, METERED NASAL at 10:10

## 2023-03-27 RX ADMIN — PANTOPRAZOLE SODIUM 40 MG: 40 TABLET, DELAYED RELEASE ORAL at 10:06

## 2023-03-27 RX ADMIN — ALBUTEROL SULFATE 2.5 MG: 2.5 SOLUTION RESPIRATORY (INHALATION) at 15:17

## 2023-03-27 RX ADMIN — ALBUTEROL SULFATE 2.5 MG: 2.5 SOLUTION RESPIRATORY (INHALATION) at 09:12

## 2023-03-27 RX ADMIN — SILVER SULFADIAZINE: 10 CREAM TOPICAL at 11:41

## 2023-03-27 RX ADMIN — FERROUS SULFATE TAB 325 MG (65 MG ELEMENTAL FE) 325 MG: 325 (65 FE) TAB at 10:00

## 2023-03-27 RX ADMIN — CARVEDILOL 12.5 MG: 12.5 TABLET, FILM COATED ORAL at 10:00

## 2023-03-27 RX ADMIN — MOMETASONE FUROATE AND FORMOTEROL FUMARATE DIHYDRATE 2 PUFF: 200; 5 AEROSOL RESPIRATORY (INHALATION) at 09:12

## 2023-03-27 RX ADMIN — ACETAMINOPHEN 650 MG: 325 TABLET ORAL at 01:40

## 2023-03-27 RX ADMIN — DOXAZOSIN 4 MG: 4 TABLET ORAL at 10:05

## 2023-03-27 RX ADMIN — GUAIFENESIN AND DEXTROMETHORPHAN HYDROBROMIDE 1 TABLET: 600; 30 TABLET, EXTENDED RELEASE ORAL at 10:05

## 2023-03-27 RX ADMIN — SODIUM CHLORIDE, PRESERVATIVE FREE 10 ML: 5 INJECTION INTRAVENOUS at 10:11

## 2023-03-27 RX ADMIN — THIAMINE HCL TAB 100 MG 100 MG: 100 TAB at 10:05

## 2023-03-27 RX ADMIN — AMLODIPINE BESYLATE 10 MG: 10 TABLET ORAL at 10:05

## 2023-03-27 RX ADMIN — DOCUSATE SODIUM 100 MG: 100 CAPSULE, LIQUID FILLED ORAL at 10:05

## 2023-03-27 RX ADMIN — HEPARIN SODIUM 7500 UNITS: 5000 INJECTION INTRAVENOUS; SUBCUTANEOUS at 14:15

## 2023-03-27 RX ADMIN — TIZANIDINE 4 MG: 4 TABLET ORAL at 01:40

## 2023-03-27 RX ADMIN — HEPARIN SODIUM 7500 UNITS: 5000 INJECTION INTRAVENOUS; SUBCUTANEOUS at 05:36

## 2023-03-27 ASSESSMENT — PAIN DESCRIPTION - PAIN TYPE
TYPE: CHRONIC PAIN

## 2023-03-27 ASSESSMENT — PAIN DESCRIPTION - ORIENTATION
ORIENTATION: RIGHT;LEFT
ORIENTATION: RIGHT;LEFT
ORIENTATION: LEFT

## 2023-03-27 ASSESSMENT — PAIN DESCRIPTION - ONSET
ONSET: ON-GOING
ONSET: ON-GOING
ONSET: GRADUAL

## 2023-03-27 ASSESSMENT — PAIN - FUNCTIONAL ASSESSMENT
PAIN_FUNCTIONAL_ASSESSMENT: ACTIVITIES ARE NOT PREVENTED

## 2023-03-27 ASSESSMENT — PAIN DESCRIPTION - FREQUENCY
FREQUENCY: INTERMITTENT

## 2023-03-27 ASSESSMENT — PAIN SCALES - GENERAL
PAINLEVEL_OUTOF10: 8

## 2023-03-27 ASSESSMENT — PAIN DESCRIPTION - DESCRIPTORS
DESCRIPTORS: ACHING
DESCRIPTORS: ACHING
DESCRIPTORS: SHARP;THROBBING

## 2023-03-27 ASSESSMENT — PAIN DESCRIPTION - LOCATION
LOCATION: BACK;LEG;HIP
LOCATION: BACK;HIP;LEG
LOCATION: HIP

## 2023-03-27 NOTE — DISCHARGE SUMMARY
Guzman Montoya  Inhale 2 puffs into the lungs in the morning and 2 puffs in the evening.     montelukast 10 MG tablet  Commonly known as: SINGULAIR  Take 1 tablet by mouth nightly     pantoprazole 40 MG tablet  Commonly known as: PROTONIX  take 1 tablet by mouth once daily     silver sulfADIAZINE 1 % cream  Commonly known as: Silvadene  Apply  topically 2 times daily as needed. Apply topically daily. vitamin B-1 100 MG tablet  Commonly known as: THIAMINE           * This list has 2 medication(s) that are the same as other medications prescribed for you. Read the directions carefully, and ask your doctor or other care provider to review them with you.                 STOP taking these medications      ascorbic acid 500 MG tablet  Commonly known as: VITAMIN C     gabapentin 100 MG capsule  Commonly known as: NEURONTIN     irbesartan 300 MG tablet  Commonly known as: AVAPRO     metoclopramide 5 MG tablet  Commonly known as: Reglan     MVI (BARIATRIC ADVANTAGE MULTI-FORMULA) CHEW TAB     nabumetone 500 MG tablet  Commonly known as: RELAFEN     oxybutynin 10 MG extended release tablet  Commonly known as: Ditropan XL     potassium chloride 10 MEQ extended release tablet  Commonly known as: KLOR-CON     raNITIdine 300 MG tablet  Commonly known as: ZANTAC     spironolactone 25 MG tablet  Commonly known as: ALDACTONE     sulfamethoxazole-trimethoprim 800-160 MG per tablet  Commonly known as: BACTRIM DS;SEPTRA DS     VITAMIN D PO            ASK your doctor about these medications      EPINEPHrine 0.3 MG/0.3ML Soaj injection  Commonly known as: EPIPEN               Where to Get Your Medications        These medications were sent to [address] [place] [address] [id number] - [place], [address] [place] [address]  [address], [place] [address]      Phone: [phone]   bumetanide 1 MG tablet  carvedilol 25 MG tablet  doxazosin 4 MG tablet  hydrALAZINE 100 MG tablet       Information about where to get these

## 2023-03-27 NOTE — PLAN OF CARE
Problem: Discharge Planning  Goal: Discharge to home or other facility with appropriate resources  3/25/2023 0219 by Blaine Rader RN  Outcome: Progressing  Flowsheets (Taken 3/24/2023 1707 by Varun Thornton RN)  Discharge to home or other facility with appropriate resources: Identify barriers to discharge with patient and caregiver  3/24/2023 1707 by Varun Thornton RN  Outcome: Progressing  Flowsheets (Taken 3/24/2023 1707)  Discharge to home or other facility with appropriate resources: Identify barriers to discharge with patient and caregiver  Note: Pt from home, discharge plan is pending. Problem: Pain  Goal: Verbalizes/displays adequate comfort level or baseline comfort level  3/25/2023 0219 by Blaine Rader RN  Outcome: Progressing  Flowsheets (Taken 3/24/2023 1707 by Varun Thornton RN)  Verbalizes/displays adequate comfort level or baseline comfort level:   Encourage patient to monitor pain and request assistance   Assess pain using appropriate pain scale   Administer analgesics based on type and severity of pain and evaluate response   Implement non-pharmacological measures as appropriate and evaluate response  3/24/2023 1707 by Varun Thornton RN  Outcome: Progressing  Flowsheets (Taken 3/24/2023 1707)  Verbalizes/displays adequate comfort level or baseline comfort level:   Encourage patient to monitor pain and request assistance   Assess pain using appropriate pain scale   Administer analgesics based on type and severity of pain and evaluate response   Implement non-pharmacological measures as appropriate and evaluate response  Note: Pain assessed every four hours and as needed using 0-10 pain scale. Pt educated on scale and uses scale appropriately. Encourage pt to notify staff of pain before pain becomes uncontrollable. Correlate periods of heavy activity after pain medication administration.  Use pharmacological and non pharmacological methods for pain relief such as: warm blankets, ice, television,
Problem: Discharge Planning  Goal: Discharge to home or other facility with appropriate resources  Outcome: Completed     Problem: Pain  Goal: Verbalizes/displays adequate comfort level or baseline comfort level  Outcome: Completed     Problem: Safety - Adult  Goal: Free from fall injury  Outcome: Completed     Problem: Skin/Tissue Integrity  Goal: Absence of new skin breakdown  Description: 1. Monitor for areas of redness and/or skin breakdown  2. Assess vascular access sites hourly  3. Every 4-6 hours minimum:  Change oxygen saturation probe site  4. Every 4-6 hours:  If on nasal continuous positive airway pressure, respiratory therapy assess nares and determine need for appliance change or resting period.   Outcome: Completed     Problem: Risk for Elopement  Goal: Patient will not exit the unit/facility without proper excort  Outcome: Completed
Problem: Discharge Planning  Goal: Discharge to home or other facility with appropriate resources  Outcome: Progressing     Problem: Pain  Goal: Verbalizes/displays adequate comfort level or baseline comfort level  Outcome: Progressing     Problem: Safety - Adult  Goal: Free from fall injury  Outcome: Progressing  Flowsheets (Taken 3/23/2023 7220)  Free From Fall Injury: Instruct family/caregiver on patient safety
Problem: Discharge Planning  Goal: Discharge to home or other facility with appropriate resources  Outcome: Progressing     Problem: Pain  Goal: Verbalizes/displays adequate comfort level or baseline comfort level  Outcome: Progressing  Flowsheets (Taken 3/26/2023 1040 by Tabitha Salcedo RN)  Verbalizes/displays adequate comfort level or baseline comfort level: Encourage patient to monitor pain and request assistance     Problem: Safety - Adult  Goal: Free from fall injury  Outcome: Progressing     Problem: Skin/Tissue Integrity  Goal: Absence of new skin breakdown  Description: 1. Monitor for areas of redness and/or skin breakdown  2. Assess vascular access sites hourly  3. Every 4-6 hours minimum:  Change oxygen saturation probe site  4. Every 4-6 hours:  If on nasal continuous positive airway pressure, respiratory therapy assess nares and determine need for appliance change or resting period. Outcome: Progressing     Problem: Risk for Elopement  Goal: Patient will not exit the unit/facility without proper excort  Outcome: Progressing   Pt verbalizes understanding that she may not leave the floor without proper permission and escort. Will continue to monitor.
Problem: Discharge Planning  Goal: Discharge to home or other facility with appropriate resources  Outcome: Progressing  Flowsheets (Taken 3/26/2023 0233)  Discharge to home or other facility with appropriate resources:   Identify barriers to discharge with patient and caregiver   Arrange for needed discharge resources and transportation as appropriate     Problem: Pain  Goal: Verbalizes/displays adequate comfort level or baseline comfort level  Outcome: Progressing  Flowsheets (Taken 3/26/2023 0233)  Verbalizes/displays adequate comfort level or baseline comfort level:   Encourage patient to monitor pain and request assistance   Assess pain using appropriate pain scale   Administer analgesics based on type and severity of pain and evaluate response   Implement non-pharmacological measures as appropriate and evaluate response     Problem: Safety - Adult  Goal: Free from fall injury  Outcome: Progressing  Flowsheets (Taken 3/26/2023 0233)  Free From Fall Injury: Instruct family/caregiver on patient safety     Problem: Skin/Tissue Integrity  Goal: Absence of new skin breakdown  Description: 1. Monitor for areas of redness and/or skin breakdown  2. Assess vascular access sites hourly  3. Every 4-6 hours minimum:  Change oxygen saturation probe site  4. Every 4-6 hours:  If on nasal continuous positive airway pressure, respiratory therapy assess nares and determine need for appliance change or resting period. Outcome: Progressing  Note: Martin scale monitoring per protocol. Inspect skin for breakdown frequently. Encourage pt to make frequent large adjustments in position or assist patient with turning. Document all areas of breakdown.
Change oxygen saturation probe site  4. Every 4-6 hours:  If on nasal continuous positive airway pressure, respiratory therapy assess nares and determine need for appliance change or resting period. Outcome: Progressing  Note: Martin scale monitoring per protocol. Inspect skin for breakdown frequently. Encourage pt to make frequent large adjustments in position or assist patient with turning. Document all areas of breakdown.
Gabriella Scales, RN  Outcome: Progressing  Flowsheets (Taken 3/27/2023 0003)  Nursing Interventions for Elopement Risk: Assist with personal care needs such as toileting, eating, dressing, as needed to reduce the risk of wandering

## 2023-03-27 NOTE — PROGRESS NOTES
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 further needs at this time. Call light in reach.
Writer at bedside to complete evening assessment. Upon entry to room, pt awake and in bed, respirations normal and unlabored while on room air. Vitals obtained and assessment completed, see flow sheet for details. Pt denies needs from writer at this time. Call light in reach. Will continue to monitor.
Writer at bedside to complete evening assessment. Upon entry to room, pt awake and in bed, respirations normal and unlabored while on room air. Vitals obtained and assessment completed, see flow sheet for details. Pt denies needs from writer at this time. Call light in reach. Will continue to monitor.
Writer at bedside to complete second shift assessment. Assessment and vital signs completed, see flow sheet for details. Pt denies any needs at this time. Call light within reach. Will continue to monitor.
Writer at bedside to complete second shift assessment. Assessment and vital signs completed, see flow sheet for details. Pt denies any needs at this time. Call light within reach. Will continue to monitor.
Writer entered room to find patient vomiting into bowl. Small amount of emesis noted. Zofran administered at this time. Writer asked pt what happened that led to emesis episode to which pt stated \"I don't know. \" Pt expresses having nausea with vomiting at this time. Pt was eating breakfast prior to episode. Will continue to monitor.
Writer reviewed discharge instructions with patient. Patient aware of need to  prescriptions and aware of follow up appointments. Writer also reviewed need for repeat labs. Patient informed that [address] will do her dressing changes. Writer provided education in regards to Renal diet and Renal failure. Patient verbalized understanding. Denies questions. Copy of discharge instructions given to patient.
Writer to bedside to complete morning assessment. Upon entry to room, pt in bed awake, respirations even and unlabored while on room air. Vitals obtained and assessment completed, see flow sheet for details. Pt notified Dr. Lachelle Russell of pt's high blood pressure. Pt A&O x4. Pt denies any pain. Pt updated on plan of care. Pt denies further needs from writer at this time. Call light in reach. Care ongoing.
Writer to bedside to complete morning assessment. Upon entry to room, pt sitting up in bed, respirations even and unlabored while on room air. Vitals obtained and assessment completed Charisma Roy precepting with this nurse, see flow sheet for details. Dr Isela Sanchez and Hazel Padron CNP at bedside. Dressing to right posterior thigh removed, moderate amount drainage. Wound image taken, cleansed with bath wipe. Silver alginate and foam dressing applied. Pt tolerated change well. Pt denies needs from writer at this time. Call light in reach. Care ongoing.
Yoly Pedro M.D. Internal Medicine Progress Note    Patient: Amy Knox  Date of Admission: 3/22/2023  5:37 PM  Hospital Day # 4  Date of Evaluation: 3/26/2023      SUBJECTIVE:    Ms. Jose Jones  was seen and examined today for f/u of JOSÉ (acute kidney injury) ([facility] [address] Her BP's have still been running in the 180's to 200's despite increase in Hydralazine dose and initiation of Coreg yesterday and Cardura today. She was nauseous this AM and had an emesis. She is feeling a little better now, but just feels chilled. She denies any chest pain or SOB. She states she has personal issues at home to take care of before she gets her renal biopsy done, so this is being scheduled as an outpatient. ROS:   Constitutional: negative  for fevers, and negative for chills. Respiratory: negative for shortness of breath, negative for cough, and negative for wheezing  Cardiovascular: negative for chest pain, and negative for palpitations  Gastrointestinal: negative for abdominal pain, negative for nausea,negative for vomiting, negative for diarrhea, and negative for constipation     All other systems were reviewed with the patient and are negative unless otherwise stated in HPI    -----------------------------------------------------------------  OBJECTIVE:  Vitals:   Temp: 98.3 °F (36.8 °C)  BP: (!) 187/89  Resp: 18  Heart Rate: 86  SpO2: 95 % on room air    Weight  Wt Readings from Last 3 Encounters:   03/26/23 (!) 415 lb 5.6 oz (188.4 kg)   03/19/23 (!) 410 lb (186 kg)   11/30/22 (!) 406 lb (184.2 kg)     Body mass index is 67.04 kg/m². 24HR INTAKE/OUTPUT:      Intake/Output Summary (Last 24 hours) at 3/26/2023 1035  Last data filed at 3/26/2023 1025  Gross per 24 hour   Intake 1487 ml   Output 3750 ml   Net -2263 ml       Exam:  GEN:    Awake, alert and oriented x 3. EYES:  EOMI, pupils equal   NECK: Supple. No lymphadenopathy.   No carotid bruit  CVS:    regular rate and rhythm, no audible murmur  PULM:  CTA,
All fall risk precautions in place;Call light within reach; Left in bed;Bed alarm in place;Nurse notified       Goals  Short Term Goals  Time Frame for Short Term Goals: 20 days  Short Term Goal 1: Patient to complete all transfers with SUP and no LOB to decrease fall risk. Short Term Goal 2: Patient to ambulate 50ftx2 with SPC and SUP with no LOB or fatigue to improve endurance. Short Term Goal 3: Patient to tolerate 20-30 min of ther ex/act to improve functional strength. Short Term Goal 4: Patient to have F+ static standing balance to decrease fall risk.     Education  Patient Education  Education Given To: Patient  Education Provided: Role of Therapy;Plan of Care;Home Exercise Program  Education Method: Verbal;Demonstration  Barriers to Learning: None  Education Outcome: Verbalized understanding;Continued education needed    Therapy Time   Individual Concurrent Group Co-treatment   Time In 1302         Time Out 1325         Minutes [place], [place]
CTA, no wheezes, rales or rhonchi, no acute respiratory distress  ABD:    Bowels sounds normal.  Abdomen is soft. No distention. no tenderness to palpation. EXT:   no edema bilaterally . No calf tenderness. NEURO: Moves all extremities. Motor and sensory are grossly intact  SKIN:  No rashes.   No skin lesions.    -----------------------------------------------------------------  DATA:  Complete Blood Count:   Recent Labs     03/23/23  0558 03/24/23  0605 03/25/23  0940   WBC 7.5 7.0 8.5   RBC 3.19* 3.51* 3.44*   HGB 9.6* 10.5* 10.3*   HCT 30.0* 33.3* 32.1*   MCV 94.0 94.9 93.3   MCH 30.1 29.9 29.9   MCHC 32.0 31.5 32.1   RDW 14.0 14.2 14.1    269 255   MPV 10.1 9.9 9.9        Recent Blood Glucose levels:   Recent Labs     03/22/23  1312 03/23/23  0558 03/24/23  0605 03/25/23  0940   GLUCOSE 84 91 81 89        Comprehensive Metabolic Profile:   Recent Labs     03/23/23  0558 03/23/23  1556 03/24/23  0605 03/25/23  0940     --  138 139   K 4.6  --  4.8 4.4   *  --  104 105   CO2 18*  --  19* 23   BUN 24*  --  25* 23*   CREATININE 4.44*  --  4.35* 4.07*   GLUCOSE 91  --  81 89   CALCIUM 9.1  --  8.9 9.5   PROT 6.9 6.9 7.7 7.3   LABALBU 3.6  --  3.7 3.9   BILITOT 0.3  --  0.3 0.3   ALKPHOS 76  --  80 75   AST 11  --  15 11   ALT 8  --  6 7        Urinalysis:   Lab Results   Component Value Date/Time    NITRU NEGATIVE 03/22/2023 01:44 PM    COLORU Yellow 03/22/2023 01:44 PM    PHUR 6.0 03/22/2023 01:44 PM    WBCUA 0 TO 2 03/22/2023 01:44 PM    RBCUA 0 TO 2 03/22/2023 01:44 PM    YEAST PRESENCE NOTED 03/22/2023 01:44 PM    SPECGRAV 1.020 03/22/2023 01:44 PM    LEUKOCYTESUR NEGATIVE 03/22/2023 01:44 PM    UROBILINOGEN Normal 03/22/2023 01:44 PM    GLUCOSEU NEGATIVE 03/22/2023 01:44 PM         Lactic Acid:   Lab Results   Component Value Date/Time    LACTA 2.1 03/22/2023 06:10 PM          MEDICAL DECISION MAKING:  Primary Problem(s): Acute kidney injury vs subacute kidney injury superimposed on
Diagnosis: Weakness  REQUIRES OT FOLLOW-UP: Yes        Plan   Occupational Therapy Plan  Times Per Day: Once a day  Days Per Week: 7 Days  Current Treatment Recommendations: Strengthening, Balance training, ROM, Functional mobility training, Endurance training, Patient/Caregiver education & training, Equipment evaluation, education, & procurement, Safety education & training, Self-Care / ADL     Restrictions  Restrictions/Precautions  Restrictions/Precautions: General Precautions, Fall Risk    Subjective   Subjective  Subjective: Patient reports slight pain in BLE, low back and hips; states that she just had some pain medications. Social/Functional History  Social/Functional History  Lives With: Alone  Type of Home: Apartment  Home Layout: One level  Home Access: Level entry  Bathroom Shower/Tub: Tub/Shower unit  Bathroom Toilet: Standard  Bathroom Equipment: Shower chair, Grab bars in [address] [place] [address]: [facility]  Has the patient had two or more falls in the past year or any fall with injury in the past year?: No  ADL Assistance: [address] [place] [address] Avenue: Independent  Ambulation Assistance: Independent  Transfer Assistance: Independent  Active : Yes       Objective   Heart Rate: 73  Heart Rate Source: Monitor  BP: (!) 179/93  BP Location: Right lower arm  BP Method: Automatic  Patient Position: Semi fowlers  MAP (Calculated): 122  Resp: 18  SpO2: 98 %  O2 Device: None (Room air)             Safety Devices  Type of Devices: Call light within reach; Left in bed; All fall risk precautions in place  Balance  Sitting: Intact  Standing: With support  Gait  Overall Level of Assistance: Contact-guard assistance;Stand-by assistance;Assist X1  Assistive Device: Cane, straight  Toilet Transfers  Toilet - Technique: Ambulating  Equipment Used: Standard toilet  Toilet Transfer: Stand by assistance  AROM: Generally decreased, functional (limtied R shoulder d/t previous injury)  PROM: Generally decreased,
Impaired (pt stood LEEANNA supported leaning over sink during self care ~ 4 min)  Standing - Static: Good  Standing - Dynamic: Fair  Transfer Training  Transfer Training: Yes  Overall Level of Assistance: Assist X1;Stand-by assistance;Supervision; Additional time (sit<>stand from recliner, BSC, and EOB x2 each with LEEANNA support upon standing. Pt required 2 attempts at times d/t BLE weakness)    Gait Training  Gait Training: Yes  Gait  Overall Level of Assistance: Assist X1;Stand-by assistance;Supervision; Additional time (Pt utilized RW to/from restroom with 0 LOB and increased time d/t low endurance)  Interventions: Safety awareness training;Verbal cues       ADL  Grooming: Stand by assistance  Grooming Skilled Clinical Factors: seated  UE Bathing: Stand by assistance  UE Bathing Skilled Clinical Factors: seated  LE Bathing: Minimal assistance  LE Bathing Skilled Clinical Factors: seated/standing supported  UE Dressing: Stand by assistance  UE Dressing Skilled Clinical Factors: seated  LE Dressing: Minimal assistance  LE Dressing Skilled Clinical Factors: seated/standing supported  Pt required increased time and RBs for all act d/t low endurance. Safety Devices  Type of Devices: All fall risk precautions in place;Call light within reach; Left in bed;Bed alarm in place;Nurse notified     Patient Education  Education Given To: Patient  Education Provided: Role of Therapy;Plan of Care;Transfer Training; Fall Prevention Strategies; Equipment  Education Provided Comments: AE for LB ADLs  Education Method: Verbal  Barriers to Learning: None  Education Outcome: Verbalized understanding    Goals  Short Term Goals  Time Frame for Short Term Goals: 21 visits  Short Term Goal 1: Patient to be educated on AE/DME, d/c folder, and basic home safety to ensure safe and indep return home.   Short Term Goal 2: Patient to engage in 15 minutes of ther ex/ther act to improve strength and activity tolerance for ADL and IADL upon return
Requirements: Ideal  Protein (g/day): 71-88 (1.3-1.5g/kg)  Method Used for Fluid Requirements: 1 ml/kcal  Fluid (ml/day): 2060mL    Nutrition Diagnosis:   Inadequate oral intake related to altered GI function, renal dysfunction as evidenced by nausea, poor intake prior to admission, other (comment) (drinking excessive amount of fluids)      Recent Labs     03/25/23  0940 03/26/23  0535 03/27/23  0535    142 134*   K 4.4 4.5 4.7    107 102   CO2 23 23 20   BUN 23* 26* 30*   CREATININE 4.07* 4.22* 4.44*   GLUCOSE 89 88 89   ALT 7 7 9   ALKPHOS 75 70 64      Lab Results   Component Value Date/Time    LABALBU 3.4 03/27/2023 05:35 AM    LABALBU 5.0 05/31/2012 04:29 PM         Nutrition Interventions:   Food and/or Nutrient Delivery: Continue Current Diet  Nutrition Education/Counseling: Education initiated  Coordination of Nutrition Care: No recommendation at this time  Plan of Care discussed with: Patient    Goals:  Previous Goal Met: Progressing toward Goal(s)  Goals: Meet at least 75% of estimated needs       Nutrition Monitoring and Evaluation:   Behavioral-Environmental Outcomes: None Identified  Food/Nutrient Intake Outcomes: Food and Nutrient Intake  Physical Signs/Symptoms Outcomes: Biochemical Data, Weight    Discharge Planning:    No discharge needs at this time     [facility] [id number]
Stand-by assistance;Assist X1;Additional time (Pt limited by weakness, pt stood from EOB then immedietly needed to sit d/t weakness in BLEs, pt then able to stand and walk to restroom. Sit<>stand from toilet using L side rail)  Interventions: Verbal cues  Gait  Overall Level of Assistance: Stand-by assistance;Assist X1 (Pt utilized SC to walk to restroom, and RW to walk back. \"I feel more secure with the RW. \" Slow pace, 1 standing RB required each way d/t fatigue)  Assistive Device: Cane, straight     ADL    Toileting Skilled Clinical Factors: SBA overall for safety. pt able to manage clothing and hygiene with increased time and difficulty        Safety Devices  Type of Devices: All fall risk precautions in place;Call light within reach; Left in chair;Nurse notified     Patient Education  Education Given To: Patient  Education Provided: Role of Therapy;Plan of Care;Transfer Training; Fall Prevention Strategies  Education Method: Verbal  Barriers to Learning: None  Education Outcome: Verbalized understanding;Demonstrated understanding    Goals  Short Term Goals  Time Frame for Short Term Goals: 21 visits  Short Term Goal 1: Patient to be educated on AE/DME, d/c folder, and basic home safety to ensure safe and indep return home. Short Term Goal 2: Patient to engage in 15 minutes of ther ex/ther act to improve strength and activity tolerance for ADL and IADL upon return home. Short Term Goal 3: Patient to complete ADL routine c mod I to ensure safe return home alone.        Therapy Time   Individual Concurrent Group Co-treatment   Time In 1251         Time Out 1320         Minutes 29                 SAMANTA Morfin
Training: Yes  Overall Level of Assistance: Stand-by assistance;Assist X1  Interventions: Verbal cues; Safety awareness training  Sit to Stand: Stand-by assistance;Assist X1  Stand to Sit: Stand-by assistance;Assist X1  Stand Pivot Transfers: Assist X1;Supervision;Stand-by assistance; Additional time  Gait Training  Gait Training: Yes  Gait  Overall Level of Assistance: Stand-by assistance;Assist X1  Interventions: Safety awareness training;Verbal cues  Base of Support: Widened  Distance (ft): 15 Feet  Assistive Device: Walker;Cane, straight     PT Exercises  Exercise Treatment: Seated BLE in all planes x15     Safety Devices  Type of Devices: Call light within reach; Left in chair;Nurse notified       Goals  Short Term Goals  Time Frame for Short Term Goals: 20 days  Short Term Goal 1: Patient to complete all transfers with SUP and no LOB to decrease fall risk. Short Term Goal 2: Patient to ambulate 50ftx2 with SPC and SUP with no LOB or fatigue to improve endurance. Short Term Goal 3: Patient to tolerate 20-30 min of ther ex/act to improve functional strength. Short Term Goal 4: Patient to have F+ static standing balance to decrease fall risk.     Education  Patient Education  Education Given To: Patient  Education Provided: Role of Therapy;Plan of Care;Home Exercise Program  Education Provided Comments: proper exercise technique  Education Method: Verbal;Demonstration  Barriers to Learning: None  Education Outcome: Verbalized understanding;Continued education needed    Therapy Time   Individual Concurrent Group Co-treatment   Time In 0813         Time Out 0830         Minutes [address] [place], [place]
awareness training;Verbal cues  Supine to Sit: Assist X1;Supervision;Stand-by assistance  Sit to Supine: Stand-by assistance;Assist X1;Additional time  Balance  Sitting: Intact  Standing: Impaired  Standing - Static: Good  Standing - Dynamic: Fair  Transfer Training  Transfer Training: Yes  Overall Level of Assistance: Stand-by assistance;Assist X1  Interventions: Verbal cues; Safety awareness training  Sit to Stand: Stand-by assistance;Assist X1  Stand to Sit: Stand-by assistance;Assist X1  Stand Pivot Transfers: Assist X1;Supervision;Stand-by assistance; Additional time  Toilet Transfer: Assist X1;Supervision;Stand-by assistance; Additional time  Gait  Overall Level of Assistance: Stand-by assistance;Assist X1  Assistive Device: Walker     ADL  Grooming: Stand by assistance  Grooming Skilled Clinical Factors: Pt stood at sink to wash hands. Toileting: Stand by assistance  Toileting Skilled Clinical Factors: SBA to complete hygiene and clothing mgmt        Safety Devices  Type of Devices: Call light within reach;Nurse notified; Left in bed     Patient Education  Education Given To: Patient  Education Provided: Role of Therapy;Plan of Care;Transfer Training  Education Method: Verbal  Barriers to Learning: None  Education Outcome: Verbalized understanding    Goals  Short Term Goals  Time Frame for Short Term Goals: 21 visits  Short Term Goal 1: Patient to be educated on AE/DME, d/c folder, and basic home safety to ensure safe and indep return home. Short Term Goal 2: Patient to engage in 15 minutes of ther ex/ther act to improve strength and activity tolerance for ADL and IADL upon return home. Short Term Goal 3: Patient to complete ADL routine c mod I to ensure safe return home alone.        Therapy Time   Individual Concurrent Group Co-treatment   Time In 3146         Time Out 0854         Minutes 30                 JOSE Boo/MITRA
to Sit: Assist X1;Supervision;Stand-by assistance  Sit to Supine: Stand-by assistance;Assist X1;Additional time  Balance  Sitting: Intact  Standing: Impaired  Standing - Static: Good  Standing - Dynamic: Fair  Transfer Training  Transfer Training: Yes  Overall Level of Assistance: Stand-by assistance;Assist X1  Interventions: Verbal cues; Safety awareness training  Sit to Stand: Stand-by assistance;Assist X1  Stand to Sit: Stand-by assistance;Assist X1  Stand Pivot Transfers: Assist X1;Supervision;Stand-by assistance; Additional time  Toilet Transfer: Assist X1;Supervision;Stand-by assistance; Additional time  Gait  Overall Level of Assistance: Stand-by assistance;Assist X1  Assistive Device: Walker    Safety Devices  Type of Devices: Call light within reach;Nurse notified; Left in bed       Goals  Short Term Goals  Time Frame for Short Term Goals: 20 days  Short Term Goal 1: Patient to complete all transfers with SUP and no LOB to decrease fall risk. Short Term Goal 2: Patient to ambulate 50ftx2 with SPC and SUP with no LOB or fatigue to improve endurance. Short Term Goal 3: Patient to tolerate 20-30 min of ther ex/act to improve functional strength. Short Term Goal 4: Patient to have F+ static standing balance to decrease fall risk.     Education  Patient Education  Education Given To: Patient  Education Provided: Role of Therapy;Plan of Care;Home Exercise Program  Education Provided Comments: importance of keep walker close during transfers to reduce risk of falls  Education Method: Verbal;Demonstration  Barriers to Learning: None  Education Outcome: Verbalized understanding;Continued education needed    Therapy Time   Individual Concurrent Group Co-treatment   Time In 0825         Time Out 0854         Minutes [place], [place]
Intake Outcomes: Food and Nutrient Intake  Physical Signs/Symptoms Outcomes: Biochemical Data, Weight    Discharge Planning:     Too soon to determine     [address] [facility]: [id number]
Outcome: Verbalized understanding;Continued education needed    Therapy Time   Individual Concurrent Group Co-treatment   Time In 1120         Time Out 1147         Minutes [address] [place], [place]
Provided: Role of Therapy;Plan of Care;Home Exercise Program  Education Provided Comments: Educated pt on reducing hip hike when ambulating, educated to increase knee flexion.   Education Method: Verbal;Demonstration  Barriers to Learning: None  Education Outcome: Verbalized understanding;Continued education needed    Therapy Time   Individual Concurrent Group Co-treatment   Time In 1427         Time Out 1508         Minutes [facility]
and activity tolerance for ADL and IADL upon return home. Short Term Goal 3: Patient to complete ADL routine c mod I to ensure safe return home alone.        Therapy Time   Individual Concurrent Group Co-treatment   Time In 1120         Time Out 1200         Minutes 40                 JOSE Boo/MITRA
IS q1hr [place]       If Acuity Level is 2 or above in the following:    [] PULMONARY HISTORY (PULM HX)    Goal: Assist patient in quitting smoking to slow or stop the progression of lung disease.     [] Smoking Cessation Protocol    SMOKING CESSATION EDUCATION provided according to policy CE_759: (trev with an X)  ____Yes    ____ No     ____ NA    Smoking Cessation Booklet given:  ____Yes  ____No ____Patient Pallavi Dawkins
IS q1hr [place]       If Acuity Level is 2 or above in the following:    [] PULMONARY HISTORY (PULM HX)    Goal: Assist patient in quitting smoking to slow or stop the progression of lung disease.     [] Smoking Cessation Protocol    SMOKING CESSATION EDUCATION provided according to policy WD_189: (trev with an X)  ____Yes    ____ No     ____ NA    Smoking Cessation Booklet given:  ____Yes  ____No ____Patient Mirna Fontana
patient's Advance Care Plan is present, Code Status is documented, or surrogate decision maker is listed in the patient's medical record  [If \"yes\", STOP HERE]     [] The patient's Advance Care Plan is NOT present because:    []  I confirmed today that the patient does not wish or was not able to name a   surrogate decision maker or provide and advance care plan.    [] Hospice care is currently being provided or has been provided within the   calendar year. []  I did NOT confirm today the presence of an [address] or surrogate   decision maker documented within the patient's medical record.    [DOES NOT SATISFY ABRAM Ariza - CNP , ABRAM, NP-C  [facility]        3/27/2023, 8:47 AM

## 2023-03-27 NOTE — DISCHARGE INSTR - DIET

## 2023-03-28 ENCOUNTER — TELEPHONE (OUTPATIENT)
Dept: NEPHROLOGY | Age: 53
End: 2023-03-28

## 2023-03-28 LAB
ANTI JO-1 IGG: 0.4 U/ML
ANTI-CENTROMERE: 1.1 U/ML
ENA SCL70 IGG SER IA-ACNC: 1.4 U/ML
ENA SM IGG SER-ACNC: 2.7 U/ML
ENA SS-A IGG SER QL: >240 U/ML
ENA SS-B IGG SER IA-ACNC: 0.5 U/ML
U1 SNRNP IGG SER IA-ACNC: 0.7 U/ML
U1 SNRNP IGG SER IA-ACNC: 1.2 U/ML

## 2023-03-28 NOTE — TELEPHONE ENCOUNTER
Pt phoned states she has an apt with you in [place] on 4-10 her biopsy is scheduled for 4-7 - is this ok with you?

## 2023-03-29 ENCOUNTER — TELEPHONE (OUTPATIENT)
Dept: NEPHROLOGY | Age: 53
End: 2023-03-29

## 2023-03-29 DIAGNOSIS — R76.8 SS-A ANTIBODY POSITIVE: Primary | ICD-10-CM

## 2023-03-29 DIAGNOSIS — R76.8 POSITIVE ANA (ANTINUCLEAR ANTIBODY): ICD-10-CM

## 2023-03-29 NOTE — TELEPHONE ENCOUNTER
----- Message from Woody Dobbs MD sent at 3/28/2023  6:41 PM EDT -----  Please refer her to Rheumatology  Dx: Elevated MARCELINO and Anti SSA

## 2023-03-29 NOTE — TELEPHONE ENCOUNTER
Spoke to pt, she understands that she will be seeing a rheumatologist, due to abnormal labs.     Referral pending

## 2023-03-30 ENCOUNTER — HOSPITAL ENCOUNTER (OUTPATIENT)
Age: 53
Discharge: HOME | End: 2023-03-30
Payer: MEDICAID

## 2023-03-30 DIAGNOSIS — N17.9 ACUTE RENAL FAILURE, UNSPECIFIED ACUTE RENAL FAILURE TYPE (HCC): ICD-10-CM

## 2023-03-30 LAB
ABSOLUTE EOS #: 0.5 K/UL (ref 0–0.44)
ABSOLUTE IMMATURE GRANULOCYTE: <0.03 K/UL (ref 0–0.3)
ABSOLUTE LYMPH #: 1.7 K/UL (ref 1.1–3.7)
ABSOLUTE MONO #: 0.56 K/UL (ref 0.1–1.2)
ANION GAP SERPL CALCULATED.3IONS-SCNC: 15 MMOL/L (ref 9–17)
BASOPHILS # BLD: 1 % (ref 0–2)
BASOPHILS ABSOLUTE: 0.06 K/UL (ref 0–0.2)
BUN SERPL-MCNC: 37 MG/DL (ref 6–20)
BUN/CREAT BLD: 7 (ref 9–20)
CALCIUM SERPL-MCNC: 9.6 MG/DL (ref 8.6–10.4)
CHLORIDE SERPL-SCNC: 102 MMOL/L (ref 98–107)
CO2 SERPL-SCNC: 21 MMOL/L (ref 20–31)
CREAT SERPL-MCNC: 5.14 MG/DL (ref 0.5–0.9)
EOSINOPHILS RELATIVE PERCENT: 8 % (ref 1–4)
GFR SERPL CREATININE-BSD FRML MDRD: 10 ML/MIN/1.73M2
GLUCOSE SERPL-MCNC: 90 MG/DL (ref 70–99)
HCT VFR BLD AUTO: 33.8 % (ref 36.3–47.1)
HGB BLD-MCNC: 10.6 G/DL (ref 11.9–15.1)
IMMATURE GRANULOCYTES: 0 %
LYMPHOCYTES # BLD: 25 % (ref 24–43)
MCH RBC QN AUTO: 30 PG (ref 25.2–33.5)
MCHC RBC AUTO-ENTMCNC: 31.4 G/DL (ref 28.4–34.8)
MCV RBC AUTO: 95.8 FL (ref 82.6–102.9)
MONOCYTES # BLD: 8 % (ref 3–12)
NRBC AUTOMATED: 0 PER 100 WBC
PDW BLD-RTO: 14.1 % (ref 11.8–14.4)
PLATELET # BLD AUTO: 257 K/UL (ref 138–453)
PMV BLD AUTO: 10.8 FL (ref 8.1–13.5)
POTASSIUM SERPL-SCNC: 4.5 MMOL/L (ref 3.7–5.3)
RBC # BLD: 3.53 M/UL (ref 3.95–5.11)
SEG NEUTROPHILS: 58 % (ref 36–65)
SEGMENTED NEUTROPHILS ABSOLUTE COUNT: 3.86 K/UL (ref 1.5–8.1)
SODIUM SERPL-SCNC: 138 MMOL/L (ref 135–144)
WBC # BLD AUTO: 6.7 K/UL (ref 3.5–11.3)

## 2023-03-30 PROCEDURE — 36415 COLL VENOUS BLD VENIPUNCTURE: CPT

## 2023-03-30 PROCEDURE — 80048 BASIC METABOLIC PNL TOTAL CA: CPT

## 2023-03-30 PROCEDURE — 85025 COMPLETE CBC W/AUTO DIFF WBC: CPT

## 2023-03-31 ENCOUNTER — TELEPHONE (OUTPATIENT)
Dept: NEPHROLOGY | Age: 53
End: 2023-03-31

## 2023-03-31 DIAGNOSIS — R79.89 ELEVATED SERUM CREATININE: Primary | ICD-10-CM

## 2023-03-31 NOTE — TELEPHONE ENCOUNTER
----- Message from Eve Vargas MD sent at 3/31/2023 12:01 PM EDT -----  Pls have her do BMP on Monday morning, I am seeing her in afternoon.

## 2023-04-01 LAB
FOLATE SERPL-MCNC: 6.6 NG/ML
VIT B12 SERPL-MCNC: 527 PG/ML (ref 232–1245)

## 2023-04-03 ENCOUNTER — TELEPHONE (OUTPATIENT)
Dept: NEPHROLOGY | Age: 53
End: 2023-04-03

## 2023-04-03 ENCOUNTER — HOSPITAL ENCOUNTER (OUTPATIENT)
Age: 53
Discharge: HOME | End: 2023-04-03
Payer: MEDICAID

## 2023-04-03 ENCOUNTER — OFFICE VISIT (OUTPATIENT)
Dept: NEPHROLOGY | Age: 53
End: 2023-04-03
Payer: MEDICAID

## 2023-04-03 VITALS
DIASTOLIC BLOOD PRESSURE: 84 MMHG | BODY MASS INDEX: 66.18 KG/M2 | SYSTOLIC BLOOD PRESSURE: 173 MMHG | WEIGHT: 293 LBS | OXYGEN SATURATION: 96 % | HEART RATE: 70 BPM

## 2023-04-03 DIAGNOSIS — E87.20 METABOLIC ACIDOSIS: ICD-10-CM

## 2023-04-03 DIAGNOSIS — R79.89 ELEVATED SERUM CREATININE: ICD-10-CM

## 2023-04-03 DIAGNOSIS — N18.5 CKD (CHRONIC KIDNEY DISEASE) STAGE 5, GFR LESS THAN 15 ML/MIN (HCC): Primary | ICD-10-CM

## 2023-04-03 DIAGNOSIS — I12.9 HYPERTENSIVE RENAL DISEASE, STAGE 1 THROUGH STAGE 4 OR UNSPECIFIED CHRONIC KIDNEY DISEASE: ICD-10-CM

## 2023-04-03 DIAGNOSIS — K90.9 IRON MALABSORPTION: ICD-10-CM

## 2023-04-03 DIAGNOSIS — D64.9 ANEMIA, UNSPECIFIED TYPE: ICD-10-CM

## 2023-04-03 LAB
ABSOLUTE EOS #: <0.03 K/UL (ref 0–0.44)
ABSOLUTE IMMATURE GRANULOCYTE: <0.03 K/UL (ref 0–0.3)
ABSOLUTE LYMPH #: 0.64 K/UL (ref 1.1–3.7)
ABSOLUTE MONO #: 0.48 K/UL (ref 0.1–1.2)
ANION GAP SERPL CALCULATED.3IONS-SCNC: 14 MMOL/L (ref 9–17)
BASOPHILS # BLD: 0 % (ref 0–2)
BASOPHILS ABSOLUTE: 0.03 K/UL (ref 0–0.2)
BUN SERPL-MCNC: 40 MG/DL (ref 6–20)
BUN/CREAT BLD: 7 (ref 9–20)
CALCIUM SERPL-MCNC: 9.7 MG/DL (ref 8.6–10.4)
CHLORIDE SERPL-SCNC: 104 MMOL/L (ref 98–107)
CO2 SERPL-SCNC: 19 MMOL/L (ref 20–31)
CREAT SERPL-MCNC: 5.47 MG/DL (ref 0.5–0.9)
EOSINOPHILS RELATIVE PERCENT: 0 % (ref 1–4)
FERRITIN SERPL-MCNC: 344 NG/ML (ref 13–150)
GFR SERPL CREATININE-BSD FRML MDRD: 9 ML/MIN/1.73M2
GLUCOSE SERPL-MCNC: 108 MG/DL (ref 70–99)
HCT VFR BLD AUTO: 34.1 % (ref 36.3–47.1)
HGB BLD-MCNC: 10.7 G/DL (ref 11.9–15.1)
IMMATURE GRANULOCYTES: 0 %
IRON SATURATION: 16 % (ref 20–55)
IRON SERPL-MCNC: 37 UG/DL (ref 37–145)
LYMPHOCYTES # BLD: 9 % (ref 24–43)
MCH RBC QN AUTO: 30.7 PG (ref 25.2–33.5)
MCHC RBC AUTO-ENTMCNC: 31.4 G/DL (ref 28.4–34.8)
MCV RBC AUTO: 97.7 FL (ref 82.6–102.9)
MONOCYTES # BLD: 7 % (ref 3–12)
NRBC AUTOMATED: 0 PER 100 WBC
PDW BLD-RTO: 14.2 % (ref 11.8–14.4)
PLATELET # BLD AUTO: 273 K/UL (ref 138–453)
PMV BLD AUTO: 10.9 FL (ref 8.1–13.5)
POTASSIUM SERPL-SCNC: 4.8 MMOL/L (ref 3.7–5.3)
RBC # BLD: 3.49 M/UL (ref 3.95–5.11)
SEG NEUTROPHILS: 84 % (ref 36–65)
SEGMENTED NEUTROPHILS ABSOLUTE COUNT: 6.22 K/UL (ref 1.5–8.1)
SODIUM SERPL-SCNC: 137 MMOL/L (ref 135–144)
TIBC SERPL-MCNC: 227 UG/DL (ref 250–450)
UNSATURATED IRON BINDING CAPACITY: 190 UG/DL (ref 112–347)
WBC # BLD AUTO: 7.4 K/UL (ref 3.5–11.3)

## 2023-04-03 PROCEDURE — 3017F COLORECTAL CA SCREEN DOC REV: CPT | Performed by: INTERNAL MEDICINE

## 2023-04-03 PROCEDURE — 3079F DIAST BP 80-89 MM HG: CPT | Performed by: INTERNAL MEDICINE

## 2023-04-03 PROCEDURE — 83540 ASSAY OF IRON: CPT

## 2023-04-03 PROCEDURE — 85025 COMPLETE CBC W/AUTO DIFF WBC: CPT

## 2023-04-03 PROCEDURE — 83550 IRON BINDING TEST: CPT

## 2023-04-03 PROCEDURE — 3077F SYST BP >= 140 MM HG: CPT | Performed by: INTERNAL MEDICINE

## 2023-04-03 PROCEDURE — 80048 BASIC METABOLIC PNL TOTAL CA: CPT

## 2023-04-03 PROCEDURE — 1111F DSCHRG MED/CURRENT MED MERGE: CPT | Performed by: INTERNAL MEDICINE

## 2023-04-03 PROCEDURE — 82728 ASSAY OF FERRITIN: CPT

## 2023-04-03 PROCEDURE — 99213 OFFICE O/P EST LOW 20 MIN: CPT | Performed by: INTERNAL MEDICINE

## 2023-04-03 PROCEDURE — 1036F TOBACCO NON-USER: CPT | Performed by: INTERNAL MEDICINE

## 2023-04-03 PROCEDURE — 36415 COLL VENOUS BLD VENIPUNCTURE: CPT

## 2023-04-03 PROCEDURE — G8417 CALC BMI ABV UP PARAM F/U: HCPCS | Performed by: INTERNAL MEDICINE

## 2023-04-03 PROCEDURE — G8427 DOCREV CUR MEDS BY ELIG CLIN: HCPCS | Performed by: INTERNAL MEDICINE

## 2023-04-03 RX ORDER — SODIUM BICARBONATE 650 MG/1
1300 TABLET ORAL 3 TIMES DAILY
Qty: 180 TABLET | Refills: 3 | Status: SHIPPED | OUTPATIENT
Start: 2023-04-03

## 2023-04-03 NOTE — PROGRESS NOTES
See telephone encounter today regarding creat.
mg into the muscle as needed Use as directed for allergic reaction (Patient not taking: Reported on 11/22/2022)      Calcium Citrate-Vitamin D (CALCIUM CITRATE + D PO) Take 500 mg by mouth 3 times daily (Patient not taking: Reported on 4/3/2023)       No current facility-administered medications for this visit. Laboratory & Diagnostics:  Old labs  Anti SSA >240  ANCA/GBM/Hep C (-)  SPEP: ok  R 7.9, L 10.2 cm  April 2023: K 4.8, Creat 5.47, Glucose 108, Ca 9.7, bicarbonate 19     Impression/Plan:   1. CKD V; unclear etiology. Needs biopsy. Creat was 1.0 in Nov 2021. ?NSAId nephropathy vs HTN nephrosclerosis vs other causes. She is strongly advised to avoid NSAIDs. Unfortunately she is taking NSAId. She says she took it this morning as well. Additionaly, SSA is positive. She is scheduled to see rheumatology in 2 weeks. Indication for kidney biopsy discussed. She is aware. R/B/A were all discussed and she is in agreement. She also understands need for possible HD in near future. She has no uremic symptoms. Discussed in detail. 2. HTN: on cardura/coreg/norvasc and hydralazine. Add clonidine 0.1 mg BID if needed. 3. Obesity/hx weight loss surgery. 4. Hx NSAID intake\" Strongly advised cessation  5. Anemia in CKD  6. Lymphedema  7. MARCELINO and SSA positive. Will be seeing Rheumatology in 2 weeks. 8. Met acidosis: start sodium bicarbonate    Orders Placed This Encounter   Procedures    Basic Metabolic Panel    Hemoglobin and Hematocrit    PTH, Intact    Hemoglobin and Hematocrit    Protein / creatinine ratio, urine    Urinalysis    Anti-DNA Antibody, Double-Stranded    Phosphorus     Return in about 4 weeks (around 5/1/2023).     Marquise Gorman MD  [facility]

## 2023-04-04 ENCOUNTER — TELEPHONE (OUTPATIENT)
Dept: NEPHROLOGY | Age: 53
End: 2023-04-04

## 2023-04-05 ENCOUNTER — HOSPITAL ENCOUNTER (OUTPATIENT)
Age: 53
Discharge: HOME | End: 2023-04-05
Payer: MEDICAID

## 2023-04-05 ENCOUNTER — OFFICE VISIT (OUTPATIENT)
Dept: ONCOLOGY | Age: 53
End: 2023-04-05
Payer: MEDICAID

## 2023-04-05 ENCOUNTER — HOSPITAL ENCOUNTER (OUTPATIENT)
Age: 53
Setting detail: SPECIMEN
Discharge: HOME | End: 2023-04-05
Payer: MEDICAID

## 2023-04-05 ENCOUNTER — OFFICE VISIT (OUTPATIENT)
Dept: CARDIOLOGY | Age: 53
End: 2023-04-05
Payer: MEDICAID

## 2023-04-05 VITALS
BODY MASS INDEX: 47.09 KG/M2 | WEIGHT: 293 LBS | RESPIRATION RATE: 18 BRPM | OXYGEN SATURATION: 96 % | DIASTOLIC BLOOD PRESSURE: 71 MMHG | SYSTOLIC BLOOD PRESSURE: 115 MMHG | HEART RATE: 67 BPM | HEIGHT: 66 IN

## 2023-04-05 VITALS
SYSTOLIC BLOOD PRESSURE: 138 MMHG | HEART RATE: 67 BPM | BODY MASS INDEX: 47.09 KG/M2 | WEIGHT: 293 LBS | TEMPERATURE: 97 F | DIASTOLIC BLOOD PRESSURE: 77 MMHG | RESPIRATION RATE: 18 BRPM | HEIGHT: 66 IN

## 2023-04-05 DIAGNOSIS — E78.2 MIXED HYPERLIPIDEMIA: ICD-10-CM

## 2023-04-05 DIAGNOSIS — N18.5 ANEMIA IN STAGE 5 CHRONIC KIDNEY DISEASE, NOT ON CHRONIC DIALYSIS (HCC): ICD-10-CM

## 2023-04-05 DIAGNOSIS — I10 ESSENTIAL HYPERTENSION: Primary | ICD-10-CM

## 2023-04-05 DIAGNOSIS — D63.1 ANEMIA IN STAGE 5 CHRONIC KIDNEY DISEASE, NOT ON CHRONIC DIALYSIS (HCC): ICD-10-CM

## 2023-04-05 DIAGNOSIS — N18.5 CKD (CHRONIC KIDNEY DISEASE) STAGE 5, GFR LESS THAN 15 ML/MIN (HCC): ICD-10-CM

## 2023-04-05 DIAGNOSIS — E87.20 METABOLIC ACIDOSIS: ICD-10-CM

## 2023-04-05 DIAGNOSIS — I12.9 HYPERTENSIVE RENAL DISEASE, STAGE 1 THROUGH STAGE 4 OR UNSPECIFIED CHRONIC KIDNEY DISEASE: ICD-10-CM

## 2023-04-05 DIAGNOSIS — D50.9 IRON DEFICIENCY ANEMIA, UNSPECIFIED IRON DEFICIENCY ANEMIA TYPE: Primary | ICD-10-CM

## 2023-04-05 LAB
ANION GAP SERPL CALCULATED.3IONS-SCNC: 14 MMOL/L (ref 9–17)
BACTERIA: ABNORMAL
BILIRUBIN URINE: NEGATIVE
BUN SERPL-MCNC: 48 MG/DL (ref 6–20)
BUN/CREAT BLD: 9 (ref 9–20)
CALCIUM SERPL-MCNC: 9.1 MG/DL (ref 8.6–10.4)
CHLORIDE SERPL-SCNC: 107 MMOL/L (ref 98–107)
CO2 SERPL-SCNC: 19 MMOL/L (ref 20–31)
COLOR: YELLOW
CREAT SERPL-MCNC: 5.63 MG/DL (ref 0.5–0.9)
CREATININE URINE: 134.4 MG/DL (ref 28–217)
EPITHELIAL CELLS UA: ABNORMAL /HPF (ref 0–25)
GFR SERPL CREATININE-BSD FRML MDRD: 9 ML/MIN/1.73M2
GLUCOSE SERPL-MCNC: 97 MG/DL (ref 70–99)
GLUCOSE UR STRIP.AUTO-MCNC: NEGATIVE MG/DL
HCT VFR BLD AUTO: 33.8 % (ref 36.3–47.1)
HGB BLD-MCNC: 10.6 G/DL (ref 11.9–15.1)
KETONES UR STRIP.AUTO-MCNC: NEGATIVE MG/DL
LEUKOCYTE ESTERASE UR QL STRIP.AUTO: NEGATIVE
MUCUS: ABNORMAL
NITRITE UR QL STRIP.AUTO: NEGATIVE
PHOSPHATE SERPL-MCNC: 3.9 MG/DL (ref 2.6–4.5)
POTASSIUM SERPL-SCNC: 4.2 MMOL/L (ref 3.7–5.3)
PROT UR STRIP.AUTO-MCNC: 6 MG/DL (ref 5–9)
PROT UR STRIP.AUTO-MCNC: ABNORMAL MG/DL
PTH-INTACT SERPL-MCNC: 231.4 PG/ML (ref 14–72)
RBC CLUMPS #/AREA URNS AUTO: ABNORMAL /HPF (ref 0–2)
SODIUM SERPL-SCNC: 140 MMOL/L (ref 135–144)
SPECIFIC GRAVITY UA: 1.02 (ref 1.01–1.02)
TOTAL PROTEIN, URINE: 155 MG/DL
TURBIDITY: ABNORMAL
URINE HGB: NEGATIVE
URINE TOTAL PROTEIN CREATININE RATIO: 1.15 (ref 0–0.2)
UROBILINOGEN, URINE: NORMAL
WBC UA: ABNORMAL /HPF (ref 0–5)
YEAST: ABNORMAL

## 2023-04-05 PROCEDURE — 1036F TOBACCO NON-USER: CPT | Performed by: INTERNAL MEDICINE

## 2023-04-05 PROCEDURE — 85018 HEMOGLOBIN: CPT

## 2023-04-05 PROCEDURE — 84100 ASSAY OF PHOSPHORUS: CPT

## 2023-04-05 PROCEDURE — 84156 ASSAY OF PROTEIN URINE: CPT

## 2023-04-05 PROCEDURE — G8417 CALC BMI ABV UP PARAM F/U: HCPCS | Performed by: PHYSICIAN ASSISTANT

## 2023-04-05 PROCEDURE — 99213 OFFICE O/P EST LOW 20 MIN: CPT | Performed by: INTERNAL MEDICINE

## 2023-04-05 PROCEDURE — 81001 URINALYSIS AUTO W/SCOPE: CPT

## 2023-04-05 PROCEDURE — 82570 ASSAY OF URINE CREATININE: CPT

## 2023-04-05 PROCEDURE — 85014 HEMATOCRIT: CPT

## 2023-04-05 PROCEDURE — 99214 OFFICE O/P EST MOD 30 MIN: CPT | Performed by: PHYSICIAN ASSISTANT

## 2023-04-05 PROCEDURE — 36415 COLL VENOUS BLD VENIPUNCTURE: CPT

## 2023-04-05 PROCEDURE — G8427 DOCREV CUR MEDS BY ELIG CLIN: HCPCS | Performed by: PHYSICIAN ASSISTANT

## 2023-04-05 PROCEDURE — 3017F COLORECTAL CA SCREEN DOC REV: CPT | Performed by: INTERNAL MEDICINE

## 2023-04-05 PROCEDURE — 80048 BASIC METABOLIC PNL TOTAL CA: CPT

## 2023-04-05 PROCEDURE — 3074F SYST BP LT 130 MM HG: CPT | Performed by: PHYSICIAN ASSISTANT

## 2023-04-05 PROCEDURE — 1036F TOBACCO NON-USER: CPT | Performed by: PHYSICIAN ASSISTANT

## 2023-04-05 PROCEDURE — 3078F DIAST BP <80 MM HG: CPT | Performed by: PHYSICIAN ASSISTANT

## 2023-04-05 PROCEDURE — 1111F DSCHRG MED/CURRENT MED MERGE: CPT | Performed by: PHYSICIAN ASSISTANT

## 2023-04-05 PROCEDURE — 1111F DSCHRG MED/CURRENT MED MERGE: CPT | Performed by: INTERNAL MEDICINE

## 2023-04-05 PROCEDURE — G8428 CUR MEDS NOT DOCUMENT: HCPCS | Performed by: INTERNAL MEDICINE

## 2023-04-05 PROCEDURE — 3074F SYST BP LT 130 MM HG: CPT | Performed by: INTERNAL MEDICINE

## 2023-04-05 PROCEDURE — 3017F COLORECTAL CA SCREEN DOC REV: CPT | Performed by: PHYSICIAN ASSISTANT

## 2023-04-05 PROCEDURE — 3078F DIAST BP <80 MM HG: CPT | Performed by: INTERNAL MEDICINE

## 2023-04-05 PROCEDURE — 83970 ASSAY OF PARATHORMONE: CPT

## 2023-04-05 PROCEDURE — 86225 DNA ANTIBODY NATIVE: CPT

## 2023-04-05 PROCEDURE — G8417 CALC BMI ABV UP PARAM F/U: HCPCS | Performed by: INTERNAL MEDICINE

## 2023-04-05 RX ORDER — ONDANSETRON 4 MG/1
TABLET, ORALLY DISINTEGRATING ORAL
COMMUNITY
Start: 2023-03-24

## 2023-04-05 NOTE — PROGRESS NOTES
control. Finally, I recommended that she continue her current medications and follow up with you as previously scheduled. I discussed patient's symptoms and treatment plan with Dr Patsy Lora, he was in agreement with the plan and follow up. FOLLOW UP:   I told Ms. Chandan Maldonado to call my office if she had any problems, but otherwise I asked her to Return in about 3 months (around 7/5/2023). However, I would be happy to see her sooner should the need arise. Sincerely,  Courtney Stone PA-C  [facility] Cardiology Specialist     [facility], [address] [place] [address]  Phone: [phone], Fax: [phone]     I believe that the risk of significant morbidity and mortality related to the patient's current medical conditions are: intermediate-high. Approximately 40 minutes were spent during prep work, discussion and exam of the patient, and follow up documentation and all of their questions were answered.        April 6, 2023

## 2023-04-05 NOTE — PATIENT INSTRUCTIONS
SURVEY:    You may be receiving a survey from [employer] regarding your visit today. Please complete the survey to enable us to provide the highest quality of care to you and your family. If you cannot score us a very good on any question, please call the office to discuss how we could have made your experience a very good one. Thank you.

## 2023-04-06 ENCOUNTER — TELEPHONE (OUTPATIENT)
Dept: NEPHROLOGY | Age: 53
End: 2023-04-06

## 2023-04-06 DIAGNOSIS — N18.5 CKD (CHRONIC KIDNEY DISEASE) STAGE 5, GFR LESS THAN 15 ML/MIN (HCC): Primary | ICD-10-CM

## 2023-04-06 LAB — DSDNA IGG SER QL IA: 0.8 IU/ML

## 2023-04-06 NOTE — TELEPHONE ENCOUNTER
Patient called asking about taking her blood pressure medicine prior to her biopsy if she can't eat or drink anything. I told her that if she was instructed to take her medicines that she could drink just enough water to get them down. She also said her hematologist told her they received a message from us regarding a \"shot\" we wanted patient to have but the hematologist told her they would rather wait to give it to her after her biopsy is done. She states she knew nothing about a shot and was wondering what it was for.

## 2023-04-06 NOTE — TELEPHONE ENCOUNTER
Spoke to pt, she understood about the injection and when she will need it. She also understood that she might need it in the future, but not right now.

## 2023-04-06 NOTE — TELEPHONE ENCOUNTER
When kidneys slow down, then will need injection to bring hemoglobin up. Her hemoglobin levels have been around 10.5 to 10.7 so at this time she does not need it but will need in the future.

## 2023-04-06 NOTE — TELEPHONE ENCOUNTER
----- Message from Jacob Yoder MD sent at 4/5/2023  5:07 PM EDT -----  Please have patient do BMP 1 week.

## 2023-04-06 NOTE — TELEPHONE ENCOUNTER
Pt is aware that she is to repeat a BMP next week. She will get it done at [facility] on Wednesday.

## 2023-04-07 ENCOUNTER — HOSPITAL ENCOUNTER (OUTPATIENT)
Dept: CT IMAGING | Age: 53
Discharge: HOME | End: 2023-04-07
Payer: MEDICAID

## 2023-04-07 VITALS
SYSTOLIC BLOOD PRESSURE: 160 MMHG | HEART RATE: 73 BPM | DIASTOLIC BLOOD PRESSURE: 75 MMHG | RESPIRATION RATE: 16 BRPM | TEMPERATURE: 97.9 F | WEIGHT: 293 LBS | OXYGEN SATURATION: 97 % | BODY MASS INDEX: 65.37 KG/M2

## 2023-04-07 DIAGNOSIS — R79.89 ELEVATED SERUM CREATININE: ICD-10-CM

## 2023-04-07 LAB
APTT PPP: 24.3 SECONDS (ref 22–38)
CREAT SERPL-MCNC: 5.1 MG/DL (ref 0.4–1.2)
DEPRECATED RDW RBC AUTO: 50.4 FL (ref 35–45)
ERYTHROCYTE [DISTWIDTH] IN BLOOD BY AUTOMATED COUNT: 13.9 % (ref 11.5–14.5)
GFR SERPL CREATININE-BSD FRML MDRD: 10 ML/MIN/1.73M2
HCT VFR BLD AUTO: 32.3 % (ref 37–47)
HGB BLD-MCNC: 9.7 GM/DL (ref 12–16)
INR PPP: 0.92 (ref 0.85–1.13)
MCH RBC QN AUTO: 29.8 PG (ref 26–33)
MCHC RBC AUTO-ENTMCNC: 30 GM/DL (ref 32.2–35.5)
MCV RBC AUTO: 99.4 FL (ref 81–99)
PLATELET # BLD AUTO: 265 THOU/MM3 (ref 130–400)
PMV BLD AUTO: 10.6 FL (ref 9.4–12.4)
RBC # BLD AUTO: 3.25 MILL/MM3 (ref 4.2–5.4)
WBC # BLD AUTO: 7.2 THOU/MM3 (ref 4.8–10.8)

## 2023-04-07 PROCEDURE — 50200 RENAL BIOPSY PERQ: CPT

## 2023-04-07 PROCEDURE — 6360000002 HC RX W HCPCS: Performed by: RADIOLOGY

## 2023-04-07 PROCEDURE — 2580000003 HC RX 258: Performed by: RADIOLOGY

## 2023-04-07 PROCEDURE — 77012 CT SCAN FOR NEEDLE BIOPSY: CPT

## 2023-04-07 RX ORDER — MIDAZOLAM HYDROCHLORIDE 1 MG/ML
INJECTION INTRAMUSCULAR; INTRAVENOUS PRN
Status: COMPLETED | OUTPATIENT
Start: 2023-04-07 | End: 2023-04-07

## 2023-04-07 RX ORDER — FENTANYL CITRATE 50 UG/ML
INJECTION, SOLUTION INTRAMUSCULAR; INTRAVENOUS PRN
Status: COMPLETED | OUTPATIENT
Start: 2023-04-07 | End: 2023-04-07

## 2023-04-07 RX ORDER — SODIUM CHLORIDE 450 MG/100ML
INJECTION, SOLUTION INTRAVENOUS CONTINUOUS
Status: DISCONTINUED | OUTPATIENT
Start: 2023-04-07 | End: 2023-04-08 | Stop reason: HOSPADM

## 2023-04-07 RX ADMIN — FENTANYL CITRATE 50 MCG: 50 INJECTION, SOLUTION INTRAMUSCULAR; INTRAVENOUS at 08:52

## 2023-04-07 RX ADMIN — FENTANYL CITRATE 50 MCG: 50 INJECTION, SOLUTION INTRAMUSCULAR; INTRAVENOUS at 08:59

## 2023-04-07 RX ADMIN — SODIUM CHLORIDE: 4.5 INJECTION, SOLUTION INTRAVENOUS at 07:44

## 2023-04-07 RX ADMIN — MIDAZOLAM 1 MG: 1 INJECTION INTRAMUSCULAR; INTRAVENOUS at 08:48

## 2023-04-07 RX ADMIN — MIDAZOLAM 1 MG: 1 INJECTION INTRAMUSCULAR; INTRAVENOUS at 08:59

## 2023-04-07 RX ADMIN — FENTANYL CITRATE 50 MCG: 50 INJECTION, SOLUTION INTRAMUSCULAR; INTRAVENOUS at 08:48

## 2023-04-07 RX ADMIN — MIDAZOLAM 1 MG: 1 INJECTION INTRAMUSCULAR; INTRAVENOUS at 08:52

## 2023-04-07 RX ADMIN — FENTANYL CITRATE 50 MCG: 50 INJECTION, SOLUTION INTRAMUSCULAR; INTRAVENOUS at 09:05

## 2023-04-07 RX ADMIN — MIDAZOLAM 1 MG: 1 INJECTION INTRAMUSCULAR; INTRAVENOUS at 09:05

## 2023-04-07 ASSESSMENT — PAIN SCALES - GENERAL
PAINLEVEL_OUTOF10: 2
PAINLEVEL_OUTOF10: 1
PAINLEVEL_OUTOF10: 3
PAINLEVEL_OUTOF10: 1
PAINLEVEL_OUTOF10: 3

## 2023-04-07 NOTE — DISCHARGE INSTRUCTIONS
POST BIOPSY DISCHARGE INSTRUCTION SHEET    DIET:  As tolerated    ACTIVITY:  Rest at home on sofa, bed or recliner today. Bathroom privileges only today. Limit any exertion (pushing or pulling) today. No lifting for 3 days. No driving today. Check biopsy site frequently today. Resume any blood thinners in 24 hours. Keep band aid clean & dry - replace as needed, may remove in 48 hours. RETURN TO NEAREST EMERGENCY ROOM IF YOU HAVE ANY OF THE FOLLOWING:  Sign of bleeding, swelling, drainage from biopsy site or severe pain (slight discomfort to be expected) around biopsy site. Repeated nausea/vomiting/abdominal pain. Elevated temperature above 101 degrees. Shortness of breath. Chest pain. Keep scheduled appointment with your physician. If sedation given, follow post sedation instruction sheet.      _ SEDATION/ANALGESIA INFORMATION HOME GOING ADVICE    Review the following information with the patient prior to the procedure. Sedation/agalgesia is used during short medical procedures under controlled supervision. The medication will produce a strong relaxation. You will be able to hear, speak and follow instructions, but you memory and alertness will be decreased. You will be able to swallow and breathe on your own. During sedation/analgesia you blood pressure, hear and breathing will be watched closely. After the procedure, you may not remember what was said or done. Procedure:      Date:  You may have the following effects from the medication. Drowsiness, dizziness, sleepiness or confusion. Difficulty remembering or delayed reaction times. Loss of fine muscle control or difficulty with your balance especially while walking. Difficulty focusing or blurred vision. You may not be aware of slight changes in your behavior and/or your reaction time because of the medication used during the procedure. Therefore you should follow these instructions.   Have someone responsible help you with your

## 2023-04-07 NOTE — OP NOTE
Department of Radiology  Post Procedure Progress Note      Pre-Procedure Diagnosis:  Renal failure    Procedure Performed:  CT guided left kidney biopsy    Anesthesia: local / versed and fentanyl    Findings: successful    Immediate Complications:  None    Estimated Blood Loss: minimal    SEE DICTATED PROCEDURE NOTE FOR COMPLETE DETAILS.     Electronically signed by Joel Sutton MD on 4/7/2023 at 9:50 AM

## 2023-04-07 NOTE — PROGRESS NOTES
0813 Pt in specials radiology for  retropubic abcess aspiration. Discussed procedure with pt and pt verbalizes understanding. 2496 Dr Kelsy Veloz to speak to pt.  1224 Pt positioned on table and attached to monitor. Pre biopsy scans taken. 0603 Dr Ninoska Martinez to start procedure. 4614 Four core biopsy samples obtained and sent to lab. Discussed with pt awaiting call from lab. Pt verbalizes understanding. 8629 Call from lab received. Needle removed and pressure to site till bleeding stopped. 0930 Triple antibiotic ointment applied to site on left lower back with band-aid. Site without redness, swelling or hematoma. 0935 Pt positioned on cart for comfort. C/O right shoulder pain from lying on stomach. Transferred to OPN per cart. Report called to Catrachito Livingston.

## 2023-04-07 NOTE — PROGRESS NOTES
0700  Luis Manuel Moise was wheeled into room via wheelchair for renal bx. Pt rights and responsibilities offered to her. Procedure explained and questions were answered. Princess Hodges is pt friend that is her  today. Iv started and labs were drawn and sent to lab. She states she is not on blood thinners. She has been NPO for over 4 hours. Call light within reach with no other needs at this time. [address] was picked up for procedure. Princess Hodges went down with Luis Manuel Moise. Cedric Bingham returned from procedure. Band aid intact, site soft. She states she has a little discomfort but no issues at this time. She was offered drink and snack but does not want anything. Remedios at bedside. 1000  no changes at this time. Luis Manuel Moise is resting comfortably she states. No issues at this time. 1015  band aid intact, site soft. Luis Manuel Moise states just a little discomfort still but is okay and comfortable. Her head of bed was adjusted. Call light within reach. 1030  band aid intact site soft. No needs at this time. Call light within reach. Pain is controlled and Luis Manuel Moise was repositioned for comfort. 1045  no changes at this time. Luis Manuel Moise is resting on stretcher with no issues at this time. Band aid intact site soft. 200  slick has call light within reach, she was wheeled to restroom via wheelchair and did well. No issues, no signs of bleeding, vitals stable. Friend at bedside. 1145  Luis Manuel Moise has no needs and no changes at this time. [address] [facility] 600 has no needs at this time. Call light within reach. She states she is comfortable and was brought warm blankets. Site is soft and band aid intact. 1345 no changes and Luis Manuel Moise states she feels good no pain and is ready for d/c. D/c instructions explained and Luis Manuel Moise and Princess Hodges verbalized understanding.  Luis Manuel Moise was wheeled out via wheelchair for d/c.       _m___ Safety:       (Environmental)  [place] to environment  Ensure ID band is correct and in place/ allergy band as needed  Assess for fall risk  Initiate

## 2023-04-07 NOTE — H&P
therapy use last 5 days  [x]No []Yes  Other anticoagulant use last 5 days  [x]No []Yes    Current Outpatient Medications:     ondansetron (ZOFRAN-ODT) 4 MG disintegrating tablet, dissolve 1 tablet ON TONGUE every 8 hours if needed for nausea OR vomiting, Disp: , Rfl:     sodium bicarbonate 650 MG tablet, Take 2 tablets by mouth 3 times daily (Patient taking differently: Take 0.5 tablets by mouth 3 times daily), Disp: 180 tablet, Rfl: 3    doxazosin (CARDURA) 4 MG tablet, Take 1 tablet by mouth in the morning and at bedtime, Disp: 30 tablet, Rfl: 3    hydrALAZINE (APRESOLINE) 100 MG tablet, Take 1 tablet by mouth every 8 hours, Disp: 90 tablet, Rfl: 3    carvedilol (COREG) 25 MG tablet, Take 1 tablet by mouth 2 times daily (with meals), Disp: 60 tablet, Rfl: 3    bumetanide (BUMEX) 1 MG tablet, Take 1 tablet by mouth 2 times daily, Disp: 30 tablet, Rfl: 3    tiZANidine (ZANAFLEX) 4 MG tablet, Take 1 tablet by mouth every 8 hours as needed (spasms), Disp: 30 tablet, Rfl: 0    mometasone-formoterol (DULERA) 200-5 MCG/ACT inhaler, Inhale 2 puffs into the lungs in the morning and 2 puffs in the evening., Disp: 1 each, Rfl: 11    albuterol sulfate HFA (VENTOLIN HFA) 108 (90 Base) MCG/ACT inhaler, Inhale 2 puffs into the lungs 4 times daily as needed for Wheezing, Disp: 54 g, Rfl: 11    montelukast (SINGULAIR) 10 MG tablet, Take 1 tablet by mouth nightly, Disp: 30 tablet, Rfl: 11    amLODIPine (NORVASC) 10 MG tablet, take 1 tablet by mouth once daily, Disp: , Rfl:     EPINEPHrine (EPIPEN) 0.3 MG/0.3ML SOAJ injection, Inject 0.3 mLs into the muscle as needed Use as directed for allergic reaction, Disp: , Rfl:     atorvastatin (LIPITOR) 20 MG tablet, Take 1 tablet by mouth daily, Disp: 30 tablet, Rfl: 1    pantoprazole (PROTONIX) 40 MG tablet, take 1 tablet by mouth once daily, Disp: 30 tablet, Rfl: 3    fluticasone (FLONASE) 50 MCG/ACT nasal spray, 2 sprays by Nasal route daily, Disp: 16 g, Rfl: 11    docusate sodium

## 2023-04-24 ENCOUNTER — OFFICE VISIT (OUTPATIENT)
Dept: GASTROENTEROLOGY | Age: 53
End: 2023-04-24
Payer: MEDICAID

## 2023-04-24 VITALS
WEIGHT: 293 LBS | HEIGHT: 66 IN | DIASTOLIC BLOOD PRESSURE: 87 MMHG | TEMPERATURE: 98.6 F | SYSTOLIC BLOOD PRESSURE: 149 MMHG | HEART RATE: 69 BPM | BODY MASS INDEX: 47.09 KG/M2

## 2023-04-24 DIAGNOSIS — Z12.11 COLON CANCER SCREENING: Primary | ICD-10-CM

## 2023-04-24 PROCEDURE — 3078F DIAST BP <80 MM HG: CPT | Performed by: INTERNAL MEDICINE

## 2023-04-24 PROCEDURE — 1111F DSCHRG MED/CURRENT MED MERGE: CPT | Performed by: INTERNAL MEDICINE

## 2023-04-24 PROCEDURE — G8427 DOCREV CUR MEDS BY ELIG CLIN: HCPCS | Performed by: INTERNAL MEDICINE

## 2023-04-24 PROCEDURE — 1036F TOBACCO NON-USER: CPT | Performed by: INTERNAL MEDICINE

## 2023-04-24 PROCEDURE — G8417 CALC BMI ABV UP PARAM F/U: HCPCS | Performed by: INTERNAL MEDICINE

## 2023-04-24 PROCEDURE — 99202 OFFICE O/P NEW SF 15 MIN: CPT | Performed by: INTERNAL MEDICINE

## 2023-04-24 PROCEDURE — 3017F COLORECTAL CA SCREEN DOC REV: CPT | Performed by: INTERNAL MEDICINE

## 2023-04-24 PROCEDURE — 3074F SYST BP LT 130 MM HG: CPT | Performed by: INTERNAL MEDICINE

## 2023-04-24 RX ORDER — POLYETHYLENE GLYCOL 3350, SODIUM CHLORIDE, SODIUM BICARBONATE, POTASSIUM CHLORIDE 420; 11.2; 5.72; 1.48 G/4L; G/4L; G/4L; G/4L
4000 POWDER, FOR SOLUTION ORAL ONCE
Qty: 4000 ML | Refills: 0 | Status: SHIPPED | OUTPATIENT
Start: 2023-04-24 | End: 2023-04-24

## 2023-05-04 ENCOUNTER — HOSPITAL ENCOUNTER (OUTPATIENT)
Age: 53
Discharge: HOME | End: 2023-05-04
Payer: MEDICAID

## 2023-05-04 DIAGNOSIS — N18.5 CKD (CHRONIC KIDNEY DISEASE) STAGE 5, GFR LESS THAN 15 ML/MIN (HCC): ICD-10-CM

## 2023-05-04 LAB
ANION GAP SERPL CALCULATED.3IONS-SCNC: 14 MMOL/L (ref 9–17)
BUN SERPL-MCNC: 65 MG/DL (ref 6–20)
BUN/CREAT BLD: 15 (ref 9–20)
CALCIUM SERPL-MCNC: 9.6 MG/DL (ref 8.6–10.4)
CHLORIDE SERPL-SCNC: 101 MMOL/L (ref 98–107)
CO2 SERPL-SCNC: 24 MMOL/L (ref 20–31)
CREAT SERPL-MCNC: 4.3 MG/DL (ref 0.5–0.9)
GFR SERPL CREATININE-BSD FRML MDRD: 12 ML/MIN/1.73M2
GLUCOSE SERPL-MCNC: 96 MG/DL (ref 70–99)
HCT VFR BLD AUTO: 33.7 % (ref 36.3–47.1)
HGB BLD-MCNC: 10.3 G/DL (ref 11.9–15.1)
POTASSIUM SERPL-SCNC: 4.1 MMOL/L (ref 3.7–5.3)
SODIUM SERPL-SCNC: 139 MMOL/L (ref 135–144)

## 2023-05-04 PROCEDURE — 85018 HEMOGLOBIN: CPT

## 2023-05-04 PROCEDURE — 80048 BASIC METABOLIC PNL TOTAL CA: CPT

## 2023-05-04 PROCEDURE — 36415 COLL VENOUS BLD VENIPUNCTURE: CPT

## 2023-05-04 PROCEDURE — 85014 HEMATOCRIT: CPT

## 2023-05-05 ENCOUNTER — HOSPITAL ENCOUNTER (OUTPATIENT)
Dept: VASCULAR LAB | Age: 53
End: 2023-05-05
Payer: MEDICAID

## 2023-05-05 DIAGNOSIS — Z01.810 PREOPERATIVE VASCULAR EXAMINATION: ICD-10-CM

## 2023-05-05 PROCEDURE — 93970 EXTREMITY STUDY: CPT

## 2023-05-08 ENCOUNTER — OFFICE VISIT (OUTPATIENT)
Dept: NEPHROLOGY | Age: 53
End: 2023-05-08
Payer: MEDICAID

## 2023-05-08 VITALS
BODY MASS INDEX: 62.62 KG/M2 | DIASTOLIC BLOOD PRESSURE: 71 MMHG | WEIGHT: 293 LBS | HEART RATE: 65 BPM | OXYGEN SATURATION: 98 % | SYSTOLIC BLOOD PRESSURE: 133 MMHG

## 2023-05-08 DIAGNOSIS — N18.5 ANEMIA IN STAGE 5 CHRONIC KIDNEY DISEASE, NOT ON CHRONIC DIALYSIS (HCC): ICD-10-CM

## 2023-05-08 DIAGNOSIS — N18.5 CKD (CHRONIC KIDNEY DISEASE) STAGE 5, GFR LESS THAN 15 ML/MIN (HCC): Primary | ICD-10-CM

## 2023-05-08 DIAGNOSIS — I12.9 HYPERTENSIVE RENAL DISEASE, STAGE 1 THROUGH STAGE 4 OR UNSPECIFIED CHRONIC KIDNEY DISEASE: ICD-10-CM

## 2023-05-08 DIAGNOSIS — D63.1 ANEMIA IN STAGE 5 CHRONIC KIDNEY DISEASE, NOT ON CHRONIC DIALYSIS (HCC): ICD-10-CM

## 2023-05-08 PROCEDURE — G8417 CALC BMI ABV UP PARAM F/U: HCPCS | Performed by: INTERNAL MEDICINE

## 2023-05-08 PROCEDURE — 3017F COLORECTAL CA SCREEN DOC REV: CPT | Performed by: INTERNAL MEDICINE

## 2023-05-08 PROCEDURE — 1036F TOBACCO NON-USER: CPT | Performed by: INTERNAL MEDICINE

## 2023-05-08 PROCEDURE — 3078F DIAST BP <80 MM HG: CPT | Performed by: INTERNAL MEDICINE

## 2023-05-08 PROCEDURE — 3075F SYST BP GE 130 - 139MM HG: CPT | Performed by: INTERNAL MEDICINE

## 2023-05-08 PROCEDURE — 99213 OFFICE O/P EST LOW 20 MIN: CPT | Performed by: INTERNAL MEDICINE

## 2023-05-08 PROCEDURE — G8427 DOCREV CUR MEDS BY ELIG CLIN: HCPCS | Performed by: INTERNAL MEDICINE

## 2023-05-08 NOTE — PROGRESS NOTES
Vein mapping done Aman Garza    She is seeing a Dietician on [facility]
11    silver sulfADIAZINE (SILVADENE) 1 % cream Apply  topically 2 times daily as needed. Apply topically daily. 100 g 1     No current facility-administered medications for this visit. Laboratory & Diagnostics:  Old labs  Anti SSA >240  ANCA/GBM/Hep C (-)  SPEP: ok  R 7.9, L 10.2 cm  April 2023: K 4.8, Creat 5.47, Glucose 108, Ca 9.7, bicarbonate 19, UPCR 1.15 g/g  May 2023: K 4.1, Creat 4.3, Ca 9.6, Glucose 96, Hb 10.3,        Impression/Plan:   1. CKD V; HTN nephrosclerosis. Biopys reviewed with patient. Creat is high but stable. She is strongly advised to avoid NSAIDs. Vein mapping done. Pt is having a lot social issues with her living situation. She says she is having apartments building remodeled so she is going to be living at another place temporarily. She is overwhelmed at this time and she does not want to put a fistula yet. She has no uremic symptoms. Lytes are stable. She understands that fistula will take about 6-8 weeks before it can be used. Will need TDC when dialysis is needed. R/B/A were all discussed. Patient met with renal modality educator. We will continue to monitor renal function closely. Repeat labs in 3 weeks. 2. Anemia in CKD: monitor H/H, SILVERIO if hemoglobin drops below 10  3. HTN: on cardura/coreg/norvasc and hydralazine, advised patient to bring some blood pressure readings. Advised weight loss, low-salt diet. 4. Obesity/hx weight loss surgery. 5. Hx NSAID intake: Strongly advised cessation  6. Lymphedema  7. MARCELINO and SSA positive. Will be seeing Rheumatology in 2 weeks. She was referred to rheumatology but she did not keep her last appointment -she was rescheduled for later this month. 8. Met acidosis: on sodium bicarbonate. Orders Placed This Encounter   Procedures    Basic Metabolic Panel    Hemoglobin and Hematocrit    PTH, Intact    Phosphorus    Vitamin D 25 Hydroxy     Return in about 5 weeks (around 6/12/2023).   We will plan to see her in [place]

## 2023-05-11 ENCOUNTER — HOSPITAL ENCOUNTER (OUTPATIENT)
Dept: NUTRITION | Age: 53
Discharge: HOME | End: 2023-05-11
Payer: MEDICAID

## 2023-05-11 PROCEDURE — 97802 MEDICAL NUTRITION INDIV IN: CPT

## 2023-05-11 NOTE — PROGRESS NOTES
MNT provided for Obesity and CKD without dialysis    Food and nutrition-related knowledge deficit related to renal dysfunction as evidenced by Food recall    Client data    Ht: 66\" Wt: 388#  BMI: 63.36 (obese III)   Weight changes: unknown CBW: 176 kg   BMR: 2410 calories Est. total calorie needs: ~1600     Client goal is for weight loss towards a healthier BMI. Current eating pattern includes use of protein shakes, smoothies, salads, microwave meals or fast foods when out to appointments. States has been aware of sodium in diet for some time with her lymphedema, but uses \"salts\" still via \"pink salt\" or \"sea salts\". States primary reason for visit is for clarification of the RENAL restrictions (sodium, potassium and phosphorous). Does primarily drink water but may use carrot juice or her protein drinks (has been using r/t lower gastric capacity and had been recommended by  when going to gym). She has read a lot from internet, and different providers and family members have all weighed in on what she should be doing, hence this visit. Activity is low currently. Oral fluid intakes appear adequate. Client presents for MNT today with self. Discussed and provided literature on:  Sodium: 2000 mg limit daily, Potassium, 2000 mg daily and lower phosphorous foods (no specific limit provided, not on a phos binder). Discussed recommendations and either provided support for information she has received previously, dispelled it, or put in context specifically for her as a NON-diabetic. Client goals: Measure foods and read food labels for serving sizes    Limit meat servings to 3 oz (size of palm of hand)    Follow handout recommendations to make grocery lists    Avoid eating out    Plan day for appointments and pack items that will better follow recommendations. Continue to mostly drink more water.     Schedule yourself time to exercise (minimum of 30 minutes daily in whole or combination)    Look up

## 2023-05-22 ENCOUNTER — HOSPITAL ENCOUNTER (EMERGENCY)
Age: 53
Discharge: HOME | End: 2023-05-22
Attending: EMERGENCY MEDICINE
Payer: MEDICAID

## 2023-05-22 VITALS
HEART RATE: 79 BPM | DIASTOLIC BLOOD PRESSURE: 79 MMHG | SYSTOLIC BLOOD PRESSURE: 152 MMHG | OXYGEN SATURATION: 93 % | HEIGHT: 66 IN | RESPIRATION RATE: 18 BRPM | BODY MASS INDEX: 47.09 KG/M2 | TEMPERATURE: 98.2 F | WEIGHT: 293 LBS

## 2023-05-22 DIAGNOSIS — L98.491 SKIN ULCER, LIMITED TO BREAKDOWN OF SKIN (HCC): Primary | ICD-10-CM

## 2023-05-22 PROCEDURE — 99282 EMERGENCY DEPT VISIT SF MDM: CPT

## 2023-05-22 ASSESSMENT — LIFESTYLE VARIABLES
HOW OFTEN DO YOU HAVE A DRINK CONTAINING ALCOHOL: NEVER
HOW MANY STANDARD DRINKS CONTAINING ALCOHOL DO YOU HAVE ON A TYPICAL DAY: PATIENT DOES NOT DRINK

## 2023-05-22 ASSESSMENT — PAIN DESCRIPTION - DESCRIPTORS: DESCRIPTORS: TENDER

## 2023-05-22 ASSESSMENT — PAIN DESCRIPTION - LOCATION: LOCATION: LEG

## 2023-05-22 ASSESSMENT — PAIN DESCRIPTION - ORIENTATION: ORIENTATION: RIGHT

## 2023-05-22 ASSESSMENT — PAIN - FUNCTIONAL ASSESSMENT: PAIN_FUNCTIONAL_ASSESSMENT: 0-10

## 2023-05-22 ASSESSMENT — PAIN SCALES - GENERAL: PAINLEVEL_OUTOF10: 7

## 2023-05-22 ASSESSMENT — PAIN DESCRIPTION - PAIN TYPE: TYPE: ACUTE PAIN

## 2023-05-22 NOTE — ED PROVIDER NOTES
her current activities. Patient would benefit from a long-term dressing. And did locate a Exuderm satin hydrocolloid wound dressing and applied it. I did discuss with the patient that this can be left on for 7 days. She can follow-up with her PCP for additional wound check evaluation, and patient was also provided with an additional dressing to apply again. She can also follow-up with her primary care doctor regarding getting home health services restarted. FINAL IMPRESSION      1.  Skin ulcer, limited to breakdown of skin [facility])          DISPOSITION / PLAN     DISPOSITION Decision To Discharge 05/22/2023 02:49:28 AM      PATIENT REFERRED TO:  AB[facility] Villatoro - [facility]  [address] [place] [address]  [phone]    Schedule an appointment as soon as possible for a visit   For wound re-check      DISCHARGE MEDICATIONS:  New Prescriptions    No medications on file       Winston Carlos DO  Emergency Medicine Resident    (Please note that portions of thisnote were completed with a voice recognition program.  Efforts were made to edit the dictations but occasionally words are mis-transcribed.)        Heather Schlatter, DO  05/22/23 1248

## 2023-05-22 NOTE — DISCHARGE INSTRUCTIONS
Please keep wound dressing in place, follow-up with your primary care provider for wound recheck, and reapply wound dressing that was provided. Return to ER for additional concern increased pain or increased swelling.

## 2023-05-29 ASSESSMENT — ENCOUNTER SYMPTOMS: BACK PAIN: 1

## 2023-05-30 ENCOUNTER — OFFICE VISIT (OUTPATIENT)
Dept: RHEUMATOLOGY | Age: 53
End: 2023-05-30
Payer: MEDICAID

## 2023-05-30 VITALS
WEIGHT: 293 LBS | DIASTOLIC BLOOD PRESSURE: 74 MMHG | BODY MASS INDEX: 47.09 KG/M2 | SYSTOLIC BLOOD PRESSURE: 136 MMHG | OXYGEN SATURATION: 95 % | HEIGHT: 66 IN | HEART RATE: 76 BPM

## 2023-05-30 DIAGNOSIS — R76.8 SS-A ANTIBODY POSITIVE: ICD-10-CM

## 2023-05-30 DIAGNOSIS — N18.4 CKD (CHRONIC KIDNEY DISEASE) STAGE 4, GFR 15-29 ML/MIN (HCC): ICD-10-CM

## 2023-05-30 DIAGNOSIS — M17.0 PRIMARY OSTEOARTHRITIS OF BOTH KNEES: Primary | ICD-10-CM

## 2023-05-30 PROCEDURE — 1036F TOBACCO NON-USER: CPT | Performed by: INTERNAL MEDICINE

## 2023-05-30 PROCEDURE — 3075F SYST BP GE 130 - 139MM HG: CPT | Performed by: INTERNAL MEDICINE

## 2023-05-30 PROCEDURE — 3017F COLORECTAL CA SCREEN DOC REV: CPT | Performed by: INTERNAL MEDICINE

## 2023-05-30 PROCEDURE — 3078F DIAST BP <80 MM HG: CPT | Performed by: INTERNAL MEDICINE

## 2023-05-30 PROCEDURE — G8427 DOCREV CUR MEDS BY ELIG CLIN: HCPCS | Performed by: INTERNAL MEDICINE

## 2023-05-30 PROCEDURE — G8417 CALC BMI ABV UP PARAM F/U: HCPCS | Performed by: INTERNAL MEDICINE

## 2023-05-30 PROCEDURE — 99204 OFFICE O/P NEW MOD 45 MIN: CPT | Performed by: INTERNAL MEDICINE

## 2023-05-30 RX ORDER — HYDROCODONE BITARTRATE AND ACETAMINOPHEN 5; 325 MG/1; MG/1
1 TABLET ORAL 2 TIMES DAILY PRN
Qty: 14 TABLET | Refills: 0 | Status: SHIPPED | OUTPATIENT
Start: 2023-05-30 | End: 2023-06-06

## 2023-05-30 ASSESSMENT — ENCOUNTER SYMPTOMS
SHORTNESS OF BREATH: 0
VOMITING: 0
COUGH: 0
NAUSEA: 0
ABDOMINAL PAIN: 0
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
## 2023-05-31 ENCOUNTER — TELEPHONE (OUTPATIENT)
Dept: RHEUMATOLOGY | Age: 53
End: 2023-05-31

## 2023-05-31 DIAGNOSIS — N18.4 CKD (CHRONIC KIDNEY DISEASE) STAGE 4, GFR 15-29 ML/MIN (HCC): ICD-10-CM

## 2023-05-31 DIAGNOSIS — M17.0 PRIMARY OSTEOARTHRITIS OF BOTH KNEES: Primary | ICD-10-CM

## 2023-05-31 NOTE — TELEPHONE ENCOUNTER
Patient was here yesterday for an appointment and a referral came out for Pain Clinic, when I looked up the provider it looks like he is from [place], not sure if that is accurate and if so patient stated that she could not travel that far. Please advise.

## 2023-05-31 NOTE — TELEPHONE ENCOUNTER
Referral is for pain management in [facility] in [facility]. I've placed a new order in.

## 2023-06-02 ENCOUNTER — HOSPITAL ENCOUNTER (OUTPATIENT)
Age: 53
Discharge: HOME | End: 2023-06-02
Payer: MEDICAID

## 2023-06-02 LAB
ANION GAP SERPL CALCULATED.3IONS-SCNC: 15 MMOL/L (ref 9–17)
BUN SERPL-MCNC: 25 MG/DL (ref 6–20)
BUN/CREAT SERPL: 6 (ref 9–20)
CALCIUM SERPL-MCNC: 9.9 MG/DL (ref 8.6–10.4)
CHLORIDE SERPL-SCNC: 104 MMOL/L (ref 98–107)
CO2 SERPL-SCNC: 21 MMOL/L (ref 20–31)
CREAT SERPL-MCNC: 4.33 MG/DL (ref 0.5–0.9)
GFR SERPL CREATININE-BSD FRML MDRD: 12 ML/MIN/1.73M2
GLUCOSE SERPL-MCNC: 86 MG/DL (ref 70–99)
HCT VFR BLD AUTO: 34.1 % (ref 36.3–47.1)
HGB BLD-MCNC: 10.6 G/DL (ref 11.9–15.1)
PHOSPHATE SERPL-MCNC: 3.1 MG/DL (ref 2.6–4.5)
POTASSIUM SERPL-SCNC: 3.9 MMOL/L (ref 3.7–5.3)
SODIUM SERPL-SCNC: 140 MMOL/L (ref 135–144)

## 2023-06-02 PROCEDURE — 85014 HEMATOCRIT: CPT

## 2023-06-02 PROCEDURE — 82306 VITAMIN D 25 HYDROXY: CPT

## 2023-06-02 PROCEDURE — 36415 COLL VENOUS BLD VENIPUNCTURE: CPT

## 2023-06-02 PROCEDURE — 85018 HEMOGLOBIN: CPT

## 2023-06-02 PROCEDURE — 83970 ASSAY OF PARATHORMONE: CPT

## 2023-06-02 PROCEDURE — 80048 BASIC METABOLIC PNL TOTAL CA: CPT

## 2023-06-02 PROCEDURE — 84100 ASSAY OF PHOSPHORUS: CPT

## 2023-06-03 LAB
25(OH)D3 SERPL-MCNC: 34.7 NG/ML
PTH-INTACT SERPL-MCNC: 168.3 PG/ML (ref 14–72)

## 2023-06-20 ENCOUNTER — OFFICE VISIT (OUTPATIENT)
Dept: NEPHROLOGY | Age: 53
End: 2023-06-20
Payer: MEDICAID

## 2023-06-20 VITALS
SYSTOLIC BLOOD PRESSURE: 151 MMHG | TEMPERATURE: 96.8 F | BODY MASS INDEX: 47.09 KG/M2 | DIASTOLIC BLOOD PRESSURE: 73 MMHG | WEIGHT: 293 LBS | HEART RATE: 70 BPM | RESPIRATION RATE: 16 BRPM | HEIGHT: 66 IN

## 2023-06-20 DIAGNOSIS — N18.5 ANEMIA IN STAGE 5 CHRONIC KIDNEY DISEASE, NOT ON CHRONIC DIALYSIS (HCC): ICD-10-CM

## 2023-06-20 DIAGNOSIS — N18.5 CKD (CHRONIC KIDNEY DISEASE) STAGE 5, GFR LESS THAN 15 ML/MIN (HCC): Primary | ICD-10-CM

## 2023-06-20 DIAGNOSIS — I12.9 HYPERTENSIVE RENAL DISEASE, STAGE 1 THROUGH STAGE 4 OR UNSPECIFIED CHRONIC KIDNEY DISEASE: ICD-10-CM

## 2023-06-20 DIAGNOSIS — D63.1 ANEMIA IN STAGE 5 CHRONIC KIDNEY DISEASE, NOT ON CHRONIC DIALYSIS (HCC): ICD-10-CM

## 2023-06-20 PROCEDURE — 99213 OFFICE O/P EST LOW 20 MIN: CPT | Performed by: INTERNAL MEDICINE

## 2023-06-20 PROCEDURE — 1036F TOBACCO NON-USER: CPT | Performed by: INTERNAL MEDICINE

## 2023-06-20 PROCEDURE — G8427 DOCREV CUR MEDS BY ELIG CLIN: HCPCS | Performed by: INTERNAL MEDICINE

## 2023-06-20 PROCEDURE — 3077F SYST BP >= 140 MM HG: CPT | Performed by: INTERNAL MEDICINE

## 2023-06-20 PROCEDURE — 3078F DIAST BP <80 MM HG: CPT | Performed by: INTERNAL MEDICINE

## 2023-06-20 PROCEDURE — G8417 CALC BMI ABV UP PARAM F/U: HCPCS | Performed by: INTERNAL MEDICINE

## 2023-06-20 PROCEDURE — 3017F COLORECTAL CA SCREEN DOC REV: CPT | Performed by: INTERNAL MEDICINE

## 2023-06-20 RX ORDER — ATORVASTATIN CALCIUM 80 MG/1
80 TABLET, FILM COATED ORAL NIGHTLY
COMMUNITY
Start: 2023-05-27

## 2023-06-20 NOTE — PATIENT INSTRUCTIONS
SURVEY:    You may be receiving a survey from [employer] regarding your visit today. Please complete the survey to enable us to provide the highest quality of care to you and your family. If you cannot score us a very good on any question, please call the office to discuss how we could have made your experience a very good one. Thank you. Please recheck your blood pressure when you go home and make sure you take your prescribed medication if applicable . Please follow up with your PCP or ER if needed.

## 2023-06-20 NOTE — PROGRESS NOTES
Phone: [phone]                 [facility]           Fax: [phone]                           Outpatient Physical Therapy                                                                            Daily Note    Patient: Yvette Walters : 1970  [facility] #: [id number]   Referring Practitioner:  Dr. Carolina Baxter     Referral Date : 19     Date: 2019    Diagnosis: Lymphedema I89.0  Treatment Diagnosis: BLE lymphedema     Onset Date: 19  PT Insurance Information: [facility]  Total # of Visits Approved: 18 Per Physician Order  Total # of Visits to Date: 10  No Show: 0  Canceled Appointment: 0      Pre-Treatment Pain:  0/10  Subjective: Pt reports she feels more swollen today     Exercises:  Exercise 1: pt educated on keeping legs elevated, compressed and performing ankle pumps daily     Manual:  Other: MLD to B LE       Assessment  Assessment: Pt tolerated treatment well     Patient Education  Patient Education: increasing compression   Pt verbalized/demonstrated good understanding:     [x] Yes         [] No, pt required further clarification.     Post Treatment Pain: 0/10      Plan  Times per week: 2x/wk   Plan weeks: 4-6 weeks       Goals  (Total # of Visits to Date: 10)   Short Term Goals - Time Frame for Short term goals: 3 weeks    Short term goal 1: Pt will be educated on her POC and HEP-MET                                        []Met   []Partially met  []Not met   Short term goal 2: Pt will initiate pump protocol in order to reduce BLE lymphedema -met  []Met   []Partially met  []Not met      []Met   []Partially met  []Not met      []Met []Partially met  []Not met     Long Term Goals - Time Frame for Long term goals : 6 weeks  Long term goal 1: Pt will be safe and independent with compression, elevation and exercises-MET []Met  []Partially met  []Not met   Long term goal 2: Pt will decrease BLe edema by 5-7cm in order to reduce difficulty with ambulation []Met  []Partially met  []Not met   Long term goal 3: Pt will report 50% improvement in symptoms []Met  []Partially met  []Not met   Long term goal 4: Pt will be fitted for pump and compression garament to maintain edema at home -MET []Met  []Partially met  []Not met     []Met  []Partially met  []Not met       Minutes Tracking:  Time In: 1326  Time Out: 1430  Minutes: Ashu Rodriguez PT, DPT      Date: 6/25/2019 No

## 2023-06-20 NOTE — PROGRESS NOTES
[facility] KIDNEY AND HYPERTENSION  [address] [facility] [address]  Dept: [phone]  Office Progress Note  6/20/2023 12:45 PM      Pt Name:    Sanam Rebolledo:    1970  Primary Care Physician:  Jeaneth Bynum, ABRAM - CNP     Chief Complaint:   Chief Complaint   Patient presents with    Chronic Kidney Disease     F/u visit. Reports being tired today. Labs done. Background Information/Interval History:   Patient is 48 yo pleasant AAF whom I am seeing for first time in office. I saw her last week at [facility] where she was admitted with elevated creatinine. Her creat used to run in 1.1 range until 2021. But in Nov 2022 her creat was over 4. She has chronic lymphedema. [address] morbid obesity weight loss surgery in 2016, HTN. She has taken alevel, advil in the past. She has also been taking relafen 500 mg BID x 5 years. May 8, 2023: Seeing her for follow-up. Since last office visit she has had kidney biopsy which showed chronic changes including secondary FSGS, arterionephrosclerosis, severe interstitial fibrosis with 80% tubular dropout. Patient is here for follow-up. She reports she met with the renal modality educator. She is interested in in center hemo but at this time patient is overwhelmed with her home situation. Apparently her apartment building needs to be remodeled and she has to be temporarily relocated to another building. Patient denies any shortness of breath. No nausea, vomiting. June 20, 2023: Here for follow-up. No new complaints. No shortness of breath or chest pain. Still having some appt and living situation issues to due to apartment needing remodeled.       Past History:  Past Medical History:   Diagnosis Date    Anemia 2008    IRON DEFIENCY    Asthma     2011    Cellulitis 2005    KNEES    Chronic kidney disease 2023    COPD (chronic obstructive pulmonary disease)

## 2023-08-07 RX ORDER — BUMETANIDE 1 MG/1
TABLET ORAL
Qty: 60 TABLET | Refills: 5 | Status: SHIPPED | OUTPATIENT
Start: 2023-08-07

## 2023-08-07 RX ORDER — DOXAZOSIN MESYLATE 4 MG/1
TABLET ORAL
Qty: 60 TABLET | Refills: 5 | Status: SHIPPED | OUTPATIENT
Start: 2023-08-07

## 2023-08-28 RX ORDER — CARVEDILOL 25 MG/1
25 TABLET ORAL 2 TIMES DAILY WITH MEALS
Qty: 180 TABLET | Refills: 3 | Status: SHIPPED | OUTPATIENT
Start: 2023-08-28

## 2023-08-28 RX ORDER — HYDRALAZINE HYDROCHLORIDE 100 MG/1
100 TABLET, FILM COATED ORAL EVERY 8 HOURS SCHEDULED
Qty: 270 TABLET | Refills: 3 | Status: SHIPPED | OUTPATIENT
Start: 2023-08-28

## 2023-09-13 ENCOUNTER — TELEPHONE (OUTPATIENT)
Dept: NEPHROLOGY | Age: 53
End: 2023-09-13

## 2023-09-13 RX ORDER — HYDRALAZINE HYDROCHLORIDE 100 MG/1
100 TABLET, FILM COATED ORAL EVERY 8 HOURS SCHEDULED
Qty: 270 TABLET | Refills: 3 | Status: SHIPPED | OUTPATIENT
Start: 2023-09-13

## 2023-09-13 NOTE — TELEPHONE ENCOUNTER
Patient called stating we refilled her Hydralazine 100 mg 1 tablet every 8 hrs on 8.28.23 and the pharmacy still does not have it available. They are expecting it soon but they keep telling her it is expected \"on the next truck\" but they end up not receiving it. She just wanted you to know she hasn't taken any for about 3 weeks.

## 2023-09-18 ENCOUNTER — HOSPITAL ENCOUNTER (OUTPATIENT)
Age: 53
Discharge: HOME | End: 2023-09-18
Payer: MEDICAID

## 2023-09-18 DIAGNOSIS — N18.5 ANEMIA IN STAGE 5 CHRONIC KIDNEY DISEASE, NOT ON CHRONIC DIALYSIS (HCC): ICD-10-CM

## 2023-09-18 DIAGNOSIS — I12.9 HYPERTENSIVE RENAL DISEASE, STAGE 1 THROUGH STAGE 4 OR UNSPECIFIED CHRONIC KIDNEY DISEASE: ICD-10-CM

## 2023-09-18 DIAGNOSIS — D63.1 ANEMIA IN STAGE 5 CHRONIC KIDNEY DISEASE, NOT ON CHRONIC DIALYSIS (HCC): ICD-10-CM

## 2023-09-18 DIAGNOSIS — N18.5 CKD (CHRONIC KIDNEY DISEASE) STAGE 5, GFR LESS THAN 15 ML/MIN (HCC): ICD-10-CM

## 2023-09-18 DIAGNOSIS — D50.9 IRON DEFICIENCY ANEMIA, UNSPECIFIED IRON DEFICIENCY ANEMIA TYPE: ICD-10-CM

## 2023-09-18 LAB
ANION GAP SERPL CALCULATED.3IONS-SCNC: 11 MMOL/L (ref 9–17)
BASOPHILS # BLD: 0.04 K/UL (ref 0–0.2)
BASOPHILS NFR BLD: 1 % (ref 0–2)
BUN SERPL-MCNC: 59 MG/DL (ref 6–20)
BUN/CREAT SERPL: 17 (ref 9–20)
CALCIUM SERPL-MCNC: 9.8 MG/DL (ref 8.6–10.4)
CHLORIDE SERPL-SCNC: 104 MMOL/L (ref 98–107)
CO2 SERPL-SCNC: 22 MMOL/L (ref 20–31)
CREAT SERPL-MCNC: 3.4 MG/DL (ref 0.5–0.9)
EOSINOPHIL # BLD: <0.03 K/UL (ref 0–0.44)
EOSINOPHILS RELATIVE PERCENT: 0 % (ref 1–4)
ERYTHROCYTE [DISTWIDTH] IN BLOOD BY AUTOMATED COUNT: 14.1 % (ref 11.8–14.4)
GFR SERPL CREATININE-BSD FRML MDRD: 16 ML/MIN/1.73M2
GLUCOSE SERPL-MCNC: 94 MG/DL (ref 70–99)
HCT VFR BLD AUTO: 36 % (ref 36.3–47.1)
HGB BLD-MCNC: 10.9 G/DL (ref 11.9–15.1)
IMM GRANULOCYTES # BLD AUTO: 0.07 K/UL (ref 0–0.3)
IMM GRANULOCYTES NFR BLD: 1 %
LYMPHOCYTES NFR BLD: 2.13 K/UL (ref 1.1–3.7)
LYMPHOCYTES RELATIVE PERCENT: 29 % (ref 24–43)
MCH RBC QN AUTO: 28.4 PG (ref 25.2–33.5)
MCHC RBC AUTO-ENTMCNC: 30.3 G/DL (ref 28.4–34.8)
MCV RBC AUTO: 93.8 FL (ref 82.6–102.9)
MONOCYTES NFR BLD: 0.59 K/UL (ref 0.1–1.2)
MONOCYTES NFR BLD: 8 % (ref 3–12)
NEUTROPHILS NFR BLD: 61 % (ref 36–65)
NEUTS SEG NFR BLD: 4.4 K/UL (ref 1.5–8.1)
NRBC BLD-RTO: 0 PER 100 WBC
PLATELET # BLD AUTO: 239 K/UL (ref 138–453)
PMV BLD AUTO: 11.5 FL (ref 8.1–13.5)
POTASSIUM SERPL-SCNC: 4.5 MMOL/L (ref 3.7–5.3)
RBC # BLD AUTO: 3.84 M/UL (ref 3.95–5.11)
SODIUM SERPL-SCNC: 137 MMOL/L (ref 135–144)
WBC OTHER # BLD: 7.2 K/UL (ref 3.5–11.3)

## 2023-09-18 PROCEDURE — 80048 BASIC METABOLIC PNL TOTAL CA: CPT

## 2023-09-18 PROCEDURE — 85025 COMPLETE CBC W/AUTO DIFF WBC: CPT

## 2023-09-18 PROCEDURE — 36415 COLL VENOUS BLD VENIPUNCTURE: CPT

## 2023-09-19 ENCOUNTER — OFFICE VISIT (OUTPATIENT)
Dept: NEPHROLOGY | Age: 53
End: 2023-09-19
Payer: MEDICAID

## 2023-09-19 VITALS
WEIGHT: 293 LBS | SYSTOLIC BLOOD PRESSURE: 149 MMHG | HEIGHT: 66 IN | HEART RATE: 92 BPM | DIASTOLIC BLOOD PRESSURE: 83 MMHG | RESPIRATION RATE: 18 BRPM | BODY MASS INDEX: 47.09 KG/M2 | TEMPERATURE: 96.1 F

## 2023-09-19 DIAGNOSIS — I12.9 HYPERTENSIVE RENAL DISEASE, STAGE 1 THROUGH STAGE 4 OR UNSPECIFIED CHRONIC KIDNEY DISEASE: ICD-10-CM

## 2023-09-19 DIAGNOSIS — N18.5 ANEMIA IN STAGE 5 CHRONIC KIDNEY DISEASE, NOT ON CHRONIC DIALYSIS (HCC): ICD-10-CM

## 2023-09-19 DIAGNOSIS — D63.1 ANEMIA IN STAGE 5 CHRONIC KIDNEY DISEASE, NOT ON CHRONIC DIALYSIS (HCC): ICD-10-CM

## 2023-09-19 DIAGNOSIS — E87.20 METABOLIC ACIDOSIS: ICD-10-CM

## 2023-09-19 DIAGNOSIS — N18.5 CKD (CHRONIC KIDNEY DISEASE) STAGE 5, GFR LESS THAN 15 ML/MIN (HCC): Primary | ICD-10-CM

## 2023-09-19 PROCEDURE — 3079F DIAST BP 80-89 MM HG: CPT | Performed by: INTERNAL MEDICINE

## 2023-09-19 PROCEDURE — 1036F TOBACCO NON-USER: CPT | Performed by: INTERNAL MEDICINE

## 2023-09-19 PROCEDURE — 3077F SYST BP >= 140 MM HG: CPT | Performed by: INTERNAL MEDICINE

## 2023-09-19 PROCEDURE — 3017F COLORECTAL CA SCREEN DOC REV: CPT | Performed by: INTERNAL MEDICINE

## 2023-09-19 PROCEDURE — G8417 CALC BMI ABV UP PARAM F/U: HCPCS | Performed by: INTERNAL MEDICINE

## 2023-09-19 PROCEDURE — G8427 DOCREV CUR MEDS BY ELIG CLIN: HCPCS | Performed by: INTERNAL MEDICINE

## 2023-09-19 PROCEDURE — 99213 OFFICE O/P EST LOW 20 MIN: CPT | Performed by: INTERNAL MEDICINE

## 2023-09-19 RX ORDER — OLOPATADINE HYDROCHLORIDE 1 MG/ML
SOLUTION/ DROPS OPHTHALMIC
COMMUNITY
Start: 2023-07-11

## 2023-09-19 RX ORDER — SODIUM BICARBONATE 650 MG/1
1300 TABLET ORAL 3 TIMES DAILY
Qty: 180 TABLET | Refills: 3 | Status: SHIPPED | OUTPATIENT
Start: 2023-09-19

## 2023-09-19 RX ORDER — HYDROCODONE BITARTRATE AND ACETAMINOPHEN 5; 325 MG/1; MG/1
TABLET ORAL
COMMUNITY
Start: 2023-08-30

## 2023-09-19 RX ORDER — BENZONATATE 100 MG/1
CAPSULE ORAL
COMMUNITY
Start: 2023-09-08

## 2023-09-19 NOTE — PATIENT INSTRUCTIONS
SURVEY:    You may be receiving a survey from [employer] regarding your visit today. Please complete the survey to enable us to provide the highest quality of care to you and your family. If you cannot score us a very good on any question, please call the office to discuss how we could have made your experience a very good one. Thank you.

## 2023-09-19 NOTE — PROGRESS NOTES
[facility] [address] [facility] KIDNEY AND HYPERTENSION  [address]  [facility] [address]  Dept: [phone]  Office Progress Note  9/19/2023 12:39 PM      Pt Name:    Cat Grier:    1970  Primary Care Physician:  Malcolm Smith, APRN - CNP     Chief Complaint:   Chief Complaint   Patient presents with    Chronic Kidney Disease     Pt here for follow up of CKD 5. Pt denies kidney pain or any swelling. Background Information/Interval History:   Patient is 48 yo pleasant AAF whom I am seeing for first time in office. I saw her last week at [facility] where she was admitted with elevated creatinine. Her creat used to run in 1.1 range until 2021. But in Nov 2022 her creat was over 4. She has chronic lymphedema. [address] [facility] morbid obesity weight loss surgery in 2016, HTN. She has taken alevel, advil in the past. She has also been taking relafen 500 mg BID x 5 years. May 8, 2023: Seeing her for follow-up. Since last office visit she has had kidney biopsy which showed chronic changes including secondary FSGS, arterionephrosclerosis, severe interstitial fibrosis with 80% tubular dropout. Patient is here for follow-up. She reports she met with the renal modality educator. She is interested in in center hemo but at this time patient is overwhelmed with her home situation. Apparently her apartment building needs to be remodeled and she has to be temporarily relocated to another building. Patient denies any shortness of breath. No nausea, vomiting. June 20, 2023: Here for follow-up. No new complaints. No shortness of breath or chest pain. Still having some appt and living situation issues to due to apartment needing remodeled. Sept 2023: Pt is here for follow-up. Reports she just started taking her hydralazine last week. Denies any shortness of breath. No urinary complaints.  She is requesting refill for sodium

## 2023-09-20 ENCOUNTER — OFFICE VISIT (OUTPATIENT)
Dept: ONCOLOGY | Age: 53
End: 2023-09-20
Payer: MEDICAID

## 2023-09-20 VITALS
RESPIRATION RATE: 20 BRPM | WEIGHT: 293 LBS | SYSTOLIC BLOOD PRESSURE: 161 MMHG | BODY MASS INDEX: 59.56 KG/M2 | HEART RATE: 69 BPM | TEMPERATURE: 96.9 F | DIASTOLIC BLOOD PRESSURE: 88 MMHG

## 2023-09-20 DIAGNOSIS — D50.9 IRON DEFICIENCY ANEMIA, UNSPECIFIED IRON DEFICIENCY ANEMIA TYPE: Primary | ICD-10-CM

## 2023-09-20 DIAGNOSIS — D63.1 ANEMIA IN STAGE 5 CHRONIC KIDNEY DISEASE, NOT ON CHRONIC DIALYSIS (HCC): ICD-10-CM

## 2023-09-20 DIAGNOSIS — N18.5 ANEMIA IN STAGE 5 CHRONIC KIDNEY DISEASE, NOT ON CHRONIC DIALYSIS (HCC): ICD-10-CM

## 2023-09-20 PROCEDURE — G8427 DOCREV CUR MEDS BY ELIG CLIN: HCPCS | Performed by: INTERNAL MEDICINE

## 2023-09-20 PROCEDURE — 3017F COLORECTAL CA SCREEN DOC REV: CPT | Performed by: INTERNAL MEDICINE

## 2023-09-20 PROCEDURE — 3079F DIAST BP 80-89 MM HG: CPT | Performed by: INTERNAL MEDICINE

## 2023-09-20 PROCEDURE — 1036F TOBACCO NON-USER: CPT | Performed by: INTERNAL MEDICINE

## 2023-09-20 PROCEDURE — 3077F SYST BP >= 140 MM HG: CPT | Performed by: INTERNAL MEDICINE

## 2023-09-20 PROCEDURE — 99213 OFFICE O/P EST LOW 20 MIN: CPT | Performed by: INTERNAL MEDICINE

## 2023-09-20 PROCEDURE — G8417 CALC BMI ABV UP PARAM F/U: HCPCS | Performed by: INTERNAL MEDICINE
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
## 2023-11-25 DIAGNOSIS — J45.50 SEVERE PERSISTENT ASTHMA WITHOUT COMPLICATION: ICD-10-CM

## 2023-11-27 RX ORDER — ALBUTEROL SULFATE 90 UG/1
2 AEROSOL, METERED RESPIRATORY (INHALATION) EVERY 6 HOURS PRN
Qty: 1 EACH | Refills: 11 | Status: SHIPPED | OUTPATIENT
Start: 2023-11-27

## 2023-11-30 DIAGNOSIS — J45.50 SEVERE PERSISTENT ASTHMA WITHOUT COMPLICATION: ICD-10-CM

## 2023-12-01 RX ORDER — MONTELUKAST SODIUM 10 MG/1
10 TABLET ORAL NIGHTLY
Qty: 30 TABLET | Refills: 0 | Status: SHIPPED | OUTPATIENT
Start: 2023-12-01

## 2024-01-06 DIAGNOSIS — J45.50 SEVERE PERSISTENT ASTHMA WITHOUT COMPLICATION: ICD-10-CM

## 2024-01-08 RX ORDER — MONTELUKAST SODIUM 10 MG/1
10 TABLET ORAL NIGHTLY
Qty: 30 TABLET | Refills: 0 | Status: SHIPPED | OUTPATIENT
Start: 2024-01-08

## 2024-01-22 RX ORDER — BUMETANIDE 1 MG/1
TABLET ORAL
Qty: 60 TABLET | Refills: 1 | Status: SHIPPED | OUTPATIENT
Start: 2024-01-22

## 2024-01-22 RX ORDER — DOXAZOSIN MESYLATE 4 MG/1
TABLET ORAL
Qty: 60 TABLET | Refills: 1 | Status: SHIPPED | OUTPATIENT
Start: 2024-01-22

## 2024-02-05 DIAGNOSIS — J45.50 SEVERE PERSISTENT ASTHMA WITHOUT COMPLICATION: ICD-10-CM

## 2024-02-05 RX ORDER — MONTELUKAST SODIUM 10 MG/1
10 TABLET ORAL NIGHTLY
Qty: 30 TABLET | Refills: 0 | Status: SHIPPED | OUTPATIENT
Start: 2024-02-05

## 2024-02-19 ENCOUNTER — HOSPITAL ENCOUNTER (OUTPATIENT)
Age: 54
Discharge: HOME | End: 2024-02-19
Payer: MEDICAID

## 2024-02-19 DIAGNOSIS — N18.5 CKD (CHRONIC KIDNEY DISEASE) STAGE 5, GFR LESS THAN 15 ML/MIN (HCC): ICD-10-CM

## 2024-02-19 DIAGNOSIS — N18.5 ANEMIA IN STAGE 5 CHRONIC KIDNEY DISEASE, NOT ON CHRONIC DIALYSIS (HCC): ICD-10-CM

## 2024-02-19 DIAGNOSIS — E87.20 METABOLIC ACIDOSIS: ICD-10-CM

## 2024-02-19 DIAGNOSIS — D50.9 IRON DEFICIENCY ANEMIA, UNSPECIFIED IRON DEFICIENCY ANEMIA TYPE: ICD-10-CM

## 2024-02-19 DIAGNOSIS — I12.9 HYPERTENSIVE RENAL DISEASE, STAGE 1 THROUGH STAGE 4 OR UNSPECIFIED CHRONIC KIDNEY DISEASE: ICD-10-CM

## 2024-02-19 DIAGNOSIS — D63.1 ANEMIA IN STAGE 5 CHRONIC KIDNEY DISEASE, NOT ON CHRONIC DIALYSIS (HCC): ICD-10-CM

## 2024-02-19 LAB
ALBUMIN SERPL-MCNC: 4 G/DL (ref 3.5–5.2)
ALBUMIN/GLOB SERPL: 1.1 {RATIO} (ref 1–2.5)
ALP SERPL-CCNC: 79 U/L (ref 35–104)
ALT SERPL-CCNC: <5 U/L (ref 5–33)
ANION GAP SERPL CALCULATED.3IONS-SCNC: 14 MMOL/L (ref 9–17)
AST SERPL-CCNC: 7 U/L
BASOPHILS # BLD: 0.06 K/UL (ref 0–0.2)
BASOPHILS NFR BLD: 1 % (ref 0–2)
BILIRUB SERPL-MCNC: 0.5 MG/DL (ref 0.3–1.2)
BUN SERPL-MCNC: 28 MG/DL (ref 6–20)
BUN/CREAT SERPL: 8 (ref 9–20)
CALCIUM SERPL-MCNC: 9.5 MG/DL (ref 8.6–10.4)
CHLORIDE SERPL-SCNC: 104 MMOL/L (ref 98–107)
CHOLEST SERPL-MCNC: 202 MG/DL
CHOLESTEROL/HDL RATIO: 3.4
CO2 SERPL-SCNC: 22 MMOL/L (ref 20–31)
CREAT SERPL-MCNC: 3.7 MG/DL (ref 0.5–0.9)
EOSINOPHIL # BLD: 0.15 K/UL (ref 0–0.44)
EOSINOPHILS RELATIVE PERCENT: 2 % (ref 1–4)
ERYTHROCYTE [DISTWIDTH] IN BLOOD BY AUTOMATED COUNT: 13.5 % (ref 11.8–14.4)
GFR SERPL CREATININE-BSD FRML MDRD: 14 ML/MIN/1.73M2
GLUCOSE SERPL-MCNC: 88 MG/DL (ref 70–99)
HCT VFR BLD AUTO: 33.1 % (ref 36.3–47.1)
HDLC SERPL-MCNC: 60 MG/DL
HGB BLD-MCNC: 10.3 G/DL (ref 11.9–15.1)
IMM GRANULOCYTES # BLD AUTO: 0.03 K/UL (ref 0–0.3)
IMM GRANULOCYTES NFR BLD: 1 %
IRON SATN MFR SERPL: 19 % (ref 20–55)
IRON SERPL-MCNC: 39 UG/DL (ref 37–145)
LDLC SERPL CALC-MCNC: 125 MG/DL (ref 0–130)
LDLC SERPL DIRECT ASSAY-MCNC: 126 MG/DL
LYMPHOCYTES NFR BLD: 1.68 K/UL (ref 1.1–3.7)
LYMPHOCYTES RELATIVE PERCENT: 27 % (ref 24–43)
MCH RBC QN AUTO: 29.9 PG (ref 25.2–33.5)
MCHC RBC AUTO-ENTMCNC: 31.1 G/DL (ref 28.4–34.8)
MCV RBC AUTO: 96.2 FL (ref 82.6–102.9)
MONOCYTES NFR BLD: 0.56 K/UL (ref 0.1–1.2)
MONOCYTES NFR BLD: 9 % (ref 3–12)
NEUTROPHILS NFR BLD: 60 % (ref 36–65)
NEUTS SEG NFR BLD: 3.85 K/UL (ref 1.5–8.1)
NRBC BLD-RTO: 0 PER 100 WBC
PLATELET # BLD AUTO: 245 K/UL (ref 138–453)
PMV BLD AUTO: 10.5 FL (ref 8.1–13.5)
POTASSIUM SERPL-SCNC: 5 MMOL/L (ref 3.7–5.3)
PROT SERPL-MCNC: 7.5 G/DL (ref 6.4–8.3)
RBC # BLD AUTO: 3.44 M/UL (ref 3.95–5.11)
SODIUM SERPL-SCNC: 140 MMOL/L (ref 135–144)
T3 SERPL-MCNC: 80 NG/DL (ref 80–200)
T4 FREE SERPL-MCNC: 1.2 NG/DL (ref 0.9–1.7)
TIBC SERPL-MCNC: 202 UG/DL (ref 250–450)
TRIGL SERPL-MCNC: 85 MG/DL
TSH SERPL DL<=0.05 MIU/L-ACNC: 2.1 UIU/ML (ref 0.3–5)
UNSATURATED IRON BINDING CAPACITY: 163 UG/DL (ref 112–347)
WBC OTHER # BLD: 6.3 K/UL (ref 3.5–11.3)

## 2024-02-19 PROCEDURE — 84480 ASSAY TRIIODOTHYRONINE (T3): CPT

## 2024-02-19 PROCEDURE — 80053 COMPREHEN METABOLIC PANEL: CPT

## 2024-02-19 PROCEDURE — 84439 ASSAY OF FREE THYROXINE: CPT

## 2024-02-19 PROCEDURE — 36415 COLL VENOUS BLD VENIPUNCTURE: CPT

## 2024-02-19 PROCEDURE — 83540 ASSAY OF IRON: CPT

## 2024-02-19 PROCEDURE — 83721 ASSAY OF BLOOD LIPOPROTEIN: CPT

## 2024-02-19 PROCEDURE — 85025 COMPLETE CBC W/AUTO DIFF WBC: CPT

## 2024-02-19 PROCEDURE — 84443 ASSAY THYROID STIM HORMONE: CPT

## 2024-02-19 PROCEDURE — 80061 LIPID PANEL: CPT

## 2024-02-19 PROCEDURE — 83550 IRON BINDING TEST: CPT

## 2024-02-20 ENCOUNTER — OFFICE VISIT (OUTPATIENT)
Dept: NEPHROLOGY | Age: 54
End: 2024-02-20
Payer: MEDICAID

## 2024-02-20 VITALS
DIASTOLIC BLOOD PRESSURE: 73 MMHG | RESPIRATION RATE: 22 BRPM | HEART RATE: 94 BPM | BODY MASS INDEX: 47.09 KG/M2 | WEIGHT: 293 LBS | SYSTOLIC BLOOD PRESSURE: 119 MMHG | TEMPERATURE: 97.2 F | HEIGHT: 66 IN

## 2024-02-20 DIAGNOSIS — D63.1 ANEMIA IN STAGE 5 CHRONIC KIDNEY DISEASE, NOT ON CHRONIC DIALYSIS (HCC): ICD-10-CM

## 2024-02-20 DIAGNOSIS — I12.9 HYPERTENSIVE RENAL DISEASE, STAGE 1 THROUGH STAGE 4 OR UNSPECIFIED CHRONIC KIDNEY DISEASE: ICD-10-CM

## 2024-02-20 DIAGNOSIS — N18.5 CKD (CHRONIC KIDNEY DISEASE) STAGE 5, GFR LESS THAN 15 ML/MIN (HCC): Primary | ICD-10-CM

## 2024-02-20 DIAGNOSIS — N18.5 ANEMIA IN STAGE 5 CHRONIC KIDNEY DISEASE, NOT ON CHRONIC DIALYSIS (HCC): ICD-10-CM

## 2024-02-20 PROCEDURE — G8427 DOCREV CUR MEDS BY ELIG CLIN: HCPCS | Performed by: INTERNAL MEDICINE

## 2024-02-20 PROCEDURE — G8484 FLU IMMUNIZE NO ADMIN: HCPCS | Performed by: INTERNAL MEDICINE

## 2024-02-20 PROCEDURE — 1036F TOBACCO NON-USER: CPT | Performed by: INTERNAL MEDICINE

## 2024-02-20 PROCEDURE — 99213 OFFICE O/P EST LOW 20 MIN: CPT | Performed by: INTERNAL MEDICINE

## 2024-02-20 PROCEDURE — G8417 CALC BMI ABV UP PARAM F/U: HCPCS | Performed by: INTERNAL MEDICINE

## 2024-02-20 PROCEDURE — 3017F COLORECTAL CA SCREEN DOC REV: CPT | Performed by: INTERNAL MEDICINE

## 2024-02-20 PROCEDURE — 3074F SYST BP LT 130 MM HG: CPT | Performed by: INTERNAL MEDICINE

## 2024-02-20 PROCEDURE — 3078F DIAST BP <80 MM HG: CPT | Performed by: INTERNAL MEDICINE

## 2024-02-20 NOTE — PATIENT INSTRUCTIONS
SURVEY:    Thank you for allowing us to care for you today.    You may be receiving a survey from [facility] regarding your visit today- electronically or via mail.      Please help us by completing the survey as this will provide the needed feedback to ensure we are providing the very best care for you and your family.    If you cannot score us a very good on any question, please call the office to discuss how we could have made your experience a very good one.    Thank you.       STAFF:    Kia Dolan, Lucia Hunt, Lisa Comer      CLINICAL STAFF:    Aracely Garvin LPN, Diana Frye LPN, Karlie Vega LPN, Teri Cruz CMA

## 2024-02-20 NOTE — PROGRESS NOTES
BMI 58.57 kg/m²   Wt Readings from Last 3 Encounters:   02/20/24 (!) 164.6 kg (362 lb 14.4 oz)   09/20/23 (!) 167.4 kg (369 lb)   09/19/23 (!) 168.8 kg (372 lb 1.6 oz)     Body mass index is 58.57 kg/m².     General Appearance: alert and cooperative with exam, appears comfortable, no distress  Oral: moist oral mucus membranes  Neck: No jugular venous distention  Lungs: Air entry B/L, no crackles or rales, no use of accessory muscles  Heart: S1, S2 heard  GI: soft, non-tender, no guarding, ++obese  Extremities: + edema     Medications:  Current Outpatient Medications   Medication Sig Dispense Refill    montelukast (SINGULAIR) 10 MG tablet take 1 tablet by mouth at bedtime 30 tablet 0    bumetanide (BUMEX) 1 MG tablet take 1 tablet by mouth twice a day 60 tablet 1    doxazosin (CARDURA) 4 MG tablet take 1 tablet by mouth twice a day 60 tablet 1    albuterol sulfate HFA (PROVENTIL;VENTOLIN;PROAIR) 108 (90 Base) MCG/ACT inhaler Inhale 2 puffs into the lungs every 6 hours as needed for Wheezing or Shortness of Breath 1 each 11    HYDROcodone-acetaminophen (NORCO) 5-325 MG per tablet take 1/2 to 1 tablet by mouth twice a day if needed for pain for 30 DAYS      benzonatate (TESSALON) 100 MG capsule take 1-2 capsules by mouth every 6 hours if needed for cough and congestion      sodium bicarbonate 650 MG tablet Take 2 tablets by mouth 3 times daily 180 tablet 3    hydrALAZINE (APRESOLINE) 100 MG tablet Take 1 tablet by mouth every 8 hours 270 tablet 3    carvedilol (COREG) 25 MG tablet Take 1 tablet by mouth 2 times daily (with meals) 180 tablet 3    atorvastatin (LIPITOR) 80 MG tablet Take 1 tablet by mouth nightly at bedtime.      ondansetron (ZOFRAN-ODT) 4 MG disintegrating tablet       tiZANidine (ZANAFLEX) 4 MG tablet Take 1 tablet by mouth every 8 hours as needed (spasms) 30 tablet 0    mometasone-formoterol (DULERA) 200-5 MCG/ACT inhaler Inhale 2 puffs into the lungs in the morning and 2 puffs in the evening. 1 each

## 2024-03-09 DIAGNOSIS — J45.50 SEVERE PERSISTENT ASTHMA WITHOUT COMPLICATION: ICD-10-CM

## 2024-03-11 RX ORDER — MONTELUKAST SODIUM 10 MG/1
10 TABLET ORAL NIGHTLY
Qty: 30 TABLET | Refills: 0 | Status: SHIPPED | OUTPATIENT
Start: 2024-03-11

## 2024-04-01 RX ORDER — BUMETANIDE 1 MG/1
TABLET ORAL
Qty: 180 TABLET | Refills: 3 | Status: SHIPPED | OUTPATIENT
Start: 2024-04-01

## 2024-04-01 RX ORDER — DOXAZOSIN MESYLATE 4 MG/1
TABLET ORAL
Qty: 180 TABLET | Refills: 3 | Status: SHIPPED | OUTPATIENT
Start: 2024-04-01

## 2024-04-13 DIAGNOSIS — J45.50 SEVERE PERSISTENT ASTHMA WITHOUT COMPLICATION: ICD-10-CM

## 2024-04-15 RX ORDER — MONTELUKAST SODIUM 10 MG/1
10 TABLET ORAL NIGHTLY
Qty: 30 TABLET | Refills: 0 | Status: SHIPPED | OUTPATIENT
Start: 2024-04-15

## 2024-05-02 ENCOUNTER — HOSPITAL ENCOUNTER (OUTPATIENT)
Dept: PHYSICAL THERAPY | Age: 54
Setting detail: THERAPIES SERIES
Discharge: HOME | End: 2024-05-02
Payer: MEDICAID

## 2024-05-02 PROCEDURE — 97162 PT EVAL MOD COMPLEX 30 MIN: CPT

## 2024-05-06 ENCOUNTER — HOSPITAL ENCOUNTER (OUTPATIENT)
Dept: PHYSICAL THERAPY | Age: 54
Setting detail: THERAPIES SERIES
Discharge: HOME | End: 2024-05-06
Payer: MEDICAID

## 2024-05-06 PROCEDURE — 97110 THERAPEUTIC EXERCISES: CPT

## 2024-05-06 NOTE — PROGRESS NOTES
Phone: [phone]                 [facility]           Fax: [phone]                           Outpatient Physical Therapy                                                                            Daily Note    Patient: Nirav Elmore : 1970  [facility] #: [id number]   Referring Physician: Joseline Fish APRN -*  Date: 2024       Treatment Diagnosis: Generalized weakness, decreased activity endurance    PT Insurance Information: [facility]  Total # of Visits Approved: 10 Per Physician Order  Total # of Visits to Date: 2  No Show: 0  Canceled Appointment: 0    24 Plan of Care/Recert Due    Pre-Treatment Pain:  5/10  Subjective: Pt. reports pain upon arrival and stated she was sore after eval. Reports needing rest breaks with ambulation d/t fatigue.    Exercises:  Exercise 1: HEP: seated exercises: LAQ, hip flexion, heel/toe raises (performed x5 ea today)  Exercise 2: Sci-fit x7' with RB d/t fatigue.  Exercise 3: Seated exercises B LE x10  Exercise 4: STS x5  Exercise 5: Toe taps seated B LE x10 on 1\" step.    Assessment  Body Structures, Functions, Activity Limitations Requiring Skilled Therapeutic Intervention: Decreased functional mobility , Decreased ADL status, Decreased strength, Decreased endurance, Decreased balance, Decreased high-level IADLs, Decreased posture, Increased pain  Assessment: PT. tolerated treatment fair, requried frequent RB d/t fatigue and decreased endurance. Progressed in strengthening exercises for B LE with fair tolerance. Will progress per pt. tolerance/    Activity Tolerance  Activity Tolerance: Patient tolerated treatment well    Patient Education  Patient Education: HEP  Pt verbalized/demonstrated good understanding:     [x] Yes         [] No, pt required further clarification.       Post Treatment Pain:  4/10      Plan  Plan Frequency: 2  Plan weeks: 5       Goals  (Total # of Visits to Date: 2)      Short Term Goals  Time Frame for Short Term Goals: 3

## 2024-05-09 ENCOUNTER — HOSPITAL ENCOUNTER (OUTPATIENT)
Dept: PHYSICAL THERAPY | Age: 54
Setting detail: THERAPIES SERIES
Discharge: HOME | End: 2024-05-09
Payer: MEDICAID

## 2024-05-09 PROCEDURE — 97110 THERAPEUTIC EXERCISES: CPT

## 2024-05-09 NOTE — PROGRESS NOTES
Phone: [phone]                 [facility]           Fax: [phone]                           Outpatient Physical Therapy                                                                            Daily Note    Patient: Nirav Elmore : 1970  [facility] #: [id number]   Referring Physician: Joseline Fish APRN -*  Date: 2024       Treatment Diagnosis: Generalized weakness, decreased activity endurance    PT Insurance Information: [facility]  Total # of Visits Approved: 10 Per Physician Order  Total # of Visits to Date: 3  No Show: 0  Canceled Appointment: 0    24 Plan of Care/Recert Due    Pre-Treatment Pain:  8/10  Subjective: Pt. reports increased pain upon arrival 8/10 and stated she feels it is worse because of the cold weather.    Exercises:  Exercise 1: HEP: seated exercises: LAQ, hip flexion, heel/toe raises (performed x5 ea today)  Exercise 2: Sci-fit x8' with RB d/t fatigue.  Exercise 3: Seated exercises B LE x10  Exercise 4: STS x6  Exercise 5: Toe taps seated B LE x10 on 1\" step.      Assessment  Body Structures, Functions, Activity Limitations Requiring Skilled Therapeutic Intervention: Decreased functional mobility , Decreased ADL status, Decreased strength, Decreased endurance, Decreased balance, Decreased high-level IADLs, Decreased posture, Increased pain  Assessment: Pt. tolerated treamtnet well. Pt. continues to require RB with sci-fit but able to complete 8'. Able to copmplete 6 STS from cahir with B UE use. Adjusted pts SC to proper heigth. Will progress per pt. tolerance.    Activity Tolerance  Activity Tolerance: Patient tolerated treatment well    Patient Education  Patient Education: HEP  Pt verbalized/demonstrated good understanding:     [x] Yes         [] No, pt required further clarification.       Post Treatment Pain:  7-8/10      Plan  Plan Frequency: 2  Plan weeks: 5       Goals  (Total # of Visits to Date: 3)      Short Term Goals  Time Frame for Short Term Goals:

## 2024-05-12 DIAGNOSIS — J45.50 SEVERE PERSISTENT ASTHMA WITHOUT COMPLICATION: ICD-10-CM

## 2024-05-13 ENCOUNTER — HOSPITAL ENCOUNTER (OUTPATIENT)
Dept: PHYSICAL THERAPY | Age: 54
Setting detail: THERAPIES SERIES
Discharge: HOME | End: 2024-05-13
Payer: MEDICAID

## 2024-05-13 PROCEDURE — 97110 THERAPEUTIC EXERCISES: CPT

## 2024-05-13 RX ORDER — MONTELUKAST SODIUM 10 MG/1
10 TABLET ORAL NIGHTLY
Qty: 30 TABLET | Refills: 1 | Status: SHIPPED | OUTPATIENT
Start: 2024-05-13

## 2024-05-13 NOTE — TELEPHONE ENCOUNTER
No further refills unless seen- If not seen in pulm office, can be seen by PCP with PCP to order refills.

## 2024-05-13 NOTE — TELEPHONE ENCOUNTER
We received a refill request from Nirav for Garethir. No future appointment scheduled. No show or cancel for last several appointments.  Last office visit 11/16/2022. I called Nirav and explained that we received the refill request and that she has not seen a provider since 11/2022. I explained that if we are prescribing medication to her she needs to be seen at least on a yearly basis. Voices understanding.  Appointment given for 6/19/2024. She assured me she will come to her next appointment.  Please advise.

## 2024-05-13 NOTE — PROGRESS NOTES
4/5 in all major joints and planes.  Long Term Goal 3: Patient will improve TUG test time to <20 seconds.  Long Term Goal 4: Patient will be able to perform 5 sit to stands without UE assist.  Long Term Goal 5: Patient will be able to ambulate 200' before requiring a seated rest break.    Minutes Tracking:  Time In: 1146  Time Out: 1230  Minutes: 44  Timed Code Treatment Minutes: 41 Minutes    Agus Chua, MJ     Date: 5/13/2024

## 2024-05-15 ENCOUNTER — HOSPITAL ENCOUNTER (OUTPATIENT)
Dept: PHYSICAL THERAPY | Age: 54
Setting detail: THERAPIES SERIES
Discharge: HOME | End: 2024-05-15
Payer: MEDICAID

## 2024-05-15 PROCEDURE — 97110 THERAPEUTIC EXERCISES: CPT

## 2024-05-15 NOTE — PROGRESS NOTES
Phone: [phone]                 [facility]           Fax: [phone]                           Outpatient Physical Therapy                                                                            Daily Note    Patient: Nirav Elmore : 1970  [facility] #: [id number]   Referring Physician: Joseline Fish APRN -*  Date: 5/15/2024    Diagnosis: Reduced mobility, Z74.09  Treatment Diagnosis: Generalized weakness, decreased activity endurance    PT Insurance Information: [facility]  Total # of Visits Approved: 10 Per Physician Order  Total # of Visits to Date: 5  No Show: 0  Canceled Appointment: 0    24 Plan of Care/Recert Due    Pre-Treatment Pain:  8/10  Subjective: Pt states her body aches from the weather change. Pt rates pain 8/10 in her legs    Exercises:  Exercise 1: HEP: seated exercises: LAQ, hip flexion, heel/toe raises (performed x5 ea today)  Exercise 2: Sci-fit x10 min  Exercise 3: Seated exercises B LE x10  Exercise 4: STS 8x2  Exercise 5: Toe taps seated B LE x10 on 1\" step.  Exercise 6: 1 big loop SC  Exercise 7: seated ball rollout (gray ball in lap) fwd and sides 10x 10 sec       Assessment  Assessment: Pt states her legs hurt and her whole body aches. Pt performed exercise to tolerance.Pt needed several rest  during the bike to complete 10 min. Standing exercise limited due to fatigue and pain. Pt did ambulate 175 feet with st cane before needing a rest break    Activity Tolerance  Activity Tolerance: Patient tolerated treatment well    Patient Education  Patient Education: HEP  Pt verbalized/demonstrated good understanding:     [x] Yes         [] No, pt required further clarification.       Post Treatment Pain:  8/10      Plan  Plan Frequency: 2  Plan weeks: 5       Goals  (Total # of Visits to Date: 5)      Short Term Goals  Time Frame for Short Term Goals: 3 weeks  Short Term Goal 1: Patient will be initiated with a HEP-MET  Short Term Goal 2: Patient will tolerate 20-30 minutes

## 2024-05-17 ENCOUNTER — APPOINTMENT (OUTPATIENT)
Dept: PHYSICAL THERAPY | Age: 54
End: 2024-05-17
Payer: MEDICAID

## 2024-05-20 ENCOUNTER — HOSPITAL ENCOUNTER (OUTPATIENT)
Dept: PHYSICAL THERAPY | Age: 54
Setting detail: THERAPIES SERIES
Discharge: HOME | End: 2024-05-20
Payer: MEDICAID

## 2024-05-20 PROCEDURE — 97110 THERAPEUTIC EXERCISES: CPT

## 2024-05-20 NOTE — PROGRESS NOTES
Phone: [phone]                 [facility]           Fax: [phone]                           Outpatient Physical Therapy                                                                            Daily Note    Patient: Nirav Elmore : 1970  [facility] #: [id number]   Referring Physician: Joseline Fish APRN -*  Date: 2024    Diagnosis: Reduced mobility, Z74.09  Treatment Diagnosis: Generalized weakness, decreased activity endurance    PT Insurance Information: [facility]  Total # of Visits Approved: 10 Per Physician Order  Total # of Visits to Date: 6  No Show: 0  Canceled Appointment: 0    24 Plan of Care/Recert Due    Pre-Treatment Pain:  6/10  Subjective: Pt 6/10 pain. Pt states she had injections last Friday and she feels better.    Exercises:  Exercise 2: Sci-fit x10 min  Exercise 3: Seated exercises B LE x10  Exercise 4: STS 10x2  Exercise 5: Toe taps seated B LE x10 on 1\" step.  Exercise 6: 1 big loop SC  Exercise 7: seated ball rollout (gray ball in lap) fwd and sides 10x 10 sec          Assessment  Assessment: Pt has injections in LB and states she feels a little better. Pt continues to require several  rest during session due to fatigue. Gait 175 feet with 3 standing rest needed to complete. Continue to review HEP as tolerated    Activity Tolerance  Activity Tolerance: Patient tolerated treatment well    Patient Education  Patient Education: HEP  Pt verbalized/demonstrated good understanding:     [x] Yes         [] No, pt required further clarification.       Post Treatment Pain:  6/10      Plan  Plan Frequency: 2  Plan weeks: 5       Goals  (Total # of Visits to Date: 6)      Short Term Goals  Time Frame for Short Term Goals: 3 weeks  Short Term Goal 1: Patient will be initiated with a HEP-MET  Short Term Goal 2: Patient will tolerate 20-30 minutes of therex/act to improve LE strength and endurance for ADLs.-progressing    Long Term Goals  Time Frame for Long Term Goals : 5

## 2024-05-22 ENCOUNTER — HOSPITAL ENCOUNTER (OUTPATIENT)
Dept: PHYSICAL THERAPY | Age: 54
Setting detail: THERAPIES SERIES
Discharge: HOME | End: 2024-05-22
Payer: MEDICAID

## 2024-05-22 PROCEDURE — 97110 THERAPEUTIC EXERCISES: CPT

## 2024-05-22 NOTE — PROGRESS NOTES
Phone: [phone]                 [facility]           Fax: [phone]                           Outpatient Physical Therapy                                                                            Daily Note    Patient: Nirav Elmore : 1970  [facility] #: [id number]   Referring Physician: Joseline Fish APRN -*  Date: 2024     Treatment Diagnosis: Generalized weakness, decreased activity endurance    PT Insurance Information: [facility]  Total # of Visits Approved: 10 Per Physician Order  Total # of Visits to Date: 7  No Show: 0  Canceled Appointment: 0    24 Plan of Care/Recert Due    Pre-Treatment Pain:  10  Subjective: Pt reports she does feel improvements with the injections she feels.  Pt rates current pain a 6/10.    Exercises:  Exercise 2: Sci-fit x10 min  Exercise 4: STS 10x2  Exercise 5: Toe taps seated B LE x10 on 1\" step.  Exercise 6: 1 big loop SC  Exercise 7: seated ball rollout (gray ball in lap) fwd and sides 10x 10 sec    Assessment  Assessment: Pt continues to require several rest breaks d/t fatigue and inhaler breaks.  Pt requires moderate compensation for standing from chair height.    Activity Tolerance  Activity Tolerance: Patient tolerated treatment well    Patient Education  Patient Education: HEP  Pt verbalized/demonstrated good understanding:     [x] Yes         [] No, pt required further clarification.     Post Treatment Pain:  610    Plan  Plan Frequency: 2  Plan weeks: 5     Goals  (Total # of Visits to Date: 7)      Short Term Goals  Time Frame for Short Term Goals: 3 weeks  Short Term Goal 1: Patient will be initiated with a HEP-MET  Short Term Goal 2: Patient will tolerate 20-30 minutes of therex/act to improve LE strength and endurance for ADLs.-progressing    Long Term Goals  Time Frame for Long Term Goals : 5 weeks  Long Term Goal 1: Patient will be independent and compliant with a HEP  Long Term Goal 2: Patient will improve bilateral LE strength to >/=

## 2024-05-29 ENCOUNTER — HOSPITAL ENCOUNTER (OUTPATIENT)
Dept: PHYSICAL THERAPY | Age: 54
Setting detail: THERAPIES SERIES
Discharge: HOME | End: 2024-05-29
Payer: MEDICAID

## 2024-05-29 PROCEDURE — 97110 THERAPEUTIC EXERCISES: CPT

## 2024-05-30 NOTE — PROGRESS NOTES
Phone: [phone]                 [facility]           Fax: [phone]                           Outpatient Physical Therapy                                                                            Daily Note    Patient: Nirav Elmore : 1970  CSN #: [id number]   Referring Physician: Joseline Fish APRN -*  Date: 2024    Diagnosis: Reduced mobility, Z74.09  Treatment Diagnosis: Generalized weakness, decreased activity endurance    PT Insurance Information: [facility]  Total # of Visits Approved: 10 Per Physician Order  Total # of Visits to Date: 8  No Show: 0  Canceled Appointment: 0    24 Plan of Care/Recert Due    Pre-Treatment Pain:  5/10  Subjective: Pt reports with 5/10 pain. States she was pretty active this weekend and ws sore all over    Exercises:  Exercise 1: HEP: seated exercises: LAQ, hip flexion, heel/toe raises (performed x5 ea today)  Exercise 2: Sci-fit x10 min  Exercise 3: Seated exercises B LE x10  Exercise 4: STS 10x2  Exercise 5: Toe taps seated B LE x10 on 1\" step.  Exercise 6: 2 big loop SC  Exercise 7: seated ball rollout (gray ball in lap) fwd and sides 10x 10 sec  Exercise 8: sink exercise  Exercise 9: side walking along island           Assessment  Assessment: Progressed gait distance and added a few more standing exercise with no issues. Pt continues to require several rest but pain complais are less. Will progress as tolerated    Activity Tolerance  Activity Tolerance: Patient tolerated treatment well    Patient Education  Patient Education: HEP  Pt verbalized/demonstrated good understanding:     [x] Yes         [] No, pt required further clarification.       Post Treatment Pain:  6/10      Plan  Plan Frequency: 2  Plan weeks: 5       Goals  (Total # of Visits to Date: 8)      Short Term Goals  Time Frame for Short Term Goals: 3 weeks  Short Term Goal 1: Patient will be initiated with a HEP-MET  Short Term Goal 2: Patient will tolerate 20-30 minutes of

## 2024-05-31 ENCOUNTER — HOSPITAL ENCOUNTER (OUTPATIENT)
Dept: PHYSICAL THERAPY | Age: 54
Setting detail: THERAPIES SERIES
Discharge: HOME | End: 2024-05-31
Payer: MEDICAID

## 2024-05-31 PROCEDURE — 97110 THERAPEUTIC EXERCISES: CPT

## 2024-05-31 NOTE — PROGRESS NOTES
Phone: [phone]                 [facility]           Fax: [phone]                           Outpatient Physical Therapy                                                                            Daily Note    Patient: Nirav Elmore : 1970  [facility] #: [id number]   Referring Physician: Joseline Fish APRN -*  Date: 2024     Treatment Diagnosis: Generalized weakness, decreased activity endurance    PT Insurance Information: [facility]  Total # of Visits Approved: 10 Per Physician Order  Total # of Visits to Date: 9  No Show: 0  Canceled Appointment: 0    24 Plan of Care/Recert Due    Pre-Treatment Pain:  5/10  Subjective: Pt states her and her friend went up to [place] to sit on the beach so she is a little sore from that. Pt rates current pain a /10.    Exercises:  Exercise 2: Sci-fit x10 min 3.5  Exercise 3: Seated exercises B LE x10  Exercise 4: STS 10x2  Exercise 6: 2 big loop SC  Exercise 7: seated ball rollout (gray ball in lap) fwd and sides 10x 10 sec    Assessment  Assessment: Pt continues to require frequent standing rest breaks d/t discomfort and fatigue.  Encouraged endurance walking at home.    Activity Tolerance  Activity Tolerance: Patient tolerated treatment well    Patient Education  Patient Education: HEP  Pt verbalized/demonstrated good understanding:     [x] Yes         [] No, pt required further clarification.     Post Treatment Pain:  /10    Plan  Plan Frequency: 2  Plan weeks: 5     Goals  (Total # of Visits to Date: 9)      Short Term Goals  Time Frame for Short Term Goals: 3 weeks  Short Term Goal 1: Patient will be initiated with a HEP-MET  Short Term Goal 2: Patient will tolerate 20-30 minutes of therex/act to improve LE strength and endurance for ADLs.-progressing    Long Term Goals  Time Frame for Long Term Goals : 5 weeks  Long Term Goal 1: Patient will be independent and compliant with a HEP  Long Term Goal 2: Patient will improve bilateral LE strength

## 2024-06-03 ENCOUNTER — HOSPITAL ENCOUNTER (OUTPATIENT)
Dept: PHYSICAL THERAPY | Age: 54
Setting detail: THERAPIES SERIES
Discharge: HOME | End: 2024-06-03
Payer: MEDICAID

## 2024-06-03 PROCEDURE — 97110 THERAPEUTIC EXERCISES: CPT

## 2024-06-03 NOTE — PROGRESS NOTES
Phone: [phone]                 [facility]           Fax: [phone]                           Outpatient Physical Therapy                                                                            Daily Note    Patient: Nirav Elmore : 1970  CSN #: [id number]   Referring Physician: Joseline Fish APRN -*  Date: 6/3/2024    Diagnosis: Reduced mobility, Z74.09  Treatment Diagnosis: Generalized weakness, decreased activity endurance    PT Insurance Information: [facility]  Total # of Visits Approved: 10 Per Physician Order  Total # of Visits to Date: 10  No Show: 0  Canceled Appointment: 0    24 Plan of Care/Recert Due    Pre-Treatment Pain:  2/10  Subjective: Pt states her pain is 3/10    Exercises:  Exercise 2: Sci-fit x10 min 3.5  Exercise 3: Seated exercises B LE x10  Exercise 4: STS 10x2  Exercise 6: 2 big loop SC  Exercise 7: seated ball rollout (gray ball in lap) fwd and sides 10x 10 sec  Exercise 8: sink exercise  Exercise 9: side walking along island          Assessment  Assessment: Pt with mild pain today. Continue to focus on progressing strengthening and endurance exercise.Fatigue and SOB continue with exercise. Reviewed HEP with good understanding    Activity Tolerance  Activity Tolerance: Patient tolerated treatment well    Patient Education  Patient Education: HEP  Pt verbalized/demonstrated good understanding:     [x] Yes         [] No, pt required further clarification.       Post Treatment Pain:  2/10      Plan  Plan Frequency: 2  Plan weeks: 5       Goals  (Total # of Visits to Date: 10)      Short Term Goals  Time Frame for Short Term Goals: 3 weeks  Short Term Goal 1: Patient will be initiated with a HEP-MET  Short Term Goal 2: Patient will tolerate 20-30 minutes of therex/act to improve LE strength and endurance for ADLs.-progressing    Long Term Goals  Time Frame for Long Term Goals : 5 weeks  Long Term Goal 1: Patient will be independent and compliant with a HEP  Long

## 2024-06-05 ENCOUNTER — HOSPITAL ENCOUNTER (OUTPATIENT)
Dept: PHYSICAL THERAPY | Age: 54
Setting detail: THERAPIES SERIES
Discharge: HOME | End: 2024-06-05
Payer: MEDICAID

## 2024-06-05 NOTE — PROGRESS NOTES
[facility]  Inpatient/Observation/Outpatient Rehabilitation    Date: 2024  Patient Name: Nirav Elmore       [] Inpatient Acute/Observation       [x]  Outpatient  : 1970       Plan of Care/Recert ends    [] Pt no showed for scheduled appointment    [] As a reminder, pt was contacted/attempted contact via phone of upcoming appointments.    [] Pt refused/declined therapy at this time due to:           [x] Pt cancelled due to:  [] No Reason Given   [x] Sick/ill   [] Other:     [] Pt does not require skilled services due to:      Therapist/Assistant will attempt to see this patient, at our earliest opportunity.       Jerome Mcclendon Date: 2024

## 2024-06-11 ENCOUNTER — HOSPITAL ENCOUNTER (OUTPATIENT)
Dept: PHYSICAL THERAPY | Age: 54
Setting detail: THERAPIES SERIES
Discharge: HOME | End: 2024-06-11
Payer: MEDICAID

## 2024-06-11 PROCEDURE — 97110 THERAPEUTIC EXERCISES: CPT

## 2024-06-12 NOTE — PROGRESS NOTES
Phone: [phone]                 [facility]           Fax: [phone]                           Outpatient Physical Therapy                                                                            Daily Note    Patient: Nirav Elmore : 1970  [facility] #: [id number]   Referring Physician: Joseline Fish APRN -*  Date: 2024     Treatment Diagnosis: Generalized weakness, decreased activity endurance    PT Insurance Information: [place]  Total # of Visits Approved: 10 Per Physician Order  Total # of Visits to Date: 11  No Show: 0  Canceled Appointment: 1    24 Plan of Care/Recert Due    Pre-Treatment Pain:  3/10  Subjective: Pt states she feels the injections helped but she still has some back discomfort.  Pt rates current pain a 2-310.    Exercises:  Exercise 2: Sci-fit x10 min 3.5  Exercise 4: STS 10x2  Exercise 5: Toe taps seated B LE x10 on 6\" step.  Exercise 6: 2 big loop SC  Exercise 9: side walking along island  Exercise 10: FSU 10x R 6x L 6 inch    Assessment  Assessment: Pt able to complete 10 step ups on R side today using HR assist but struggles with L foot step ups.  Frequent rest breaks remain but endurance slowly improving.    Activity Tolerance  Activity Tolerance: Patient tolerated treatment well    Patient Education  Patient Education: HEP  Pt verbalized/demonstrated good understanding:     [x] Yes         [] No, pt required further clarification.     Post Treatment Pain:  0/10    Plan  Plan Frequency: 2  Plan weeks: 5     Goals  (Total # of Visits to Date: 11)      Short Term Goals  Time Frame for Short Term Goals: 3 weeks  Short Term Goal 1: Patient will be initiated with a HEP-MET  Short Term Goal 2: Patient will tolerate 20-30 minutes of therex/act to improve LE strength and endurance for ADLs.-progressing    Long Term Goals  Time Frame for Long Term Goals : 5 weeks  Long Term Goal 1: Patient will be independent and compliant with a HEP  Long Term Goal 2: Patient will

## 2024-06-14 ENCOUNTER — HOSPITAL ENCOUNTER (OUTPATIENT)
Dept: PHYSICAL THERAPY | Age: 54
Setting detail: THERAPIES SERIES
Discharge: HOME | End: 2024-06-14
Payer: MEDICAID

## 2024-06-14 PROCEDURE — 97110 THERAPEUTIC EXERCISES: CPT

## 2024-06-14 NOTE — PROGRESS NOTES
with a HEP-MET  Short Term Goal 2: Patient will tolerate 20-30 minutes of therex/act to improve LE strength and endurance for ADLs.-progressing    Long Term Goals  Time Frame for Long Term Goals : 5 weeks  Long Term Goal 1: Patient will be independent and compliant with a HEP  Long Term Goal 2: Patient will improve bilateral LE strength to >/= 4/5 in all major joints and planes.  Long Term Goal 3: Patient will improve TUG test time to <20 seconds.  Long Term Goal 4: Patient will be able to perform 5 sit to stands without UE assist.  Long Term Goal 5: Patient will be able to ambulate 200' before requiring a seated rest break.=progressing    Minutes Tracking:  Time In: 1025  Time Out: 1115  Minutes: 50      Telma Kidd PTA     Date: 6/14/2024

## 2024-06-19 ENCOUNTER — HOSPITAL ENCOUNTER (OUTPATIENT)
Dept: PHYSICAL THERAPY | Age: 54
Setting detail: THERAPIES SERIES
Discharge: HOME | End: 2024-06-19
Payer: MEDICAID

## 2024-06-19 ENCOUNTER — OFFICE VISIT (OUTPATIENT)
Dept: PULMONOLOGY | Age: 54
End: 2024-06-19
Payer: MEDICAID

## 2024-06-19 VITALS
DIASTOLIC BLOOD PRESSURE: 93 MMHG | RESPIRATION RATE: 16 BRPM | HEART RATE: 72 BPM | SYSTOLIC BLOOD PRESSURE: 160 MMHG | TEMPERATURE: 96.7 F | HEIGHT: 66 IN | WEIGHT: 293 LBS | OXYGEN SATURATION: 98 % | BODY MASS INDEX: 47.09 KG/M2

## 2024-06-19 DIAGNOSIS — R06.09 POST-COVID-19 SYNDROME MANIFESTING AS CHRONIC DYSPNEA: ICD-10-CM

## 2024-06-19 DIAGNOSIS — I89.0 CHRONIC ACQUIRED LYMPHEDEMA: ICD-10-CM

## 2024-06-19 DIAGNOSIS — U09.9 POST-COVID-19 SYNDROME MANIFESTING AS CHRONIC DYSPNEA: ICD-10-CM

## 2024-06-19 DIAGNOSIS — Z98.84 HISTORY OF BARIATRIC SURGERY: ICD-10-CM

## 2024-06-19 DIAGNOSIS — J45.50 SEVERE PERSISTENT ASTHMA WITHOUT COMPLICATION: Primary | ICD-10-CM

## 2024-06-19 DIAGNOSIS — E66.01 CLASS 3 SEVERE OBESITY DUE TO EXCESS CALORIES WITH SERIOUS COMORBIDITY AND BODY MASS INDEX (BMI) OF 60.0 TO 69.9 IN ADULT (HCC): ICD-10-CM

## 2024-06-19 PROCEDURE — 99213 OFFICE O/P EST LOW 20 MIN: CPT | Performed by: INTERNAL MEDICINE

## 2024-06-19 PROCEDURE — 97110 THERAPEUTIC EXERCISES: CPT

## 2024-06-19 PROCEDURE — 3080F DIAST BP >= 90 MM HG: CPT | Performed by: INTERNAL MEDICINE

## 2024-06-19 PROCEDURE — G8427 DOCREV CUR MEDS BY ELIG CLIN: HCPCS | Performed by: INTERNAL MEDICINE

## 2024-06-19 PROCEDURE — 3077F SYST BP >= 140 MM HG: CPT | Performed by: INTERNAL MEDICINE

## 2024-06-19 PROCEDURE — 1036F TOBACCO NON-USER: CPT | Performed by: INTERNAL MEDICINE

## 2024-06-19 PROCEDURE — 3017F COLORECTAL CA SCREEN DOC REV: CPT | Performed by: INTERNAL MEDICINE

## 2024-06-19 PROCEDURE — G8417 CALC BMI ABV UP PARAM F/U: HCPCS | Performed by: INTERNAL MEDICINE

## 2024-06-19 RX ORDER — AMOXICILLIN 250 MG
CAPSULE ORAL
COMMUNITY
Start: 2024-04-26

## 2024-06-19 RX ORDER — ALBUTEROL SULFATE 90 UG/1
2 AEROSOL, METERED RESPIRATORY (INHALATION) EVERY 6 HOURS PRN
Qty: 1 EACH | Refills: 11 | Status: SHIPPED | OUTPATIENT
Start: 2024-06-19

## 2024-06-19 RX ORDER — MONTELUKAST SODIUM 10 MG/1
10 TABLET ORAL NIGHTLY
Qty: 90 TABLET | Refills: 3 | Status: SHIPPED | OUTPATIENT
Start: 2024-06-19

## 2024-06-19 RX ORDER — LANOLIN ALCOHOL/MO/W.PET/CERES
100 CREAM (GRAM) TOPICAL DAILY
COMMUNITY
Start: 2024-04-16

## 2024-06-19 RX ORDER — ALBUTEROL SULFATE 2.5 MG/3ML
2.5 SOLUTION RESPIRATORY (INHALATION) EVERY 6 HOURS PRN
Qty: 360 ML | Refills: 11 | Status: SHIPPED | OUTPATIENT
Start: 2024-06-19

## 2024-06-19 ASSESSMENT — ENCOUNTER SYMPTOMS
RESPIRATORY NEGATIVE: 1
EYES NEGATIVE: 1
GASTROINTESTINAL NEGATIVE: 1

## 2024-06-19 NOTE — PATIENT INSTRUCTIONS
SURVEY:    You may be receiving a survey from [employer] regarding your visit today.    Please complete the survey to enable us to provide the highest quality of care to you and your family.    If you cannot score us a very good on any question, please call the office to discuss how we could have made your experience a very good one.    Thank you.      Please recheck your blood pressure when you go home and make sure you take your prescribed medication if applicable .  Please follow up with your PCP or ER if needed.

## 2024-06-19 NOTE — PROGRESS NOTES
Phone: [phone]                 [facility]           Fax: [phone]                           Outpatient Physical Therapy                                                                            Daily Note    Patient: Nirav Elmore : 1970  [facility] #: [id number]   Referring Physician: Joseline Fish APRN -*  Date: 2024       Treatment Diagnosis: Generalized weakness, decreased activity endurance    PT Insurance Information: [facility]  Total # of Visits Approved: 10 Per Physician Order  Total # of Visits to Date: 13  No Show: 0  Canceled Appointment: 1    24 Plan of Care/Recert Due    Pre-Treatment Pain:  2/10  Subjective: Pt. reports overall feeling endurance is slowly getting better. Pt. continues with LBP/ pressure but minimal.    Exercises:  Exercise 2: Sci-fit x10 min 3.5  Exercise 4: STS 10x2  Exercise 5: Toe taps seated B LE x10 on 6\" step.  Exercise 6: 2 big loop SC  Exercise 9: side walking along island  Exercise 10: FSU 10x R 6x L 6 inch- held this date      Assessment  Body Structures, Functions, Activity Limitations Requiring Skilled Therapeutic Intervention: Decreased functional mobility , Decreased ADL status, Decreased strength, Decreased endurance, Decreased balance, Decreased high-level IADLs, Decreased posture, Increased pain  Assessment: PT. able to complete 5 STS but continues to require UE use. Seated B LE strength 4-/5 in all avaliable planes. Pt. is able to tolerate 35-40' therapy session but does require frequent RB d/t fatigue. Will continue to progress per pt. tolerance.    Activity Tolerance  Activity Tolerance: Patient tolerated treatment well    Patient Education  Patient Education: HEP  Pt verbalized/demonstrated good understanding:     [x] Yes         [] No, pt required further clarification.       Post Treatment Pain:  2/10      Plan  Plan Frequency: 2  Plan weeks: 5       Goals  (Total # of Visits to Date: 13)      Short Term Goals  Time Frame for Short

## 2024-06-19 NOTE — PROGRESS NOTES
right scapula    Closed dislocation of glenohumeral joint    Shoulder fracture, right    Morbid obesity with BMI of 60.0-69.9, adult ([facility])    LUQ abdominal pain    S/P laparoscopic sleeve gastrectomy    Subluxation of tendon of long head of biceps    Prediabetes    Headaches due to old head injury    Hearing loss    Lumbar stenosis    TBI (traumatic brain injury) ([facility])    Hyperparathyroidism (HCC)    Iron malabsorption    OAB (overactive bladder)    Anemia    JOSÉ (acute kidney injury) ([facility])    Wound of thigh         Plan:      Continue bronchodilators  Discussed strategies for management of asthma, including evaluation and descalation of therapy  Avoid environmental triggers  Refilled bronchodilators.  Weight loss.  Will help exercise capacity and asthma control.  Encouraged fall vaccines.  Return to clinic in 12 months    Orders Placed This Encounter   Medications    albuterol (PROVENTIL) (2.5 MG/3ML) 0.083% nebulizer solution     Sig: Take 3 mLs by nebulization every 6 hours as needed for Wheezing or Shortness of Breath     Dispense:  360 mL     Refill:  11    albuterol sulfate HFA (PROVENTIL;VENTOLIN;PROAIR) 108 (90 Base) MCG/ACT inhaler     Sig: Inhale 2 puffs into the lungs every 6 hours as needed for Wheezing or Shortness of Breath     Dispense:  1 each     Refill:  11    mometasone-formoterol (DULERA) 200-5 MCG/ACT inhaler     Sig: Inhale 2 puffs into the lungs in the morning and 2 puffs in the evening.     Dispense:  1 each     Refill:  11    montelukast (SINGULAIR) 10 MG tablet     Sig: Take 1 tablet by mouth nightly at bedtime.     Dispense:  90 tablet     Refill:  3     No orders of the defined types were placed in this encounter.    Return in about 1 year (around 6/19/2025).       Electronically signed by Michael Hsu DO on 6/19/2024at 4:56 PM

## 2024-06-20 ENCOUNTER — HOSPITAL ENCOUNTER (OUTPATIENT)
Age: 54
Discharge: HOME | End: 2024-06-20
Payer: MEDICAID

## 2024-06-20 DIAGNOSIS — D63.1 ANEMIA IN STAGE 5 CHRONIC KIDNEY DISEASE, NOT ON CHRONIC DIALYSIS (HCC): ICD-10-CM

## 2024-06-20 DIAGNOSIS — N18.5 ANEMIA IN STAGE 5 CHRONIC KIDNEY DISEASE, NOT ON CHRONIC DIALYSIS (HCC): ICD-10-CM

## 2024-06-20 DIAGNOSIS — N18.5 CKD (CHRONIC KIDNEY DISEASE) STAGE 5, GFR LESS THAN 15 ML/MIN (HCC): ICD-10-CM

## 2024-06-20 DIAGNOSIS — I12.9 HYPERTENSIVE RENAL DISEASE, STAGE 1 THROUGH STAGE 4 OR UNSPECIFIED CHRONIC KIDNEY DISEASE: ICD-10-CM

## 2024-06-20 LAB
ANION GAP SERPL CALCULATED.3IONS-SCNC: 11 MMOL/L (ref 9–17)
BUN SERPL-MCNC: 43 MG/DL (ref 6–20)
BUN/CREAT SERPL: 12 (ref 9–20)
CALCIUM SERPL-MCNC: 9 MG/DL (ref 8.6–10.4)
CHLORIDE SERPL-SCNC: 110 MMOL/L (ref 98–107)
CO2 SERPL-SCNC: 19 MMOL/L (ref 20–31)
CREAT SERPL-MCNC: 3.7 MG/DL (ref 0.5–0.9)
GFR, ESTIMATED: 14 ML/MIN/1.73M2
GLUCOSE SERPL-MCNC: 99 MG/DL (ref 70–99)
PHOSPHATE SERPL-MCNC: 3.5 MG/DL (ref 2.6–4.5)
POTASSIUM SERPL-SCNC: 4.9 MMOL/L (ref 3.7–5.3)
SODIUM SERPL-SCNC: 140 MMOL/L (ref 135–144)

## 2024-06-20 PROCEDURE — 36415 COLL VENOUS BLD VENIPUNCTURE: CPT

## 2024-06-20 PROCEDURE — 80048 BASIC METABOLIC PNL TOTAL CA: CPT

## 2024-06-20 PROCEDURE — 83970 ASSAY OF PARATHORMONE: CPT

## 2024-06-20 PROCEDURE — 84100 ASSAY OF PHOSPHORUS: CPT

## 2024-06-20 NOTE — PLAN OF CARE
[facility]           Phone: [phone]             Outpatient Physical Therapy  Fax: [phone]                                           Date: 2024  Patient: Nirav Elmore : 1970 [facility] #: [id number]   Referring Physician: Joseline Fish APRN -CNP      [] Plan of Care   [x] Updated Plan of Care    Dates of Service to Include: 2024 to 24    Diagnosis:        Rehab (Treatment) Diagnosis:  Generalized weakness, decreased activity endurance         Onset Date:       Attendance  Total # of Visits to Date: 13 No Show: 0 Canceled Appointment: 1    Assessment  Body Structures, Functions, Activity Limitations Requiring Skilled Therapeutic Intervention: Decreased functional mobility , Decreased ADL status, Decreased strength, Decreased endurance, Decreased balance, Decreased high-level IADLs, Decreased posture, Increased pain  Assessment: PT. able to complete 5 STS but continues to require UE use. Seated B LE strength 4-/5 in all avaliable planes. Pt. is able to tolerate 35-40' therapy session but does require frequent RB d/t fatigue. Will continue to progress per pt. tolerance.      Goals  Short Term Goals  Time Frame for Short Term Goals: 3 weeks  Short Term Goal 1: Patient will be initiated with a HEP-MET  Short Term Goal 2: Patient will tolerate 20-30 minutes of therex/act to improve LE strength and endurance for ADLs.-MET  Long Term Goals  Time Frame for Long Term Goals : 5 weeks  Long Term Goal 1: Patient will be independent and compliant with a HEP  Long Term Goal 2: Patient will improve bilateral LE strength to >/= 4/5 in all major joints and planes.- progressing  Long Term Goal 3: Patient will improve TUG test time to <20 seconds.  Long Term Goal 4: Patient will be able to perform 5 sit to stands without UE assist.-progressing  Long Term Goal 5: Patient will be able to ambulate 200' before

## 2024-06-21 ENCOUNTER — HOSPITAL ENCOUNTER (OUTPATIENT)
Dept: PHYSICAL THERAPY | Age: 54
Setting detail: THERAPIES SERIES
Discharge: HOME | End: 2024-06-21
Payer: MEDICAID

## 2024-06-21 ENCOUNTER — OFFICE VISIT (OUTPATIENT)
Dept: NEPHROLOGY | Age: 54
End: 2024-06-21
Payer: MEDICAID

## 2024-06-21 VITALS
DIASTOLIC BLOOD PRESSURE: 84 MMHG | RESPIRATION RATE: 20 BRPM | TEMPERATURE: 96.3 F | HEART RATE: 102 BPM | SYSTOLIC BLOOD PRESSURE: 135 MMHG | BODY MASS INDEX: 47.09 KG/M2 | WEIGHT: 293 LBS | HEIGHT: 66 IN

## 2024-06-21 DIAGNOSIS — N18.5 ANEMIA IN STAGE 5 CHRONIC KIDNEY DISEASE, NOT ON CHRONIC DIALYSIS (HCC): ICD-10-CM

## 2024-06-21 DIAGNOSIS — I12.9 HYPERTENSIVE RENAL DISEASE, STAGE 1 THROUGH STAGE 4 OR UNSPECIFIED CHRONIC KIDNEY DISEASE: ICD-10-CM

## 2024-06-21 DIAGNOSIS — E87.20 METABOLIC ACIDOSIS: ICD-10-CM

## 2024-06-21 DIAGNOSIS — N18.5 CKD (CHRONIC KIDNEY DISEASE) STAGE 5, GFR LESS THAN 15 ML/MIN (HCC): Primary | ICD-10-CM

## 2024-06-21 DIAGNOSIS — D63.1 ANEMIA IN STAGE 5 CHRONIC KIDNEY DISEASE, NOT ON CHRONIC DIALYSIS (HCC): ICD-10-CM

## 2024-06-21 LAB — PTH-INTACT SERPL-MCNC: 230 PG/ML (ref 15–65)

## 2024-06-21 PROCEDURE — 97110 THERAPEUTIC EXERCISES: CPT

## 2024-06-21 PROCEDURE — G8427 DOCREV CUR MEDS BY ELIG CLIN: HCPCS | Performed by: INTERNAL MEDICINE

## 2024-06-21 PROCEDURE — 3075F SYST BP GE 130 - 139MM HG: CPT | Performed by: INTERNAL MEDICINE

## 2024-06-21 PROCEDURE — 1036F TOBACCO NON-USER: CPT | Performed by: INTERNAL MEDICINE

## 2024-06-21 PROCEDURE — 99213 OFFICE O/P EST LOW 20 MIN: CPT | Performed by: INTERNAL MEDICINE

## 2024-06-21 PROCEDURE — G8417 CALC BMI ABV UP PARAM F/U: HCPCS | Performed by: INTERNAL MEDICINE

## 2024-06-21 PROCEDURE — 3017F COLORECTAL CA SCREEN DOC REV: CPT | Performed by: INTERNAL MEDICINE

## 2024-06-21 PROCEDURE — 3079F DIAST BP 80-89 MM HG: CPT | Performed by: INTERNAL MEDICINE

## 2024-06-21 NOTE — PATIENT INSTRUCTIONS
SURVEY:    Thank you for allowing us to care for you today.    You may be receiving a survey from [facility] regarding your visit today- electronically or via mail.      Please help us by completing the survey as this will provide the needed feedback to ensure we are providing the very best care for you and your family.    If you cannot score us a very good on any question, please call the office to discuss how we could have made your experience a very good one.    Thank you.       STAFF:    Kia Doaln, Lucia Hunt, Lisa Comer      CLINICAL STAFF:    Aracely Garvin LPN, Diana Frye LPN, Karlie Vega LPN, Teri Cruz CMA

## 2024-06-21 NOTE — PROGRESS NOTES
[facility] PHYSICIANS [facility], [facility] KIDNEY AND HYPERTENSION  [address] [address]  Dept: [phone]  Office Progress Note  6/21/2024 12:04 PM      Pt Name:    Nirav Elmore  YOB: 1970  Primary Care Physician:  Joseline Fish, APRN - CNP     Chief Complaint:   Chief Complaint   Patient presents with    Chronic Kidney Disease     Follow up for CKD 5. Denies any symptoms at this time.         Background Information/Interval History:   Patient is 54 yo pleasant AAF whom I am seeing for first time in office. I saw her last week at [facility] where she was admitted with elevated creatinine. Her creat used to run in 1.1 range until 2021. But in Nov 2022 her creat was over 4.  She has chronic lymphedema. pMH morbid obesity weight loss surgery in 2016, HTN. She has taken alevel, advil in the past. She has also been taking relafen 500 mg BID x 5 years.     May 8, 2023: Seeing her for follow-up. Since last office visit she has had kidney biopsy which showed chronic changes including secondary FSGS, arterionephrosclerosis, severe interstitial fibrosis with 80% tubular dropout.  Patient is here for follow-up.  She reports she met with the renal modality educator.  She is interested in in center hemo but at this time patient is overwhelmed with her home situation.  Apparently her apartment building needs to be remodeled and she has to be temporarily relocated to another building.  Patient denies any shortness of breath.  No nausea, vomiting.    June 20, 2023: Here for follow-up. No new complaints. No shortness of breath or chest pain. Still having some appt and living situation issues to due to apartment needing remodeled.     Sept 2023: Pt is here for follow-up. Reports she just started taking her hydralazine last week. Denies any shortness of breath. No urinary complaints. She is requesting refill for sodium bicarbonate.     Feb

## 2024-06-21 NOTE — PROGRESS NOTES
Short Term Goals  Time Frame for Short Term Goals: 3 weeks  Short Term Goal 1: Patient will be initiated with a HEP-MET  Short Term Goal 2: Patient will tolerate 20-30 minutes of therex/act to improve LE strength and endurance for ADLs.-MET    Long Term Goals  Time Frame for Long Term Goals : 5 weeks  Long Term Goal 1: Patient will be independent and compliant with a HEP  Long Term Goal 2: Patient will improve bilateral LE strength to >/= 4/5 in all major joints and planes.- progressing  Long Term Goal 3: Patient will improve TUG test time to <20 seconds.  Long Term Goal 4: Patient will be able to perform 5 sit to stands without UE assist.-progressing  Long Term Goal 5: Patient will be able to ambulate 200' before requiring a seated rest break.=progressing    Minutes Tracking:  Time In: 0930  Time Out: 1015  Minutes: 45  Timed Code Treatment Minutes: 41 Minutes        Shaina Alcaraz, PTA     Date: 6/21/2024

## 2024-06-24 ENCOUNTER — HOSPITAL ENCOUNTER (OUTPATIENT)
Dept: PHYSICAL THERAPY | Age: 54
Setting detail: THERAPIES SERIES
Discharge: HOME | End: 2024-06-24
Payer: MEDICAID

## 2024-06-24 NOTE — PROGRESS NOTES
[facility]  Inpatient/Observation/Outpatient Rehabilitation    Date: 2024  Patient Name: Nirav Elmore        [x]  Outpatient  : 24 Plan of Care/Recert ends     [x] Pt cancelled due to:   [x] Other: Out of town and not sure if she will be back for appt    Therapist/Assistant will attempt to see this patient, at our earliest opportunity.       Mahogany Mendez, PTA Date: 2024

## 2024-07-09 ENCOUNTER — HOSPITAL ENCOUNTER (OUTPATIENT)
Dept: PHYSICAL THERAPY | Age: 54
Setting detail: THERAPIES SERIES
Discharge: HOME | End: 2024-07-09
Payer: MEDICAID

## 2024-07-09 PROCEDURE — 97110 THERAPEUTIC EXERCISES: CPT

## 2024-07-10 NOTE — PROGRESS NOTES
Phone: [phone]                 [facility]           Fax: [phone]                           Outpatient Physical Therapy                                                                            Daily Note    Patient: Nirav Elmore : 1970  [facility] #: [id number]   Referring Physician: Joseline Fish APRN -*  Date: 2024     Treatment Diagnosis: Generalized weakness, decreased activity endurance    PT Insurance Information: [facility]  Total # of Visits Approved: 21 Per Physician Order  Total # of Visits to Date: 15  No Show: 0  Canceled Appointment: 2    24 Plan of Care/Recert Due    Pre-Treatment Pain:  0/10  Subjective: Pt reports she had a few weeks off d/t scheduling and approval issues so shes a little tired. Pt denies current pain but states she is tired.    Exercises:  Exercise 2: Sci-fit x10 min 4.0-- 5 mins 1.0 followed by 5 mins 2.0  Exercise 4: STS 10x2  Exercise 6: 2 big loop SC break after 1 today  Exercise 7: seated ball rollout (gray ball in lap) fwd and sides 10x 10 sec    Assessment  Assessment: COnitnued working on endurance based and funcitonal exercises with incraesed rest breaks needed.  Will conitnue to progress as tolerable.    Activity Tolerance  Activity Tolerance: Patient tolerated treatment well    Patient Education  Patient Education: HEP  Pt verbalized/demonstrated good understanding:     [x] Yes         [] No, pt required further clarification.     Post Treatment Pain:  0/10    Plan  Plan Frequency: 2  Plan weeks: 5     Goals  (Total # of Visits to Date: 15)      Short Term Goals  Time Frame for Short Term Goals: 3 weeks  Short Term Goal 1: Patient will be initiated with a HEP-MET  Short Term Goal 2: Patient will tolerate 20-30 minutes of therex/act to improve LE strength and endurance for ADLs.-MET    Long Term Goals  Time Frame for Long Term Goals : 5 weeks  Long Term Goal 1: Patient will be independent and compliant with a HEP  Long Term Goal 2: Patient

## 2024-07-11 ENCOUNTER — HOSPITAL ENCOUNTER (OUTPATIENT)
Dept: PHYSICAL THERAPY | Age: 54
Setting detail: THERAPIES SERIES
Discharge: HOME | End: 2024-07-11
Payer: MEDICAID

## 2024-07-11 PROCEDURE — 97110 THERAPEUTIC EXERCISES: CPT

## 2024-07-11 PROCEDURE — 97530 THERAPEUTIC ACTIVITIES: CPT

## 2024-07-11 NOTE — PROGRESS NOTES
Phone: [phone]                 [facility]           Fax: [phone]                           Outpatient Physical Therapy                                                                            Daily Note    Patient: Nirav Elmore : 1970  [facility] #: [id number]   Referring Physician: Joseline Fish APRN -*  Date: 2024    Diagnosis: Reduced mobility, Z74.09  Treatment Diagnosis: Generalized weakness, decreased activity endurance    PT Insurance Information: [facility]  Total # of Visits Approved: 21 Per Physician Order  Total # of Visits to Date: 16  No Show: 0  Canceled Appointment: 2    24 Plan of Care/Recert Due    Pre-Treatment Pain:  0/10  Subjective: Pt denies pain, just tired and sore from other stuff going on    Exercises:  Exercise 1: HEP: seated exercises: LAQ, hip flexion, heel/toe raises (performed x5 ea today)  Exercise 2: Sci-fit x10 min Lvl 2  Exercise 4: STS 10x2  Exercise 6: 1 big loop SC  Exercise 7: seated ball rollout fwd and sides 10x 10 sec  Exercise 11: Seated HS stretches BLE 3x20\" ea  Exercise 12: seated thoracic roll stretch x10 ea    Assessment  Assessment: Continued with functional exercises with rest breaks provided prn. Added HS and thoracic roll stretch due to pt reporting tightness, relief noted. Will continue to progress per pt tolerance    Activity Tolerance  Activity Tolerance: Patient tolerated treatment well    Patient Education  Patient Education: HEP  Pt verbalized/demonstrated good understanding:     [x] Yes         [] No, pt required further clarification.    Post Treatment Pain:  0/10      Plan  Plan Frequency: 2  Plan weeks: 5       Goals  (Total # of Visits to Date: 16)      Short Term Goals  Time Frame for Short Term Goals: 3 weeks  Short Term Goal 1: Patient will be initiated with a HEP-MET  Short Term Goal 2: Patient will tolerate 20-30 minutes of therex/act to improve LE strength and endurance for ADLs.-MET    Long Term Goals  Time Frame

## 2024-07-16 ENCOUNTER — HOSPITAL ENCOUNTER (OUTPATIENT)
Dept: PHYSICAL THERAPY | Age: 54
Setting detail: THERAPIES SERIES
Discharge: HOME | End: 2024-07-16
Payer: MEDICAID

## 2024-07-16 PROCEDURE — 97110 THERAPEUTIC EXERCISES: CPT

## 2024-07-17 NOTE — PROGRESS NOTES
weeks  Long Term Goal 1: Patient will be independent and compliant with a HEP  Long Term Goal 2: Patient will improve bilateral LE strength to >/= 4/5 in all major joints and planes.- progressing  Long Term Goal 3: Patient will improve TUG test time to <20 seconds.  Long Term Goal 4: Patient will be able to perform 5 sit to stands without UE assist.-progressing  Long Term Goal 5: Patient will be able to ambulate 200' before requiring a seated rest break.=progressing    Minutes Tracking:  Time In: 1301  Time Out: 1345  Minutes: 44  Timed Code Treatment Minutes: 41 Minutes    Agus Chua, PTA     Date: 7/16/2024

## 2024-07-18 ENCOUNTER — HOSPITAL ENCOUNTER (OUTPATIENT)
Dept: PHYSICAL THERAPY | Age: 54
Setting detail: THERAPIES SERIES
Discharge: HOME | End: 2024-07-18
Payer: MEDICAID

## 2024-07-18 PROCEDURE — 97110 THERAPEUTIC EXERCISES: CPT

## 2024-07-18 PROCEDURE — 97116 GAIT TRAINING THERAPY: CPT

## 2024-07-18 NOTE — PROGRESS NOTES
Phone: [phone]                 [facility]           Fax: [phone]                           Outpatient Physical Therapy                                                                            Daily Note    Patient: Nirav Elmore : 1970  CSN #: [id number]   Referring Physician: Joseline Fish APRN -*  Date: 2024    Diagnosis: Reduced mobility, Z74.09  Treatment Diagnosis: Generalized weakness, decreased activity endurance    PT Insurance Information: [facility]  Total # of Visits Approved: 21 Per Physician Order  Total # of Visits to Date: 18  No Show: 0  Canceled Appointment: 2    24 Plan of Care/Recert Due    Pre-Treatment Pain:  0/10  Subjective: Pt reports feeling better than last session. Denies pain, brought back brace in today.    Exercises:  Exercise 1: HEP: seated exercises: LAQ, hip flexion, heel/toe raises (performed x5 ea today)  Exercise 2: Sci-fit x10 min Lvl 2.5  Exercise 6: 1/2 big loop SC  Exercise 11: Seated HS stretches BLE 3x20\" ea  Exercise 12: seated thoracic roll stretch x10 ea  Exercise 13: TM amb >2 mins at .6, ~1.5 min at .8    Assessment  Assessment: Continued to progress TM ambulation per pt request with fair pt tolerance noted and improved tolerance to TM compared to last session. Pt educated on donning back brace this date, demonstrating and verbalizing understanding. Pt scheduled 3 additional PT visits and would like to continue TM ambulation. Will continue to progress per pt tolerance    Activity Tolerance  Activity Tolerance: Patient tolerated treatment well    Patient Education  Patient Education: HEP  Pt verbalized/demonstrated good understanding:     [x] Yes         [] No, pt required further clarification.    Post Treatment Pain:  0/10      Plan  Plan Frequency: 2  Plan weeks: 5       Goals  (Total # of Visits to Date: 18)      Short Term Goals  Time Frame for Short Term Goals: 3 weeks  Short Term Goal 1: Patient will be initiated with a

## 2024-07-19 NOTE — PLAN OF CARE
[facility]           Phone: [phone]             Outpatient Physical Therapy  Fax: [phone]                                           Date: 2024  Patient: Nirav Elmore : 1970 [facility] #: [id number]   Referring Physician: Joseline Fish APRN -CNP      [] Plan of Care   [x] Updated Plan of Care    Dates of Service to Include: 2024 to 24    Diagnosis:  Reduced mobility, Z74.09     Rehab (Treatment) Diagnosis:  Generalized weakness, decreased activity endurance         Onset Date:       Attendance  Total # of Visits to Date: 18 No Show: 0 Canceled Appointment: 2    Assessment  Assessment: Continued to progress TM ambulation per pt request with fair pt tolerance noted and improved tolerance to TM compared to last session. Pt educated on donning back brace this date, demonstrating and verbalizing understanding. Pt scheduled 3 additional PT visits and would like to continue TM ambulation. Will continue to progress per pt tolerance      Goals  Short Term Goals  Time Frame for Short Term Goals: 3 weeks  Short Term Goal 1: Patient will be initiated with a HEP-MET  Short Term Goal 2: Patient will tolerate 20-30 minutes of therex/act to improve LE strength and endurance for ADLs.-MET  Long Term Goals  Time Frame for Long Term Goals : 5 weeks  Long Term Goal 1: Patient will be independent and compliant with a HEP  Long Term Goal 2: Patient will improve bilateral LE strength to >/= 4/5 in all major joints and planes.- progressing  Long Term Goal 3: Patient will improve TUG test time to <20 seconds.  Long Term Goal 4: Patient will be able to perform 5 sit to stands without UE assist.-progressing  Long Term Goal 5: Patient will be able to ambulate 200' before requiring a seated rest break.=progressing     Prognosis  Therapy Prognosis: Good    Treatment Plan   The patient has 3 visits remaining on this

## 2024-07-23 ENCOUNTER — HOSPITAL ENCOUNTER (OUTPATIENT)
Dept: PHYSICAL THERAPY | Age: 54
Setting detail: THERAPIES SERIES
Discharge: HOME | End: 2024-07-23
Payer: MEDICAID

## 2024-07-23 PROCEDURE — 97110 THERAPEUTIC EXERCISES: CPT

## 2024-07-24 NOTE — PROGRESS NOTES
Phone: [phone]                 [facility]           Fax: [phone]                           Outpatient Physical Therapy                                                                            Daily Note    Patient: Nirav Elmore : 1970  CSN #: [id number]   Referring Physician: Joseline Fish APRN -*  Date: 2024     Treatment Diagnosis: Generalized weakness, decreased activity endurance    PT Insurance Information: [facility]  Total # of Visits Approved: 21 Per Physician Order  Total # of Visits to Date: 19  No Show: 0  Canceled Appointment: 2    24 Plan of Care/Recert Due    Pre-Treatment Pain:  0/10  Subjective: Pt states she had to shop earlier for groceries so shes a little tired. Pt denies pain.    Exercises:  Exercise 2: Sci-fit x10 min Lvl 2.5  Exercise 4: STS 10x2  Exercise 7: seated ball rollout fwd and sides 10x 10 sec  Exercise 11: Seated HS stretches BLE 3x20\" ea  Exercise 12: seated thoracic roll stretch x10 ea  Exercise 13: TM amb >2 mins at .6, ~1.5 min at .8    Assessment  Assessment: Pt able to amb approx 2.5 mins today on TM prior to rest break needed with back pain being limiting factor.  COntinued endurance based exercises with fair tolerance noted.    Activity Tolerance  Activity Tolerance: Patient tolerated treatment well    Patient Education  Patient Education: HEP  Pt verbalized/demonstrated good understanding:     [x] Yes         [] No, pt required further clarification.     Post Treatment Pain:  0/10    Plan  Plan Frequency: 2  Plan weeks: 5     Goals  (Total # of Visits to Date: 19)      Short Term Goals  Time Frame for Short Term Goals: 3 weeks  Short Term Goal 1: Patient will be initiated with a HEP-MET  Short Term Goal 2: Patient will tolerate 20-30 minutes of therex/act to improve LE strength and endurance for ADLs.-MET    Long Term Goals  Time Frame for Long Term Goals : 5 weeks  Long Term Goal 1: Patient will be independent and compliant with a

## 2024-07-25 ENCOUNTER — HOSPITAL ENCOUNTER (OUTPATIENT)
Dept: PHYSICAL THERAPY | Age: 54
Setting detail: THERAPIES SERIES
Discharge: HOME | End: 2024-07-25
Payer: MEDICAID

## 2024-07-25 PROCEDURE — 97110 THERAPEUTIC EXERCISES: CPT

## 2024-07-25 NOTE — PROGRESS NOTES
Phone: [phone]                 [facility]           Fax: [phone]                           Outpatient Physical Therapy                                                                            Daily Note    Patient: Nirav Elmore : 1970  CSN #: [id number]   Referring Physician: Joseline Fish APRN -*  Date: 2024       Treatment Diagnosis: Generalized weakness, decreased activity endurance    PT Insurance Information: [facility]  Total # of Visits Approved: 21 Per Physician Order  Total # of Visits to Date: 20  No Show: 0  Canceled Appointment: 2    24 Plan of Care/Recert Due    Pre-Treatment Pain:  0/10  Subjective: pt reports muscle soreness from carrying things into her home yesterday. denies pain.    Exercises:  Exercise 1: HEP: seated exercises: LAQ, hip flexion, heel/toe raises (performed x5 ea today)  Exercise 2: Sci-fit x15 min Lvl 3.5  Exercise 4: STS 10x2  Exercise 7: seated ball rollout fwd and sides 10x 10 sec  Exercise 11: Seated HS stretches BLE 3x20\" ea  Exercise 12: seated thoracic roll stretch x10 ea  Exercise 13: TM amb ~ 2 mins at .8 x 2 trials    Assessment  Body Structures, Functions, Activity Limitations Requiring Skilled Therapeutic Intervention: Decreased functional mobility , Decreased ADL status, Decreased strength, Decreased endurance, Decreased balance, Decreased high-level IADLs, Decreased posture, Increased pain  Assessment: pt able to amb 2min x 2 on TM prior to rest break this date. frequent rest break throughout due to fatigue. Continue to progress TM training per tolerance.    Activity Tolerance  Activity Tolerance: Patient tolerated treatment well    Patient Education  Patient Education: HEP  Pt verbalized/demonstrated good understanding:     [x] Yes         [] No, pt required further clarification.       Post Treatment Pain:  0/10      Plan  Plan Frequency: 2  Plan weeks: 5       Goals  (Total # of Visits to Date: 20)      Short Term Goals  Time

## 2024-07-31 ENCOUNTER — HOSPITAL ENCOUNTER (OUTPATIENT)
Age: 54
Discharge: HOME | End: 2024-07-31
Payer: MEDICAID

## 2024-07-31 ENCOUNTER — HOSPITAL ENCOUNTER (OUTPATIENT)
Dept: PHYSICAL THERAPY | Age: 54
Setting detail: THERAPIES SERIES
Discharge: HOME | End: 2024-07-31
Payer: MEDICAID

## 2024-07-31 ENCOUNTER — OFFICE VISIT (OUTPATIENT)
Dept: ONCOLOGY | Age: 54
End: 2024-07-31
Payer: MEDICAID

## 2024-07-31 VITALS
BODY MASS INDEX: 47.09 KG/M2 | HEART RATE: 72 BPM | DIASTOLIC BLOOD PRESSURE: 91 MMHG | WEIGHT: 293 LBS | HEIGHT: 66 IN | RESPIRATION RATE: 18 BRPM | SYSTOLIC BLOOD PRESSURE: 160 MMHG | TEMPERATURE: 97.1 F

## 2024-07-31 DIAGNOSIS — D50.9 IRON DEFICIENCY ANEMIA, UNSPECIFIED IRON DEFICIENCY ANEMIA TYPE: Primary | ICD-10-CM

## 2024-07-31 DIAGNOSIS — N18.5 ANEMIA IN STAGE 5 CHRONIC KIDNEY DISEASE, NOT ON CHRONIC DIALYSIS (HCC): ICD-10-CM

## 2024-07-31 DIAGNOSIS — D63.1 ANEMIA IN STAGE 5 CHRONIC KIDNEY DISEASE, NOT ON CHRONIC DIALYSIS (HCC): ICD-10-CM

## 2024-07-31 DIAGNOSIS — D50.9 IRON DEFICIENCY ANEMIA, UNSPECIFIED IRON DEFICIENCY ANEMIA TYPE: ICD-10-CM

## 2024-07-31 DIAGNOSIS — K90.9 IRON MALABSORPTION: ICD-10-CM

## 2024-07-31 LAB
BASOPHILS # BLD: 0.05 K/UL (ref 0–0.2)
BASOPHILS NFR BLD: 1 % (ref 0–2)
EOSINOPHIL # BLD: <0.03 K/UL (ref 0–0.44)
EOSINOPHILS RELATIVE PERCENT: 0 % (ref 1–4)
ERYTHROCYTE [DISTWIDTH] IN BLOOD BY AUTOMATED COUNT: 14.9 % (ref 11.8–14.4)
HCT VFR BLD AUTO: 35.3 % (ref 36.3–47.1)
HGB BLD-MCNC: 10.9 G/DL (ref 11.9–15.1)
IMM GRANULOCYTES # BLD AUTO: <0.03 K/UL (ref 0–0.3)
IMM GRANULOCYTES NFR BLD: 0 %
LYMPHOCYTES NFR BLD: 1.38 K/UL (ref 1.1–3.7)
LYMPHOCYTES RELATIVE PERCENT: 20 % (ref 24–43)
MCH RBC QN AUTO: 29.6 PG (ref 25.2–33.5)
MCHC RBC AUTO-ENTMCNC: 30.9 G/DL (ref 28.4–34.8)
MCV RBC AUTO: 95.9 FL (ref 82.6–102.9)
MONOCYTES NFR BLD: 0.45 K/UL (ref 0.1–1.2)
MONOCYTES NFR BLD: 6 % (ref 3–12)
NEUTROPHILS NFR BLD: 73 % (ref 36–65)
NEUTS SEG NFR BLD: 5.17 K/UL (ref 1.5–8.1)
NRBC BLD-RTO: 0 PER 100 WBC
PLATELET # BLD AUTO: 229 K/UL (ref 138–453)
PMV BLD AUTO: 10.3 FL (ref 8.1–13.5)
RBC # BLD AUTO: 3.68 M/UL (ref 3.95–5.11)
WBC OTHER # BLD: 7.1 K/UL (ref 3.5–11.3)

## 2024-07-31 PROCEDURE — 97110 THERAPEUTIC EXERCISES: CPT

## 2024-07-31 PROCEDURE — 83540 ASSAY OF IRON: CPT

## 2024-07-31 PROCEDURE — G8417 CALC BMI ABV UP PARAM F/U: HCPCS | Performed by: INTERNAL MEDICINE

## 2024-07-31 PROCEDURE — 3077F SYST BP >= 140 MM HG: CPT | Performed by: INTERNAL MEDICINE

## 2024-07-31 PROCEDURE — 82728 ASSAY OF FERRITIN: CPT

## 2024-07-31 PROCEDURE — 1036F TOBACCO NON-USER: CPT | Performed by: INTERNAL MEDICINE

## 2024-07-31 PROCEDURE — 99214 OFFICE O/P EST MOD 30 MIN: CPT | Performed by: INTERNAL MEDICINE

## 2024-07-31 PROCEDURE — 36415 COLL VENOUS BLD VENIPUNCTURE: CPT

## 2024-07-31 PROCEDURE — 3017F COLORECTAL CA SCREEN DOC REV: CPT | Performed by: INTERNAL MEDICINE

## 2024-07-31 PROCEDURE — 85025 COMPLETE CBC W/AUTO DIFF WBC: CPT

## 2024-07-31 PROCEDURE — G8427 DOCREV CUR MEDS BY ELIG CLIN: HCPCS | Performed by: INTERNAL MEDICINE

## 2024-07-31 PROCEDURE — 83550 IRON BINDING TEST: CPT

## 2024-07-31 PROCEDURE — 3080F DIAST BP >= 90 MM HG: CPT | Performed by: INTERNAL MEDICINE

## 2024-07-31 NOTE — PATIENT INSTRUCTIONS
Need cbc and iron studies today   Likely to need IV iron depending on results.   Rv to see me in 6 months with cbc and iron studies prior

## 2024-07-31 NOTE — PROGRESS NOTES
Phone: [phone]                 [facility]           Fax: [phone]                           Outpatient Physical Therapy                                                                            Daily Note    Patient: Nirav Elmore : 1970  CSN #: [id number]   Referring Physician: Joseline Fish APRN -*  Date: 2024    Diagnosis: Reduced mobility, Z74.09  Treatment Diagnosis: Generalized weakness, decreased activity endurance    PT Insurance Information: [facility]  Total # of Visits Approved: 21 Per Physician Order  Total # of Visits to Date: 21  No Show: 0  Canceled Appointment: 2    24 Plan of Care/Recert Due    Pre-Treatment Pain:  0/10  Subjective: Denies pain. States this her last session    Exercises:  Exercise 2: Sci-fit x15 min Lvl 3.5  Exercise 7: seated ball rollout fwd and sides 10x 10 sec  Exercise 12: seated thoracic roll stretch x10 ea  Exercise 13: TM amb ~ 2 mins at .8 x 2 trials-- 5 1/2 min total          Assessment  Assessment: Pt ambulated a tolal of 5 1/2 min on treadmill with 1 rest break needed. Exercise completed. HEP reviewed    Activity Tolerance  Activity Tolerance: Patient tolerated treatment well    Patient Education  Patient Education: HEP  Pt verbalized/demonstrated good understanding:     [x] Yes         [] No, pt required further clarification.       Post Treatment Pain:  0/10      Plan  Plan Frequency: 2  Plan weeks: 5       Goals  (Total # of Visits to Date: 21)      Short Term Goals  Time Frame for Short Term Goals: 3 weeks  Short Term Goal 1: Patient will be initiated with a HEP-MET  Short Term Goal 2: Patient will tolerate 20-30 minutes of therex/act to improve LE strength and endurance for ADLs.-MET    Long Term Goals  Time Frame for Long Term Goals : 5 weeks  Long Term Goal 1: Patient will be independent and compliant with a HEP  Long Term Goal 2: Patient will improve bilateral LE strength to >/= 4/5 in all major joints and planes.-

## 2024-08-01 LAB
FERRITIN SERPL-MCNC: 203 NG/ML (ref 13–150)
IRON SATN MFR SERPL: 16 % (ref 20–55)
IRON SERPL-MCNC: 33 UG/DL (ref 37–145)
TIBC SERPL-MCNC: 210 UG/DL (ref 250–450)
UNSATURATED IRON BINDING CAPACITY: 177 UG/DL (ref 112–347)

## 2024-08-07 ENCOUNTER — TELEPHONE (OUTPATIENT)
Dept: ONCOLOGY | Age: 54
End: 2024-08-07

## 2024-08-07 NOTE — TELEPHONE ENCOUNTER
Called patient to inform her that per Dr. Johnston no IV iron needed at this time.  She verbalizes understanding and will get labs for follow up appointment in 6 months.

## 2024-10-16 RX ORDER — SODIUM BICARBONATE 650 MG/1
1300 TABLET ORAL 3 TIMES DAILY
Qty: 180 TABLET | Refills: 3 | Status: SHIPPED | OUTPATIENT
Start: 2024-10-16

## 2024-10-16 RX ORDER — CARVEDILOL 25 MG/1
25 TABLET ORAL 2 TIMES DAILY WITH MEALS
Qty: 180 TABLET | Refills: 3 | Status: SHIPPED | OUTPATIENT
Start: 2024-10-16

## 2024-10-16 RX ORDER — HYDRALAZINE HYDROCHLORIDE 100 MG/1
100 TABLET, FILM COATED ORAL EVERY 8 HOURS SCHEDULED
Qty: 270 TABLET | Refills: 3 | Status: SHIPPED | OUTPATIENT
Start: 2024-10-16

## 2024-10-16 NOTE — TELEPHONE ENCOUNTER
Patient called requesting refills for Hydralazine, Carvedilol and Sodium Bicarb. Her next appointment is 12/17/24.

## 2024-10-28 ENCOUNTER — CLINICAL DOCUMENTATION (OUTPATIENT)
Dept: PHYSICAL THERAPY | Age: 54
End: 2024-10-28

## 2024-10-28 NOTE — FLOWSHEET NOTE
Phone: [phone]                 [facility]          Fax: [phone]                            Outpatient Physical Therapy                                                                    Discharge Summary    Patient: Nirav Elmore  : 1970  [facility] #: [id number]   Referring Physician: Joseline Fish APRN -CNP       Date Treatment Initiated: 24  Date of Last Treatment: 24    Frequency/Duration  Plan Frequency: 2                   Plan weeks: 5     Treatment Received  [x] HP/CP      [x] Electrical Stim   [x] Therapeutic Exercise      [x] Gait Training  [] Aquatics   [] Ultrasound         [x] Patient Education/HEP   [x] Manual Therapy  [] Traction    [x] Neuro-juan carlos        [x] Soft Tissue Mobs            [x] Therapeutic Activity  [] Iontophoresis                        [] Orthotic casting/fitting      [] Dry Needling  [] Blood Flow Restriction       [] Vasopneumatic Compression    Assessment  Pt ambulated a tolal of 5 1/2 min on treadmill with 1 rest break needed. Exercise completed. HEP reviewed       Goals  Short Term Goals  Time Frame for Short Term Goals: 3 weeks  Short Term Goal 1: Patient will be initiated with a HEP-MET  Short Term Goal 2: Patient will tolerate 20-30 minutes of therex/act to improve LE strength and endurance for ADLs.-MET     Long Term Goals  Time Frame for Long Term Goals : 5 weeks  Long Term Goal 1: Patient will be independent and compliant with a HEP  Long Term Goal 2: Patient will improve bilateral LE strength to >/= 4/5 in all major joints and planes.- progressing  Long Term Goal 3: Patient will improve TUG test time to <20 seconds.  Long Term Goal 4: Patient will be able to perform 5 sit to stands without UE assist.-progressing  Long Term Goal 5: Patient will be able to ambulate 200' before requiring a seated rest break.=progressing      Reason for Discharge  [] Goals Achieved                        []  Poor Follow Through/Attendance                  []

## 2024-11-07 NOTE — PROGRESS NOTES
escape proof reading.  It such instances, actual meaning can be extrapolated by contextual diversion.   154.94

## 2024-12-16 ENCOUNTER — HOSPITAL ENCOUNTER (OUTPATIENT)
Age: 54
Discharge: HOME | End: 2024-12-16
Payer: MEDICAID

## 2024-12-16 DIAGNOSIS — N18.5 CKD (CHRONIC KIDNEY DISEASE) STAGE 5, GFR LESS THAN 15 ML/MIN (HCC): ICD-10-CM

## 2024-12-16 DIAGNOSIS — D63.1 ANEMIA IN STAGE 5 CHRONIC KIDNEY DISEASE, NOT ON CHRONIC DIALYSIS (HCC): ICD-10-CM

## 2024-12-16 DIAGNOSIS — D50.9 IRON DEFICIENCY ANEMIA, UNSPECIFIED IRON DEFICIENCY ANEMIA TYPE: ICD-10-CM

## 2024-12-16 DIAGNOSIS — N18.5 ANEMIA IN STAGE 5 CHRONIC KIDNEY DISEASE, NOT ON CHRONIC DIALYSIS (HCC): ICD-10-CM

## 2024-12-16 DIAGNOSIS — K90.9 IRON MALABSORPTION: ICD-10-CM

## 2024-12-16 DIAGNOSIS — E87.20 METABOLIC ACIDOSIS: ICD-10-CM

## 2024-12-16 DIAGNOSIS — I12.9 HYPERTENSIVE RENAL DISEASE, STAGE 1 THROUGH STAGE 4 OR UNSPECIFIED CHRONIC KIDNEY DISEASE: ICD-10-CM

## 2024-12-16 LAB
ANION GAP SERPL CALCULATED.3IONS-SCNC: 13 MMOL/L (ref 9–16)
BASOPHILS # BLD: 0.03 K/UL (ref 0–0.2)
BASOPHILS NFR BLD: 1 % (ref 0–2)
BUN SERPL-MCNC: 29 MG/DL (ref 6–20)
BUN/CREAT SERPL: 8 (ref 9–20)
CALCIUM SERPL-MCNC: 9.2 MG/DL (ref 8.6–10.4)
CHLORIDE SERPL-SCNC: 107 MMOL/L (ref 98–107)
CO2 SERPL-SCNC: 19 MMOL/L (ref 20–31)
CREAT SERPL-MCNC: 3.5 MG/DL (ref 0.5–0.9)
EOSINOPHIL # BLD: <0.03 K/UL (ref 0–0.44)
EOSINOPHILS RELATIVE PERCENT: 0 % (ref 1–4)
ERYTHROCYTE [DISTWIDTH] IN BLOOD BY AUTOMATED COUNT: 15.9 % (ref 11.8–14.4)
FERRITIN SERPL-MCNC: 270 NG/ML (ref 15–150)
GFR, ESTIMATED: 15 ML/MIN/1.73M2
GLUCOSE SERPL-MCNC: 87 MG/DL (ref 74–99)
HCT VFR BLD AUTO: 34.3 % (ref 36.3–47.1)
HGB BLD-MCNC: 10.2 G/DL (ref 11.9–15.1)
IMM GRANULOCYTES # BLD AUTO: <0.03 K/UL (ref 0–0.3)
IMM GRANULOCYTES NFR BLD: 0 %
IRON SATN MFR SERPL: 16 % (ref 20–55)
IRON SERPL-MCNC: 32 UG/DL (ref 37–145)
LYMPHOCYTES NFR BLD: 1.45 K/UL (ref 1.1–3.7)
LYMPHOCYTES RELATIVE PERCENT: 33 % (ref 24–43)
MCH RBC QN AUTO: 28.7 PG (ref 25.2–33.5)
MCHC RBC AUTO-ENTMCNC: 29.7 G/DL (ref 28.4–34.8)
MCV RBC AUTO: 96.6 FL (ref 82.6–102.9)
MONOCYTES NFR BLD: 0.43 K/UL (ref 0.1–1.2)
MONOCYTES NFR BLD: 10 % (ref 3–12)
NEUTROPHILS NFR BLD: 56 % (ref 36–65)
NEUTS SEG NFR BLD: 2.54 K/UL (ref 1.5–8.1)
NRBC BLD-RTO: 0 PER 100 WBC
PHOSPHATE SERPL-MCNC: 2.9 MG/DL (ref 2.5–4.5)
PLATELET # BLD AUTO: 200 K/UL (ref 138–453)
PMV BLD AUTO: 9.9 FL (ref 8.1–13.5)
POTASSIUM SERPL-SCNC: 4.4 MMOL/L (ref 3.7–5.3)
PTH-INTACT SERPL-MCNC: 180 PG/ML (ref 15–65)
RBC # BLD AUTO: 3.55 M/UL (ref 3.95–5.11)
SODIUM SERPL-SCNC: 139 MMOL/L (ref 136–145)
TIBC SERPL-MCNC: 202 UG/DL (ref 250–450)
UNSATURATED IRON BINDING CAPACITY: 170 UG/DL (ref 112–347)
WBC OTHER # BLD: 4.5 K/UL (ref 3.5–11.3)

## 2024-12-16 PROCEDURE — 80048 BASIC METABOLIC PNL TOTAL CA: CPT

## 2024-12-16 PROCEDURE — 83036 HEMOGLOBIN GLYCOSYLATED A1C: CPT

## 2024-12-16 PROCEDURE — 36415 COLL VENOUS BLD VENIPUNCTURE: CPT

## 2024-12-16 PROCEDURE — 83970 ASSAY OF PARATHORMONE: CPT

## 2024-12-16 PROCEDURE — 82728 ASSAY OF FERRITIN: CPT

## 2024-12-16 PROCEDURE — 83550 IRON BINDING TEST: CPT

## 2024-12-16 PROCEDURE — 83540 ASSAY OF IRON: CPT

## 2024-12-16 PROCEDURE — 84100 ASSAY OF PHOSPHORUS: CPT

## 2024-12-16 PROCEDURE — 85025 COMPLETE CBC W/AUTO DIFF WBC: CPT

## 2024-12-17 ENCOUNTER — OFFICE VISIT (OUTPATIENT)
Dept: NEPHROLOGY | Age: 54
End: 2024-12-17
Payer: MEDICAID

## 2024-12-17 VITALS
RESPIRATION RATE: 18 BRPM | TEMPERATURE: 97.9 F | WEIGHT: 293 LBS | BODY MASS INDEX: 47.09 KG/M2 | HEART RATE: 81 BPM | SYSTOLIC BLOOD PRESSURE: 141 MMHG | HEIGHT: 66 IN | DIASTOLIC BLOOD PRESSURE: 90 MMHG

## 2024-12-17 DIAGNOSIS — N18.5 ANEMIA IN STAGE 5 CHRONIC KIDNEY DISEASE, NOT ON CHRONIC DIALYSIS (HCC): ICD-10-CM

## 2024-12-17 DIAGNOSIS — D63.1 ANEMIA IN STAGE 5 CHRONIC KIDNEY DISEASE, NOT ON CHRONIC DIALYSIS (HCC): ICD-10-CM

## 2024-12-17 DIAGNOSIS — N18.5 CKD (CHRONIC KIDNEY DISEASE) STAGE 5, GFR LESS THAN 15 ML/MIN (HCC): Primary | ICD-10-CM

## 2024-12-17 DIAGNOSIS — I12.9 HYPERTENSIVE RENAL DISEASE, STAGE 1 THROUGH STAGE 4 OR UNSPECIFIED CHRONIC KIDNEY DISEASE: ICD-10-CM

## 2024-12-17 DIAGNOSIS — E87.20 METABOLIC ACIDOSIS: ICD-10-CM

## 2024-12-17 LAB
EST. AVERAGE GLUCOSE BLD GHB EST-MCNC: 82 MG/DL
HBA1C MFR BLD: 4.5 % (ref 4–6)

## 2024-12-17 PROCEDURE — 99213 OFFICE O/P EST LOW 20 MIN: CPT | Performed by: INTERNAL MEDICINE

## 2024-12-17 PROCEDURE — G8417 CALC BMI ABV UP PARAM F/U: HCPCS | Performed by: INTERNAL MEDICINE

## 2024-12-17 PROCEDURE — 3077F SYST BP >= 140 MM HG: CPT | Performed by: INTERNAL MEDICINE

## 2024-12-17 PROCEDURE — 3079F DIAST BP 80-89 MM HG: CPT | Performed by: INTERNAL MEDICINE

## 2024-12-17 PROCEDURE — 3017F COLORECTAL CA SCREEN DOC REV: CPT | Performed by: INTERNAL MEDICINE

## 2024-12-17 PROCEDURE — G8427 DOCREV CUR MEDS BY ELIG CLIN: HCPCS | Performed by: INTERNAL MEDICINE

## 2024-12-17 PROCEDURE — 1036F TOBACCO NON-USER: CPT | Performed by: INTERNAL MEDICINE

## 2024-12-17 PROCEDURE — G8484 FLU IMMUNIZE NO ADMIN: HCPCS | Performed by: INTERNAL MEDICINE

## 2024-12-17 RX ORDER — OLOPATADINE HYDROCHLORIDE 1 MG/ML
SOLUTION/ DROPS OPHTHALMIC
COMMUNITY
Start: 2024-11-14

## 2024-12-17 RX ORDER — MULTIVIT WITH MINERALS/LUTEIN
TABLET ORAL
COMMUNITY
Start: 2024-11-18

## 2024-12-17 NOTE — PROGRESS NOTES
1/2 to 1 tablet by mouth twice a day if needed for pain for 30 DAYS      atorvastatin (LIPITOR) 80 MG tablet Take 1 tablet by mouth nightly at bedtime.      ondansetron (ZOFRAN-ODT) 4 MG disintegrating tablet       tiZANidine (ZANAFLEX) 4 MG tablet Take 1 tablet by mouth every 8 hours as needed (spasms) 30 tablet 0    amLODIPine (NORVASC) 10 MG tablet take 1 tablet by mouth once daily      EPINEPHrine (EPIPEN) 0.3 MG/0.3ML SOAJ injection Inject 0.3 mLs into the muscle as needed Use as directed for allergic reaction      pantoprazole (PROTONIX) 40 MG tablet take 1 tablet by mouth once daily 30 tablet 3    fluticasone (FLONASE) 50 MCG/ACT nasal spray 2 sprays by Nasal route daily 16 g 11    docusate sodium (COLACE) 100 MG capsule Take 1 capsule by mouth 2 times daily      ferrous sulfate 325 (65 FE) MG tablet Take 1 tablet by mouth daily (with breakfast)      Calcium Citrate-Vitamin D (CALCIUM CITRATE + D PO) Take 500 mg by mouth 3 times daily       No current facility-administered medications for this visit.        Laboratory & Diagnostics:  Old labs  Anti SSA >240  ANCA/GBM/Hep C (-)  SPEP: ok  R 7.9, L 10.2 cm  April 2023: K 4.8, Creat 5.47, Glucose 108, Ca 9.7, bicarbonate 19, UPCR 1.15 g/g  May 2023: K 4.1, Creat 4.3, Ca 9.6, Glucose 96, Hb 10.3,     June 2023: K 3.9, creat 4.33, Ca 9.9, Phos 3.1, Hb 10.6,   Sept 2023: K 4.5, Creat 3.4, Ca 9.8, Hb 10.9  Feb 2024: k 5.0, creat 3.7, , Hb 10.3  June 2024: K 4.9, creat 3.7,   Dec 2024: K 4.4, creat 3.5, BUN 29, Ca 9.2, Phos 2.9, , Hb 10.2, iron saturation 16%, Ferritin 270     Impression/Plan:   1. 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 Creat is high but stable. Now staying around 3.4 to 3.7. no indication to start dialysis yet. She is aware about CKD V however. No uremic signs or symptoms. Electrolytes are stable. She know she has advanced CKD stage V  2. Anemia in CKD: Has received IV iron per hematologist.

## 2024-12-17 NOTE — PATIENT INSTRUCTIONS
SURVEY:    Thank you for allowing us to care for you today.    You may be receiving a survey from [facility] regarding your visit today- electronically or via mail.      Please help us by completing the survey as this will provide the needed feedback to ensure we are providing the very best care for you and your family.    If you cannot score us a very good on any question, please call the office to discuss how we could have made your experience a very good one.    Thank you.       STAFF:    Kia AGUIRRE, Lucia GRAFF, Lisa QUESADA CMA      CLINICAL STAFF:    Aracely CHOI LPN, Diana SERRA LPN, Karlie WHITEHEAD LPN, Teri AGUIRRE

## 2025-03-05 RX ORDER — DOXAZOSIN 4 MG/1
TABLET ORAL
Qty: 180 TABLET | Refills: 3 | Status: SHIPPED | OUTPATIENT
Start: 2025-03-05

## 2025-03-05 RX ORDER — SODIUM BICARBONATE 650 MG/1
TABLET ORAL
Qty: 180 TABLET | Refills: 3 | Status: SHIPPED | OUTPATIENT
Start: 2025-03-05

## 2025-03-05 NOTE — TELEPHONE ENCOUNTER
We received a refill request from [facility] for Cardura and Sodium Bicarb. Next office visit is 3/18/25. Please advise.

## 2025-03-12 RX ORDER — BUMETANIDE 1 MG/1
1 TABLET ORAL 2 TIMES DAILY
Qty: 180 TABLET | Refills: 3 | Status: SHIPPED | OUTPATIENT
Start: 2025-03-12

## 2025-03-18 ENCOUNTER — TELEPHONE (OUTPATIENT)
Dept: ONCOLOGY | Age: 55
End: 2025-03-18

## 2025-03-25 DIAGNOSIS — K90.9 IRON MALABSORPTION: ICD-10-CM

## 2025-03-25 DIAGNOSIS — N18.5 ANEMIA IN STAGE 5 CHRONIC KIDNEY DISEASE, NOT ON CHRONIC DIALYSIS: ICD-10-CM

## 2025-03-25 DIAGNOSIS — D63.1 ANEMIA IN STAGE 5 CHRONIC KIDNEY DISEASE, NOT ON CHRONIC DIALYSIS: ICD-10-CM

## 2025-03-25 DIAGNOSIS — D50.9 IRON DEFICIENCY ANEMIA, UNSPECIFIED IRON DEFICIENCY ANEMIA TYPE: ICD-10-CM

## 2025-05-15 DIAGNOSIS — E87.20 METABOLIC ACIDOSIS: ICD-10-CM

## 2025-05-15 DIAGNOSIS — D63.1 ANEMIA IN STAGE 5 CHRONIC KIDNEY DISEASE, NOT ON CHRONIC DIALYSIS (HCC): ICD-10-CM

## 2025-05-15 DIAGNOSIS — N18.5 CKD (CHRONIC KIDNEY DISEASE) STAGE 5, GFR LESS THAN 15 ML/MIN (HCC): Primary | ICD-10-CM

## 2025-05-15 DIAGNOSIS — I12.9 HYPERTENSIVE RENAL DISEASE, STAGE 1 THROUGH STAGE 4 OR UNSPECIFIED CHRONIC KIDNEY DISEASE: ICD-10-CM

## 2025-05-15 DIAGNOSIS — N18.5 ANEMIA IN STAGE 5 CHRONIC KIDNEY DISEASE, NOT ON CHRONIC DIALYSIS (HCC): ICD-10-CM

## 2025-07-15 ENCOUNTER — HOSPITAL ENCOUNTER (OUTPATIENT)
Dept: GENERAL RADIOLOGY | Age: 55
Discharge: HOME | End: 2025-07-17
Payer: MEDICAID

## 2025-07-15 ENCOUNTER — HOSPITAL ENCOUNTER (OUTPATIENT)
Age: 55
Discharge: HOME | End: 2025-07-15
Payer: MEDICAID

## 2025-07-15 ENCOUNTER — TRANSCRIBE ORDERS (OUTPATIENT)
Dept: ADMISSION | Age: 55
End: 2025-07-15

## 2025-07-15 DIAGNOSIS — N18.5 ANEMIA IN STAGE 5 CHRONIC KIDNEY DISEASE, NOT ON CHRONIC DIALYSIS (HCC): ICD-10-CM

## 2025-07-15 DIAGNOSIS — D50.9 IRON DEFICIENCY ANEMIA, UNSPECIFIED IRON DEFICIENCY ANEMIA TYPE: ICD-10-CM

## 2025-07-15 DIAGNOSIS — M25.561 RIGHT KNEE PAIN, UNSPECIFIED CHRONICITY: ICD-10-CM

## 2025-07-15 DIAGNOSIS — M25.561 RIGHT KNEE PAIN, UNSPECIFIED CHRONICITY: Primary | ICD-10-CM

## 2025-07-15 DIAGNOSIS — D63.1 ANEMIA IN STAGE 5 CHRONIC KIDNEY DISEASE, NOT ON CHRONIC DIALYSIS (HCC): ICD-10-CM

## 2025-07-15 DIAGNOSIS — K90.9 IRON MALABSORPTION: ICD-10-CM

## 2025-07-15 LAB
BASOPHILS # BLD: 0.04 K/UL (ref 0–0.2)
BASOPHILS NFR BLD: 1 % (ref 0–2)
EOSINOPHIL # BLD: 0.17 K/UL (ref 0–0.44)
EOSINOPHILS RELATIVE PERCENT: 3 % (ref 1–4)
ERYTHROCYTE [DISTWIDTH] IN BLOOD BY AUTOMATED COUNT: 14.5 % (ref 11.8–14.4)
FERRITIN SERPL-MCNC: 208 NG/ML (ref 15–150)
HCT VFR BLD AUTO: 32.5 % (ref 36.3–47.1)
HGB BLD-MCNC: 9.9 G/DL (ref 11.9–15.1)
IMM GRANULOCYTES # BLD AUTO: <0.03 K/UL (ref 0–0.3)
IMM GRANULOCYTES NFR BLD: 0 %
IRON SATN MFR SERPL: 16 % (ref 20–55)
IRON SERPL-MCNC: 31 UG/DL (ref 37–145)
LYMPHOCYTES NFR BLD: 1.67 K/UL (ref 1.1–3.7)
LYMPHOCYTES RELATIVE PERCENT: 27 % (ref 24–43)
MCH RBC QN AUTO: 28.8 PG (ref 25.2–33.5)
MCHC RBC AUTO-ENTMCNC: 30.5 G/DL (ref 28.4–34.8)
MCV RBC AUTO: 94.5 FL (ref 82.6–102.9)
MONOCYTES NFR BLD: 0.6 K/UL (ref 0.1–1.2)
MONOCYTES NFR BLD: 10 % (ref 3–12)
NEUTROPHILS NFR BLD: 59 % (ref 36–65)
NEUTS SEG NFR BLD: 3.65 K/UL (ref 1.5–8.1)
NRBC BLD-RTO: 0 PER 100 WBC
PLATELET # BLD AUTO: 236 K/UL (ref 138–453)
PMV BLD AUTO: 10.6 FL (ref 8.1–13.5)
RBC # BLD AUTO: 3.44 M/UL (ref 3.95–5.11)
TIBC SERPL-MCNC: 196 UG/DL (ref 250–450)
UNSATURATED IRON BINDING CAPACITY: 165 UG/DL (ref 112–347)
WBC OTHER # BLD: 6.2 K/UL (ref 3.5–11.3)

## 2025-07-15 PROCEDURE — 83550 IRON BINDING TEST: CPT

## 2025-07-15 PROCEDURE — 36415 COLL VENOUS BLD VENIPUNCTURE: CPT

## 2025-07-15 PROCEDURE — 82728 ASSAY OF FERRITIN: CPT

## 2025-07-15 PROCEDURE — 73560 X-RAY EXAM OF KNEE 1 OR 2: CPT

## 2025-07-15 PROCEDURE — 83540 ASSAY OF IRON: CPT

## 2025-07-15 PROCEDURE — 85025 COMPLETE CBC W/AUTO DIFF WBC: CPT

## (undated) DEVICE — GOWN,AURORA,NONRNF,XL,30/CS: Brand: MEDLINE

## (undated) DEVICE — GLOVE ORANGE PI 8 1/2   MSG9085

## (undated) DEVICE — 4.5 MM INCISOR PLUS ELITE STRAIGHT                                    DISPOSABLE BLADES, SLATE,PACKAGED 6                                    PER BOX, STERILE

## (undated) DEVICE — SHOULDER SUSPENSION KIT 6 PER BOX

## (undated) DEVICE — 1000 S-DRAPE TOWEL DRAPE 10/BX: Brand: STERI-DRAPE™

## (undated) DEVICE — GLOVE ORANGE PI 8   MSG9080

## (undated) DEVICE — GLOVE SURG SZ 65 THK91MIL LTX FREE SYN POLYISOPRENE

## (undated) DEVICE — MEDI-VAC SUCTION HANDLE REGULAR CAPACITY: Brand: CARDINAL HEALTH

## (undated) DEVICE — AMBIENT SUPER TURBOVAC 90: Brand: COBLATION

## (undated) DEVICE — GLOVE ORANGE PI 7 1/2   MSG9075

## (undated) DEVICE — PACK PROCEDURE SURG SVMMC SHLDR ARTHRO PK

## (undated) DEVICE — INTENDED FOR TISSUE SEPARATION, AND OTHER PROCEDURES THAT REQUIRE A SHARP SURGICAL BLADE TO PUNCTURE OR CUT.: Brand: BARD-PARKER ® CARBON RIB-BACK BLADES

## (undated) DEVICE — DRAPE,SHOULDER,BEACH CHAIR,STERILE: Brand: MEDLINE

## (undated) DEVICE — MEDI-VAC NON-CONDUCTIVE SUCTION TUBING 7MM X 6.1M (20 FT.) L: Brand: CARDINAL HEALTH

## (undated) DEVICE — PROTECTOR ULN NRV FOAM DISPOSABLE

## (undated) DEVICE — SHOULDER STABILIZATION KIT,                                    DISPOSABLE 12 PER BOX

## (undated) DEVICE — STERLING OVAL BUR, 4.0 MM: Brand: STERLING

## (undated) DEVICE — CANNULA ARTHSCP L7CM ID8.25MM TRNSLUC THRD FLX W/ NO SQUIRT

## (undated) DEVICE — STERLING XTRASHARP SHAVER GREAT WHITE SHAVER BLADE, 4.2 MM: Brand: STERLING XTRASHARP SHAVER GREAT WHITE

## (undated) DEVICE — SLING ARM L L17 1/2IN D8.5IN COT POLY DLX W/ SHLDR PD

## (undated) DEVICE — DRAPE,U/ SHT,SPLIT,PLAS,STERIL: Brand: MEDLINE

## (undated) DEVICE — CHLORAPREP 26ML ORANGE